# Patient Record
Sex: MALE | Race: WHITE | NOT HISPANIC OR LATINO | URBAN - METROPOLITAN AREA
[De-identification: names, ages, dates, MRNs, and addresses within clinical notes are randomized per-mention and may not be internally consistent; named-entity substitution may affect disease eponyms.]

---

## 2019-02-15 ENCOUNTER — EMERGENCY (EMERGENCY)
Facility: HOSPITAL | Age: 64
LOS: 1 days | Discharge: HOME | End: 2019-02-15
Attending: EMERGENCY MEDICINE
Payer: MEDICARE

## 2019-02-15 VITALS
OXYGEN SATURATION: 97 % | HEIGHT: 72 IN | DIASTOLIC BLOOD PRESSURE: 99 MMHG | RESPIRATION RATE: 18 BRPM | TEMPERATURE: 98 F | SYSTOLIC BLOOD PRESSURE: 156 MMHG | HEART RATE: 69 BPM | WEIGHT: 225.09 LBS

## 2019-02-15 PROCEDURE — 73030 X-RAY EXAM OF SHOULDER: CPT | Mod: 26,RT

## 2019-02-15 PROCEDURE — 99283 EMERGENCY DEPT VISIT LOW MDM: CPT

## 2019-02-15 PROCEDURE — 73030 X-RAY EXAM OF SHOULDER: CPT

## 2019-02-15 PROCEDURE — 99284 EMERGENCY DEPT VISIT MOD MDM: CPT

## 2019-02-15 RX ORDER — IBUPROFEN 200 MG
600 TABLET ORAL ONCE
Qty: 0 | Refills: 0 | Status: COMPLETED | OUTPATIENT
Start: 2019-02-15 | End: 2019-02-15

## 2019-02-15 RX ADMIN — Medication 600 MILLIGRAM(S): at 17:51

## 2019-02-15 NOTE — ED PROVIDER NOTE - PHYSICAL EXAMINATION
NAd, Hypertensive, Afebrile, No facial or scalp tender. No spinal tender. Lungs clear, ABD soft, non tender. + Right shoulder laterally tender with diminished ROMs. No obvious swelling. No spinal tenderness. Neuro- intact.

## 2019-02-15 NOTE — ED PROVIDER NOTE - CLINICAL SUMMARY MEDICAL DECISION MAKING FREE TEXT BOX
Pt with mechanical fall yesterday fell on right shoulder fell from 1 step no LOC no head injury no external bleeding Pain in right shoulder worse with active and passive movements No tingling or numbness over right deltoid area or distally No tenderness over right clavicale Normal exam of LUE and right elbow and wrist ambulatory xrays of right shoulder wnl likely contusion ice NSAIDs PMD follow up --Cosme

## 2019-02-15 NOTE — ED ADULT NURSE NOTE - NSIMPLEMENTINTERV_GEN_ALL_ED
Implemented All Fall Risk Interventions:  [place] to call system. Call bell, personal items and telephone within reach. Instruct patient to call for assistance. Room bathroom lighting operational. Non-slip footwear when patient is off stretcher. Physically safe environment: no spills, clutter or unnecessary equipment. Stretcher in lowest position, wheels locked, appropriate side rails in place. Provide visual cue, wrist band, yellow gown, etc. Monitor gait and stability. Monitor for mental status changes and reorient to person, place, and time. Review medications for side effects contributing to fall risk. Reinforce activity limits and safety measures with patient and family.

## 2019-02-15 NOTE — ED PROVIDER NOTE - OBJECTIVE STATEMENT
64yo male pt with PMHx of HTN, BPH, Pacemaker, ambulatory c/o right dominant shoulder pain s/p mechanical fall last night. Pt stated he tripped on a stair and fell on right shoulder. Pt also reported the pain's worsen with abduction movement. Denies head injury or other injuries. Denies LOC, headache or dizziness. Denies visual changes or N/V. Denies neck or back pain. Denies radiating pain, sensory changes or weakness to extremities. Denies CP/SOB/ABD pain or pelvic/ hip pain. Denies fever, chills or recent sickness.

## 2019-02-15 NOTE — ED ADULT NURSE NOTE - OBJECTIVE STATEMENT
63 y.o M A&Ox3 with PMH of HTN, BPH, valve repair (2005), thoracic aortic aneurysm surgery, presents to the ED c.o R shoulder pain s/p mechanical fall. Pt reports he missed one step at work and fell on R shoulder. Denies hitting head, and denies LOC. Pt. takes daily baby aspirin. Reports 9/10 pain during movement and 0 at rest. Pt. reports he was able to ambulate after fall without difficulty. Had pain when driving home and using RUE. Took Tylenol and applied ice last night. Partial relief last night. Had numbness/tingling last night which has since resolved. Pt. reports pain returned today and is unable to lift arm without difficulty due to pain. Strong peripheral pulses noted. Able to wiggle fingers without difficulty. Neuro intact. Denies fevers, chills, dizziness, CP, SOB, HA, and vision changes. Safety and comfort provided.

## 2019-02-15 NOTE — ED PROVIDER NOTE - ATTENDING CONTRIBUTION TO CARE
I have seen and evaluated this patient with the advance practice clinician.   I agree with the findings  unless other wise stated. I have amended notes where needed.  After my face to face bedside evaluation, I am noting: Pt with mechanical fall yesterday fell on right shoulder fell from 1 step no LOC no head injury no external bleeding Pain in right shoulder worse with active and passive movements No tingling or numbness over right deltoid area or distally No tenderness over right clavicale Normal exam of LUE and right elbow and wrist ambulatory xrays of right shoulder wnl likely contusion ice NSAIDs PMD follow up --Cosme

## 2019-10-15 ENCOUNTER — APPOINTMENT (OUTPATIENT)
Dept: SURGICAL ONCOLOGY | Facility: CLINIC | Age: 64
End: 2019-10-15
Payer: MEDICARE

## 2019-10-15 VITALS
HEART RATE: 66 BPM | BODY MASS INDEX: 30.48 KG/M2 | SYSTOLIC BLOOD PRESSURE: 118 MMHG | HEIGHT: 72 IN | WEIGHT: 225 LBS | DIASTOLIC BLOOD PRESSURE: 74 MMHG | OXYGEN SATURATION: 93 %

## 2019-10-15 DIAGNOSIS — Z86.79 PERSONAL HISTORY OF OTHER DISEASES OF THE CIRCULATORY SYSTEM: ICD-10-CM

## 2019-10-15 PROCEDURE — 99204 OFFICE O/P NEW MOD 45 MIN: CPT

## 2019-10-17 ENCOUNTER — OUTPATIENT (OUTPATIENT)
Dept: OUTPATIENT SERVICES | Facility: HOSPITAL | Age: 64
LOS: 1 days | End: 2019-10-17
Payer: MEDICARE

## 2019-10-17 ENCOUNTER — RESULT REVIEW (OUTPATIENT)
Age: 64
End: 2019-10-17

## 2019-10-17 DIAGNOSIS — C43.9 MALIGNANT MELANOMA OF SKIN, UNSPECIFIED: ICD-10-CM

## 2019-10-17 PROCEDURE — 88321 CONSLTJ&REPRT SLD PREP ELSWR: CPT

## 2019-10-21 PROBLEM — Z86.79 HISTORY OF CARDIAC MURMUR: Status: RESOLVED | Noted: 2019-10-15 | Resolved: 2019-10-21

## 2019-10-21 RX ORDER — CHROMIUM 200 MCG
TABLET ORAL
Refills: 0 | Status: ACTIVE | COMMUNITY

## 2019-10-21 RX ORDER — ASPIRIN 81 MG
81 TABLET, DELAYED RELEASE (ENTERIC COATED) ORAL
Refills: 0 | Status: ACTIVE | COMMUNITY

## 2019-10-21 RX ORDER — ASCORBIC ACID 500 MG
TABLET ORAL
Refills: 0 | Status: ACTIVE | COMMUNITY

## 2019-10-21 RX ORDER — TAMSULOSIN HYDROCHLORIDE 0.4 MG/1
0.4 CAPSULE ORAL
Refills: 0 | Status: ACTIVE | COMMUNITY

## 2019-10-21 RX ORDER — METOPROLOL SUCCINATE 50 MG/1
50 TABLET, EXTENDED RELEASE ORAL
Refills: 0 | Status: ACTIVE | COMMUNITY

## 2019-10-21 NOTE — ASSESSMENT
[FreeTextEntry1] : 65 y/o male with newly diagnosed left mid-back melanoma.\par Extent of disease is unclear based on supplied pathology report in Polish.\par \par Plan: Will submit slides for review.  Pending review, will determine whether additional excision and sentinel lymph node biopsy are indicated.  Patient to return in two week to discuss pathology.  All questions answered.

## 2019-10-21 NOTE — HISTORY OF PRESENT ILLNESS
[de-identified] : Mr. Martin is a 65 y/o male who was noted to have a suspicious appearing cutaneous lesion in the left mid-back while travelling in [place].  He underwent surgical excision in [place] on 09/26/2019 for which pathology demonstrated a melanoma.\par He is referred for possible additional treatment.\par This is his first cutaneous malignancy.\par He denies associated pain, bleeding or nodularity.\par He offers no other complaints.\par Per pathology report, melanoma reported to be Too's level 2, Breslow depth of 2 mm, pT1a which is inconsistent.

## 2019-10-21 NOTE — PHYSICAL EXAM
[FreeTextEntry1] : Back: Well healed left mid-back incision without residual pigmentation or surrounding nodularity.  No palpable regional adenopathy in axilla or cervical/supraclavicular neck. [Normal] : supple, no neck mass and thyroid not enlarged [Normal Supraclavicular Lymph Nodes] : normal supraclavicular lymph nodes [Normal Neck Lymph Nodes] : normal neck lymph nodes  [Normal Groin Lymph Nodes] : normal groin lymph nodes [Normal Axillary Lymph Nodes] : normal axillary lymph nodes [Normal] : grossly intact

## 2019-10-24 ENCOUNTER — OUTPATIENT (OUTPATIENT)
Dept: OUTPATIENT SERVICES | Facility: HOSPITAL | Age: 64
LOS: 1 days | End: 2019-10-24
Payer: MEDICARE

## 2019-10-24 ENCOUNTER — APPOINTMENT (OUTPATIENT)
Dept: SURGICAL ONCOLOGY | Facility: CLINIC | Age: 64
End: 2019-10-24
Payer: MEDICARE

## 2019-10-24 VITALS
TEMPERATURE: 97 F | OXYGEN SATURATION: 97 % | DIASTOLIC BLOOD PRESSURE: 78 MMHG | WEIGHT: 225.97 LBS | HEART RATE: 68 BPM | SYSTOLIC BLOOD PRESSURE: 108 MMHG | HEIGHT: 70.25 IN | RESPIRATION RATE: 14 BRPM

## 2019-10-24 VITALS
BODY MASS INDEX: 30.48 KG/M2 | HEIGHT: 72 IN | HEART RATE: 63 BPM | WEIGHT: 225 LBS | SYSTOLIC BLOOD PRESSURE: 143 MMHG | DIASTOLIC BLOOD PRESSURE: 78 MMHG | RESPIRATION RATE: 15 BRPM

## 2019-10-24 DIAGNOSIS — Z95.2 PRESENCE OF PROSTHETIC HEART VALVE: Chronic | ICD-10-CM

## 2019-10-24 DIAGNOSIS — C43.59 MALIGNANT MELANOMA OF OTHER PART OF TRUNK: ICD-10-CM

## 2019-10-24 DIAGNOSIS — Z95.0 PRESENCE OF CARDIAC PACEMAKER: Chronic | ICD-10-CM

## 2019-10-24 DIAGNOSIS — Z86.79 PERSONAL HISTORY OF OTHER DISEASES OF THE CIRCULATORY SYSTEM: Chronic | ICD-10-CM

## 2019-10-24 DIAGNOSIS — Z98.890 OTHER SPECIFIED POSTPROCEDURAL STATES: Chronic | ICD-10-CM

## 2019-10-24 LAB
ANION GAP SERPL CALC-SCNC: 13 MMO/L — SIGNIFICANT CHANGE UP (ref 7–14)
BUN SERPL-MCNC: 15 MG/DL — SIGNIFICANT CHANGE UP (ref 7–23)
CALCIUM SERPL-MCNC: 9.6 MG/DL — SIGNIFICANT CHANGE UP (ref 8.4–10.5)
CHLORIDE SERPL-SCNC: 103 MMOL/L — SIGNIFICANT CHANGE UP (ref 98–107)
CO2 SERPL-SCNC: 27 MMOL/L — SIGNIFICANT CHANGE UP (ref 22–31)
CREAT SERPL-MCNC: 1.01 MG/DL — SIGNIFICANT CHANGE UP (ref 0.5–1.3)
GLUCOSE SERPL-MCNC: 87 MG/DL — SIGNIFICANT CHANGE UP (ref 70–99)
HCT VFR BLD CALC: 43.9 % — SIGNIFICANT CHANGE UP (ref 39–50)
HGB BLD-MCNC: 14.8 G/DL — SIGNIFICANT CHANGE UP (ref 13–17)
MCHC RBC-ENTMCNC: 30.7 PG — SIGNIFICANT CHANGE UP (ref 27–34)
MCHC RBC-ENTMCNC: 33.7 % — SIGNIFICANT CHANGE UP (ref 32–36)
MCV RBC AUTO: 91.1 FL — SIGNIFICANT CHANGE UP (ref 80–100)
NRBC # FLD: 0 K/UL — SIGNIFICANT CHANGE UP (ref 0–0)
PLATELET # BLD AUTO: 197 K/UL — SIGNIFICANT CHANGE UP (ref 150–400)
PMV BLD: 9.4 FL — SIGNIFICANT CHANGE UP (ref 7–13)
POTASSIUM SERPL-MCNC: 4 MMOL/L — SIGNIFICANT CHANGE UP (ref 3.5–5.3)
POTASSIUM SERPL-SCNC: 4 MMOL/L — SIGNIFICANT CHANGE UP (ref 3.5–5.3)
RBC # BLD: 4.82 M/UL — SIGNIFICANT CHANGE UP (ref 4.2–5.8)
RBC # FLD: 13.3 % — SIGNIFICANT CHANGE UP (ref 10.3–14.5)
SODIUM SERPL-SCNC: 143 MMOL/L — SIGNIFICANT CHANGE UP (ref 135–145)
WBC # BLD: 6.63 K/UL — SIGNIFICANT CHANGE UP (ref 3.8–10.5)
WBC # FLD AUTO: 6.63 K/UL — SIGNIFICANT CHANGE UP (ref 3.8–10.5)

## 2019-10-24 PROCEDURE — 99214 OFFICE O/P EST MOD 30 MIN: CPT

## 2019-10-24 PROCEDURE — 93010 ELECTROCARDIOGRAM REPORT: CPT

## 2019-10-24 RX ORDER — SODIUM CHLORIDE 9 MG/ML
3 INJECTION INTRAMUSCULAR; INTRAVENOUS; SUBCUTANEOUS EVERY 8 HOURS
Refills: 0 | Status: DISCONTINUED | OUTPATIENT
Start: 2019-11-01 | End: 2019-11-16

## 2019-10-24 RX ORDER — VITAMIN E 100 UNIT
1 CAPSULE ORAL
Qty: 0 | Refills: 0 | DISCHARGE

## 2019-10-24 RX ORDER — SODIUM CHLORIDE 9 MG/ML
1000 INJECTION, SOLUTION INTRAVENOUS
Refills: 0 | Status: DISCONTINUED | OUTPATIENT
Start: 2019-11-01 | End: 2019-11-16

## 2019-10-24 NOTE — H&P PST ADULT - NEGATIVE GENERAL GENITOURINARY SYMPTOMS
no hematuria/no bladder infections/no renal colic no flank pain L/no flank pain R/no bladder infections/no hematuria/no renal colic

## 2019-10-24 NOTE — H&P PST ADULT - ASSESSMENT
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    Patient provided with famotidine for GI prophylaxis; verbalized understanding.    Patient provided with Chlorhexidine wash, verbal and written instructions reviewed. Patient demonstrated understanding with teach back.     STOP BANG score met, OR booking notified  OR booking notified of pacemaker and valve replacement     Comparison EKG, Echo and Stress test  requested    Cardiac evaluation requested by PST for abnormal EKG and aspirin plan, patient verbalized understanding, will make appointment  Case discussed with Dr Davis    Problem: Hypertension  Assessment and Plan: Patient instructed to take  on day of procedure, verbalized understanding.  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    Patient provided with famotidine for GI prophylaxis; verbalized understanding.    Patient provided with Chlorhexidine wash, verbal and written instructions reviewed. Patient demonstrated understanding with teach back.     STOP BANG score met, OR booking notified  OR booking notified of pacemaker and valve replacement     Comparison EKG, Echo and Stress test  requested    Cardiac evaluation requested by PST for  aspirin plan, patient verbalized understanding, will make appointment  Case discussed with Dr Canseco    Problem: Hypertension  Assessment and Plan: Patient instructed to take  on day of procedure, verbalized understanding.  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    Patient provided with famotidine for GI prophylaxis; verbalized understanding.    Patient provided with Chlorhexidine wash, verbal and written instructions reviewed. Patient demonstrated understanding with teach back.     STOP BANG score met, OR booking notified  OR booking notified of pacemaker and valve replacement     Comparison EKG, Echo and Stress test  requested    Cardiac evaluation requested by PST for aspirin plan, patient verbalized understanding, will make appointment  Case discussed with Dr Canseco    Problem: Hypertension  Assessment and Plan: Patient instructed to take metoprolol on day of procedure, verbalized understanding.  Patient instructed to stop multivitamin today , verbalized understanding

## 2019-10-24 NOTE — H&P PST ADULT - HISTORY OF PRESENT ILLNESS
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 Patient states he had melanoma removed in [place] 3 weeks prior and had reevaluation by surgeon. 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 Patient states he had melanoma removed in [place] 3 weeks prior and was referred to surgeon in [place] for further evaluation. 64 year old male presents with preop diagnosis of malignant melanoma of other part of trunk scheduled for excision left back melanoma axillary sentinel lymph node biopsy on 11/01/19. Patient states he had melanoma removed in [place] 3 weeks prior and was referred to surgeon in [place] for further evaluation.

## 2019-10-24 NOTE — H&P PST ADULT - NSICDXPASTMEDICALHX_GEN_ALL_CORE_FT
PAST MEDICAL HISTORY:  Cardiac murmur     History of BPH     Hypertension PAST MEDICAL HISTORY:  Cardiac murmur     History of BPH     Hypertension     Malignant melanoma left back

## 2019-10-24 NOTE — H&P PST ADULT - NEGATIVE CARDIOVASCULAR SYMPTOMS
no palpitations/no claudication/no dyspnea on exertion/no chest pain/no peripheral edema/no paroxysmal nocturnal dyspnea/no orthopnea

## 2019-10-24 NOTE — H&P PST ADULT - NEGATIVE SKIN SYMPTOMS
no change in size/color of mole/no tumor/no rash/no itching/no dryness no rash/no change in size/color of mole/no tumor/no itching

## 2019-10-24 NOTE — H&P PST ADULT - NSANTHTOTALSCORECAL_ENT_A_CORE
11/3/2017      RE: Brooks Summers  [address]  [facility] [address]       Dear Colleague,    Thank you for the opportunity to participate in the care of your patient, Brooks Summers, at the [facility]. Please see a copy of my visit note below.    HPI Mr. 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 He previously had severe left ventricular systolic dysfunction with previous EF 10-15% s/p ICD placement (1/2012) now with improved EF (50-55%). He subsequently improved his functional status and had been walking 3-4 miles a day, golfing 18 holes of golf without sx and carrying his golf clubs.     Today, the patient denies chest pain, orthopnea, PND, LE edema, early satiety, fatigue, racing heart beat, lightheadedness. No nausea or diaphoresis. No f/c/s.    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 Tx with Dapto/RIFx 8 weeks.     Gout flare      ICD (implantable cardiac defibrillator) in place      Keratoconus 1/29/14    RE>>LE     Paroxysmal a-fib (H)     Post-op 7/14/2011, 1/26/13     Rotator Cuff (Capsule) Sprain and Strain      S/P aortic valve replacement     7/14/2011, re-do 1/25/13 due to endocarditis     Smoking hx      Thrombocytopenia (H)        FAMILY HISTORY:  Family History   Problem Relation Age of Onset     C.A.D. Father 68     bypass, carotid      Prostate Cancer Father 70     CANCER Mother 92     stomach, colon     Hypertension Mother      Retinal detachment Mother       CANCER Brother 64     lung cancer, petroleum     Glaucoma No family hx of      Macular Degeneration No family hx of      Strabismus No family hx of      Glasses (<7 y/o) No family hx of        SOCIAL HISTORY:  History     Social History     Marital Status:      Spouse Name: N/A     Number of Children: N/A     Years of Education: N/A     Social History Main Topics     Smoking status: Former Smoker -- 1.00 packs/day for 20 years     Types: Cigarettes     Quit date: 12/31/1989     Smokeless tobacco: Never Used     Alcohol Use: Yes     Drug Use: No     Sexual Activity:     Partners: Female      Comment:      Other Topics Concern     None     Social History Narrative     since 2/1982 2 children 4 grandchildren.Carpet sales. Athlete in school, Golf, fish, yardwork       CURRENT MEDICATIONS:  Current Outpatient Prescriptions   Medication Sig Dispense Refill     Multiple Vitamins-Minerals (MULTIVITAMIN ADULTS 50+) TABS        VITAMIN D, CHOLECALCIFEROL, PO Take 1,000 Units by mouth daily       warfarin (COUMADIN) 5 MG tablet Take 7.5 mg on Mon, Fri; 10 mg all other days or as directed by the Anticoagulation Clinic 105 tablet 1     allopurinol (ZYLOPRIM) 100 MG tablet Take 1 tablet (100 mg) by mouth every evening 90 tablet 3     losartan (COZAAR) 25 MG tablet Take 0.5 tablets (12.5 mg) by mouth every evening 45 tablet 2     simvastatin (ZOCOR) 40 MG tablet Take 1 tablet (40 mg) by mouth At Bedtime 90 tablet 2     metoprolol (TOPROL XL) 50 MG 24 hr tablet Take 1 tablet (50 mg) by mouth daily 90 tablet 3     PROVIDER DOSED MANAGED WARFARIN, COUMADIN, OUTPATIENT Per coumadin clinic         ROS:   Constitutional: No fever, chills, or sweats. No weight gain/loss.   ENT: No visual disturbance, ear ache, epistaxis, sore throat.   Allergies/Immunologic: Negative.   Respiratory: No cough, hemoptysis.   Cardiovascular: As per HPI.   GI: No nausea, vomiting, hematemesis, melena, or hematochezia.   : No urinary  "frequency, dysuria, or hematuria.   Integument: Negative.   Psychiatric: Negative.   Neuro: Negative.   Endocrinology: Negative.   Musculoskeletal: Negative.    EXAM:  /70 (BP Location: Left arm, Cuff Size: Adult Regular)  Pulse 70  Ht 1.778 m (5' 10\")  Wt 83.9 kg (185 lb)  SpO2 95%  BMI 26.54 kg/m2  General: appears comfortable, alert and articulate  Head: normocephalic, atraumatic  Eyes: anicteric sclera, EOMI  Neck: no adenopathy  Orophyarynx: moist mucosa, no lesions, dentition intact  Heart: regular, S1/S2; 2-3/6 systolic ejection murmur at RUSB; no gallop, rub, estimated JVP 7  Lungs: clear, no rales or wheezing  Abdomen: soft, non-tender, bowel sounds present, no hepatosplenomegaly  Extremities: no clubbing, cyanosis or edema  Neurological: normal speech and affect, no gross motor deficits    Labs:  CBC RESULTS:  Lab Results   Component Value Date    WBC 6.4 11/28/2016    RBC 4.78 11/28/2016    HGB 13.6 04/03/2017    HCT 44.0 11/28/2016    MCV 92 11/28/2016    MCH 31.6 11/28/2016    MCHC 34.3 11/28/2016    RDW 13.2 11/28/2016    PLT 96 (L) 03/17/2017     CMP RESULTS:  Lab Results   Component Value Date     11/28/2016    POTASSIUM 4.0 04/03/2017    CHLORIDE 106 11/28/2016    CO2 27 11/28/2016    ANIONGAP 7 11/28/2016    GLC 92 11/28/2016    BUN 17 11/28/2016    CR 0.88 04/03/2017    GFRESTIMATED 85 04/03/2017    GFRESTBLACK >90   GFR Calc   04/03/2017    MARTIN 9.2 11/28/2016    BILITOTAL 1.0 11/28/2016    ALBUMIN 4.2 11/28/2016    ALKPHOS 68 11/28/2016    ALT 45 11/28/2016    AST 34 11/28/2016      INR RESULTS:  Lab Results   Component Value Date    INR 2.0 (A) 10/11/2017    INR 1.17 (H) 04/03/2017     Lab Results   Component Value Date    MAG 2.1 08/20/2014     No results found for: NTBNPI  Lab Results   Component Value Date    NTBNP 552 (H) 12/17/2012         Echocardiogram 10/12/2015  Interpretation Summary  AVR with 23 mm Perimount Aortic valve 2011. Leaflet mobility is " normal and there is no calcification seen. There is mild central AI and normal transvalvar velocities (peak velocity 2.4 m/s, DVI .4, EOA 2.5 cm2)  LV size and function are normal with an LVEF at lower limits of normal (53%). There is no LVH.  Mean Aortic valve gradient 13 mm Hg.    Device interrogation 10/12/2015  Patient seen in clinic for evaluation and iterative programming of his multi lead ICD and appointment with Dr. Castro. Normal ICD function. No ventricular arrhythmias recorded. 3 AT/AF episodes recorded - total AT/AF time = 15 seconds, no stored EGM's for review. Intrinsic rhythm = NSR with CHB. BVP = 99.5%. OptiVol fluid index is near baseline. Battery voltage = 2.64 V. SIMONE = 2.63 V.   Multi lead ICD    12/03/2015 - CRT-D generator change   FINAL PROGRAMMING  Francis Settings:  -Pacing mode: DDDR.  -Lower Rate Limit: 60 ppm.  -Upper Rate Limit: 140 ppm.  -LV Lead pacing configuration: LV tip-RV coil.  Tachy Settings:  -VT Zone: set at 188 bpm, Therapy: 35 J x 6.  -VF zone: set at 143 bpm, Therapy: monitor only.    Device interrogation 10/07/2016  Normal device parameters. Normal ICD function. No ventricular arrhythmias recorded. OptiVol fluid index is at baseline.    ASSESSMENT   In summary, Mr. Summers is a 69-year-old male, s/p re-do median sternotomy with an aortic valve replacement with a 21-mm Johnson PERIMOUNT Magna tissue valve plus mitral valve repair with pericardial patch repair of a large anterior mitral valve leaflet perforation and debridement of the infected aortic root with subannular infection due to endocarditis.  Also has history of complete heart block, low EF 10-15%, now recovered to 50-55% in the setting of biventricular-ICD.    Problem List  -Bioprosthetic AVR redo secondary to endocarditis (7/14/2011,1/25/13); echo 12/15/2015 notable for Ao mean gradient 13 mm Hg  -Complete heart block s/p CRT-D (s/p generator change 12/3/2015)  -Cardiomyopathy recovered EF 50-55% s/p  CRT-D  -Paroxysmal AF on anticoagulation  -Hyperlipidemia  -Thrombocytopenia (seen by hematology; negative workup)    Plan  -will continue current heart failure regimen (losartan 12.5, and metoprolol 50 xl), along with cardiac resynchronization therapy  -absolutely requires endocarditis prophylaxis for procedures (amoxicillin 2 g PO 1 hour prior to procedures)  -continue warfarin for [id number] of 2, very low burden on device interrogation  -RTC in 1 year with echocardiogram at that time    Teto Merino MD  Cardiovascular Disease Fellow  Pager: [phone]    Attending Attestation:  Patient seen and examined by me with the Fellow. I have performed all pertinent elements of the physical examination and reviewed the note above. I have reviewed pertinent laboratory, echocardiographic, imaging, and cardiac catheterization results. I agree with the plan of care as described in this note.    Yemi Castro MD, PhD       5

## 2019-10-24 NOTE — H&P PST ADULT - CARDIOVASCULAR DETAILS
positive S2/positive S1/murmur irregular rate and rhythm/positive S1/positive S2 positive S1/positive S2

## 2019-10-24 NOTE — H&P PST ADULT - NEGATIVE ENMT SYMPTOMS
no nasal obstruction/no hearing difficulty/no tinnitus/no sinus symptoms/no nasal discharge/no dry mouth/no throat pain/no vertigo/no ear pain/no dysphagia

## 2019-10-24 NOTE — H&P PST ADULT - CARDIOVASCULAR COMMENTS
PMH pacemaker insertion, cardiac murmur s/p valve replacement Abnormal EKG- pt denies CP, SOB, palpitations- scheduled for cardiac evaluation PMH pacemaker, cardiac murmur s/p valve replacement

## 2019-10-24 NOTE — H&P PST ADULT - NSICDXPASTSURGICALHX_GEN_ALL_CORE_FT
PAST SURGICAL HISTORY:  Cardiac pacemaker 05/2013    History of heart valve replacement 2005, 2013, porcine PAST SURGICAL HISTORY:  Cardiac pacemaker 05/2013    History of heart valve replacement 2005, 2013, porcine    S/P abdominal aortic aneurysm repair 2013

## 2019-10-24 NOTE — H&P PST ADULT - RS GEN PE MLT RESP DETAILS PC
clear to auscultation bilaterally/good air movement/breath sounds equal/no rales/respirations non-labored/airway patent/no wheezes/no rhonchi

## 2019-10-25 NOTE — ASSESSMENT
[FreeTextEntry1] : T2a melanoma of left mid/outer back - plan for wide excision and sentinel lymph node biopsy.\par T1a melanoma of right upper back - plan wide excision without indication for sentinel lymph node biopsy.\par Plastic surgery evaluation for closure of defect.\par Risks/benefits explained to patient.  All questions answered.  He expresses understanding and wishes to proceed.

## 2019-10-25 NOTE — PHYSICAL EXAM
[FreeTextEntry1] : Back: Well healed left mid-back incision without residual pigmentation or surrounding nodularity.  No palpable regional adenopathy in axilla or cervical/supraclavicular neck.  Biopsy site of right upper back melanoma. [Normal] : supple, no neck mass and thyroid not enlarged [Normal Supraclavicular Lymph Nodes] : normal supraclavicular lymph nodes [Normal Neck Lymph Nodes] : normal neck lymph nodes  [Normal Groin Lymph Nodes] : normal groin lymph nodes [Normal Axillary Lymph Nodes] : normal axillary lymph nodes [Normal] : oriented to person, place and time, with appropriate affect

## 2019-10-25 NOTE — HISTORY OF PRESENT ILLNESS
[de-identified] : Mr. Martin is a 63 y/o male who was noted to have a suspicious appearing cutaneous lesion in the left mid-back while travelling in [place].  He underwent surgical excision in [place] on 09/26/2019 for which pathology demonstrated a melanoma.\par He is referred for possible additional treatment.\par This is his first cutaneous malignancy.\par He denies associated pain, bleeding or nodularity.\par He offers no other complaints.\par Per pathology report, melanoma reported to be Too's level 2, Breslow depth of 2 mm, pT1a which is inconsistent.\par \par 10/24/2019: Pathologic review of slides from left mid/outer back melanoma excision here at [facility] demonstrates a 1.2 mm thick melanoma with regression.  Since his initial consultation, patient has another biopsy a right upper back lesion which returned as a 0.5 mm thick melanoma.  He feels well and offers no complaints.  Surgery scheduled for 11/01/2019.

## 2019-10-28 ENCOUNTER — APPOINTMENT (OUTPATIENT)
Dept: PLASTIC SURGERY | Facility: CLINIC | Age: 64
End: 2019-10-28
Payer: MEDICARE

## 2019-10-28 VITALS — BODY MASS INDEX: 30.48 KG/M2 | WEIGHT: 225 LBS | HEIGHT: 72 IN

## 2019-10-28 DIAGNOSIS — Z78.9 OTHER SPECIFIED HEALTH STATUS: ICD-10-CM

## 2019-10-28 PROBLEM — C43.9 MALIGNANT MELANOMA OF SKIN, UNSPECIFIED: Chronic | Status: ACTIVE | Noted: 2019-10-24

## 2019-10-28 PROBLEM — Z87.438 PERSONAL HISTORY OF OTHER DISEASES OF MALE GENITAL ORGANS: Chronic | Status: ACTIVE | Noted: 2019-10-24

## 2019-10-28 PROBLEM — R01.1 CARDIAC MURMUR, UNSPECIFIED: Chronic | Status: ACTIVE | Noted: 2019-10-24

## 2019-10-28 PROBLEM — I10 ESSENTIAL (PRIMARY) HYPERTENSION: Chronic | Status: ACTIVE | Noted: 2019-10-24

## 2019-10-28 PROCEDURE — 99204 OFFICE O/P NEW MOD 45 MIN: CPT

## 2019-10-29 PROBLEM — Z78.9 MODERATE EXERCISE: Status: ACTIVE | Noted: 2019-10-28

## 2019-10-29 PROBLEM — Z78.9 DOES NOT USE ILLICIT DRUGS: Status: ACTIVE | Noted: 2019-10-28

## 2019-10-29 PROBLEM — Z78.9 SOCIAL ALCOHOL USE: Status: ACTIVE | Noted: 2019-10-28

## 2019-10-29 NOTE — HISTORY OF PRESENT ILLNESS
[FreeTextEntry1] : Patient with two skin lesions of the back which were biopsied in [place]. Review of the slides here revealed one lesion as 1.2 mm superficial spreading MM and the other and inflamed lentigo. Patient is scheduled for wide and deep excision by Dr. Beckett and a sentinel lymph node biopsy. The patient is here for consultation for reconstruction after excision.

## 2019-10-29 NOTE — REASON FOR VISIT
[Consultation] : a consultation visit [FreeTextEntry1] : Pt presents here for an initial consultation at the request of Dr. Connor Beckett regarding surgery scheduled on 11/01/2019 at [facility]. Pt states he has a lesion present on his left mid/outer back and right upper back. Pt states he had a biopsy performed on 09/26/2019 in [place], results indicate melanoma. Pt states he had two biopsies performed at [facility] on 10/24/2019, left mid/outer back lesion demonstrates a 1.2mm thick melanoma with regression and right upper back lesion demonstrates a 0.5mm thick melanoma. Pt states Dr. Beckett would like assistance of MD Plastic Surgeon.

## 2019-10-31 ENCOUNTER — OUTPATIENT (OUTPATIENT)
Dept: OUTPATIENT SERVICES | Facility: HOSPITAL | Age: 64
LOS: 1 days | End: 2019-10-31
Payer: COMMERCIAL

## 2019-10-31 ENCOUNTER — FORM ENCOUNTER (OUTPATIENT)
Age: 64
End: 2019-10-31

## 2019-10-31 ENCOUNTER — RESULT REVIEW (OUTPATIENT)
Age: 64
End: 2019-10-31

## 2019-10-31 DIAGNOSIS — Z95.2 PRESENCE OF PROSTHETIC HEART VALVE: Chronic | ICD-10-CM

## 2019-10-31 DIAGNOSIS — C43.59 MALIGNANT MELANOMA OF OTHER PART OF TRUNK: ICD-10-CM

## 2019-10-31 DIAGNOSIS — Z95.0 PRESENCE OF CARDIAC PACEMAKER: Chronic | ICD-10-CM

## 2019-10-31 DIAGNOSIS — Z98.890 OTHER SPECIFIED POSTPROCEDURAL STATES: Chronic | ICD-10-CM

## 2019-10-31 PROCEDURE — 88321 CONSLTJ&REPRT SLD PREP ELSWR: CPT

## 2019-10-31 NOTE — ASU PATIENT PROFILE, ADULT - PSH
Cardiac pacemaker  05/2013  History of heart valve replacement  2005, 2013, porcine  S/P abdominal aortic aneurysm repair  2013

## 2019-11-01 ENCOUNTER — APPOINTMENT (OUTPATIENT)
Dept: NUCLEAR MEDICINE | Facility: HOSPITAL | Age: 64
End: 2019-11-01

## 2019-11-01 ENCOUNTER — APPOINTMENT (OUTPATIENT)
Dept: SURGICAL ONCOLOGY | Facility: HOSPITAL | Age: 64
End: 2019-11-01

## 2019-11-01 ENCOUNTER — OUTPATIENT (OUTPATIENT)
Dept: OUTPATIENT SERVICES | Facility: HOSPITAL | Age: 64
LOS: 1 days | Discharge: HOME | End: 2019-11-01
Payer: MEDICARE

## 2019-11-01 ENCOUNTER — RESULT REVIEW (OUTPATIENT)
Age: 64
End: 2019-11-01

## 2019-11-01 VITALS
SYSTOLIC BLOOD PRESSURE: 104 MMHG | RESPIRATION RATE: 16 BRPM | DIASTOLIC BLOOD PRESSURE: 58 MMHG | TEMPERATURE: 97 F | HEIGHT: 72 IN | WEIGHT: 225.09 LBS | OXYGEN SATURATION: 94 % | HEART RATE: 61 BPM

## 2019-11-01 VITALS
RESPIRATION RATE: 18 BRPM | TEMPERATURE: 98 F | SYSTOLIC BLOOD PRESSURE: 137 MMHG | DIASTOLIC BLOOD PRESSURE: 62 MMHG | OXYGEN SATURATION: 95 % | HEART RATE: 63 BPM

## 2019-11-01 DIAGNOSIS — Z95.2 PRESENCE OF PROSTHETIC HEART VALVE: Chronic | ICD-10-CM

## 2019-11-01 DIAGNOSIS — Z95.0 PRESENCE OF CARDIAC PACEMAKER: Chronic | ICD-10-CM

## 2019-11-01 DIAGNOSIS — C43.59 MALIGNANT MELANOMA OF OTHER PART OF TRUNK: ICD-10-CM

## 2019-11-01 DIAGNOSIS — Z98.890 OTHER SPECIFIED POSTPROCEDURAL STATES: Chronic | ICD-10-CM

## 2019-11-01 PROCEDURE — 12032 INTMD RPR S/A/T/EXT 2.6-7.5: CPT | Mod: XS

## 2019-11-01 PROCEDURE — 38525 BIOPSY/REMOVAL LYMPH NODES: CPT

## 2019-11-01 PROCEDURE — 11606 EXC TR-EXT MAL+MARG >4 CM: CPT

## 2019-11-01 PROCEDURE — 88305 TISSUE EXAM BY PATHOLOGIST: CPT | Mod: 26

## 2019-11-01 PROCEDURE — 88307 TISSUE EXAM BY PATHOLOGIST: CPT | Mod: 26

## 2019-11-01 PROCEDURE — 14021 TIS TRNFR S/A/L 10.1-30 SQCM: CPT | Mod: 59

## 2019-11-01 PROCEDURE — 14301 TIS TRNFR ANY 30.1-60 SQ CM: CPT

## 2019-11-01 PROCEDURE — 88342 IMHCHEM/IMCYTCHM 1ST ANTB: CPT | Mod: 26

## 2019-11-01 PROCEDURE — 78195 LYMPH SYSTEM IMAGING: CPT | Mod: 26

## 2019-11-01 PROCEDURE — 88341 IMHCHEM/IMCYTCHM EA ADD ANTB: CPT | Mod: 26

## 2019-11-01 PROCEDURE — 11604 EXC TR-EXT MAL+MARG 3.1-4 CM: CPT

## 2019-11-01 RX ORDER — ACETAMINOPHEN 500 MG
2 TABLET ORAL
Qty: 32 | Refills: 0
Start: 2019-11-01 | End: 2019-11-04

## 2019-11-01 RX ORDER — TAMSULOSIN HYDROCHLORIDE 0.4 MG/1
1 CAPSULE ORAL
Qty: 0 | Refills: 0 | DISCHARGE

## 2019-11-01 RX ORDER — OXYCODONE HYDROCHLORIDE 5 MG/1
1 TABLET ORAL
Qty: 12 | Refills: 0
Start: 2019-11-01 | End: 2019-11-03

## 2019-11-01 RX ORDER — SODIUM CHLORIDE 9 MG/ML
500 INJECTION, SOLUTION INTRAVENOUS
Refills: 0 | Status: DISCONTINUED | OUTPATIENT
Start: 2019-11-01 | End: 2019-11-16

## 2019-11-01 RX ORDER — ASPIRIN/CALCIUM CARB/MAGNESIUM 324 MG
1 TABLET ORAL
Qty: 0 | Refills: 0 | DISCHARGE

## 2019-11-01 RX ORDER — METOPROLOL TARTRATE 50 MG
1 TABLET ORAL
Qty: 0 | Refills: 0 | DISCHARGE

## 2019-11-01 RX ADMIN — SODIUM CHLORIDE 30 MILLILITER(S): 9 INJECTION, SOLUTION INTRAVENOUS at 12:32

## 2019-11-01 NOTE — ASU DISCHARGE PLAN (ADULT/PEDIATRIC) - PROVIDER TOKENS
PROVIDER:[TOKEN:[[id number]],FOLLOWUP:[2 weeks]],PROVIDER:[TOKEN:[[id number]],FOLLOWUP:[1 week]]

## 2019-11-01 NOTE — ASU DISCHARGE PLAN (ADULT/PEDIATRIC) - POST OP PHONE #
[phone] pt. granted permission to leave message /and or speak with whoever answers the phone.

## 2019-11-01 NOTE — ASU PREOP CHECKLIST - STERILIZATION AFFIRMATION
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 without significant side  ffects except some hiccoughs  in first 4 weeks of treatment and mid range dose of about 10 mg/kg/day of ethosuximide  Ready to increase ESX to 200 mg bid for one week then 250 mg bid  Mom will call     Fatigue is noted at school and may be alleviated by decrease in her alpha blockers being used for ADHD  Discussed with mom and suggested she discuss this with psychiatry  Subjective:    HPI     Doing ok on meds with definite decrease in staring spells but still see them every day   Had been several times an hour   School has not noticed them in the first weeks this year    She is crying and sleepy at school though    Her adhd meds are tenex, clonidine and vyvanse      The following portions of the patient's history were reviewed and updated as appropriate: allergies, current medications, past family history, past medical history, past social history, past surgical history and problem list          Objective:  BP (!) 103/56 (BP Location: Right arm, Patient Position: Sitting, Cuff Size: Standard)   Pulse 90   Resp 20   Ht 3' 6 4" (1 077 m)   Wt 18 6 kg (41 lb)   BMI 16 03 kg/m²     There were no vitals taken for this visit  Physical Exam   Skin clear  Face and mucous membranes normal  Neck supple - thyromegaly  Nl heart rate  Normal resp effort  Abd soft - no HSM  Extremities nrmal without petechiae   NEURO:  Normal, flat optic discs  EOM, face, tongue, and palate movement normal  Normal finger to nose, no tremor or dysmetria  Normal unipedal stand, hop, tandem, toe and heel standing  Normal posture sitting, sit to stand and standing  Normal functional strength    DTRs normal   Psychiatric: normal engagement; minor excess movement followed direction      I have reviewed the ROS as written below       ROS:    Review of Systems Constitutional: Negative for appetite change, fatigue and unexpected weight change  HENT: Negative for congestion, tinnitus, trouble swallowing and voice change  Eyes: Negative for visual disturbance  Respiratory: Negative for shortness of breath  Cardiovascular: Negative for palpitations  Gastrointestinal: Negative for abdominal pain, diarrhea, nausea and vomiting  Genitourinary: Negative for frequency and urgency  Musculoskeletal: Negative for arthralgias, back pain, myalgias and neck pain  Skin: Negative for rash  Neurological: Negative for dizziness, tremors, seizures, syncope, facial asymmetry, speech difficulty, weakness, light-headedness, numbness and headaches  Hematological: Bruises/bleeds easily  Psychiatric/Behavioral: Positive for sleep disturbance  n/a

## 2019-11-01 NOTE — ASU DISCHARGE PLAN (ADULT/PEDIATRIC) - CARE PROVIDER_API CALL
Connor Beckett)  Surgery  [address] [place] [address]  Phone: [phone]  Fax: [phone]  Follow Up Time: 2 weeks    Waldo Wilkins)  Plastic Surgery  [address] [place] [address], [address]  [place] [address]  Phone: [phone]  Fax: [phone]  Follow Up Time: 1 week

## 2019-11-01 NOTE — ASU DISCHARGE PLAN (ADULT/PEDIATRIC) - ASU DC SPECIAL INSTRUCTIONSFT
Keep dressings on.  You may shower in 24 hours.  Do not rub or scrub over dressings.  Pat dry gently.

## 2019-11-01 NOTE — BRIEF OPERATIVE NOTE - NSICDXBRIEFPROCEDURE_GEN_ALL_CORE_FT
PROCEDURES:  Adjacent tissue transfer of trunk, 10.1 to 30 sq cm 01-Nov-2019 19:13:49  River Turner
PROCEDURES:  Wide excision, melanoma, torso, with sentinel lymph node biopsy 01-Nov-2019 18:21:03 Left and right back Robbie Bates

## 2019-11-01 NOTE — BRIEF OPERATIVE NOTE - OPERATION/FINDINGS
closure L axilla, adjacent tissue transfer closure of L back and R upper back melanoma defects
Left axillary sentinel lymph node biopsy, Right upper back and Left lower back wide excision of melanoma

## 2019-11-01 NOTE — ASU DISCHARGE PLAN (ADULT/PEDIATRIC) - CALL YOUR DOCTOR IF YOU HAVE ANY OF THE FOLLOWING:
Swelling that gets worse/Wound/Surgical Site with redness, or foul smelling discharge or pus/Bleeding that does not stop/Pain not relieved by Medications Numbness, tingling, color or temperature change to extremity/Nausea and vomiting that does not stop/Unable to urinate/Bleeding that does not stop/Swelling that gets worse/Wound/Surgical Site with redness, or foul smelling discharge or pus/Pain not relieved by Medications

## 2019-11-05 ENCOUNTER — APPOINTMENT (OUTPATIENT)
Dept: PLASTIC SURGERY | Facility: CLINIC | Age: 64
End: 2019-11-05
Payer: MEDICARE

## 2019-11-05 PROCEDURE — 99024 POSTOP FOLLOW-UP VISIT: CPT

## 2019-11-05 NOTE — HISTORY OF PRESENT ILLNESS
[FreeTextEntry1] : Pt s/p excision of melanoma from left mid outer back and right upper back and left axillary sentinel lymph node biopsy.  Pt doing well no complaints

## 2019-11-05 NOTE — REVIEW OF SYSTEMS
[Fever] : no fever [Chills] : no chills [Negative] : Respiratory [de-identified] : Back dressings in place

## 2019-11-05 NOTE — PHYSICAL EXAM
[NI] : Normal [de-identified] : Back incisions healing well no sign of infection no erythema no dehiscence\par Left axillary incision healing well no sign of infection

## 2019-12-02 ENCOUNTER — APPOINTMENT (OUTPATIENT)
Dept: PLASTIC SURGERY | Facility: CLINIC | Age: 64
End: 2019-12-02
Payer: MEDICARE

## 2019-12-02 PROCEDURE — 99024 POSTOP FOLLOW-UP VISIT: CPT

## 2019-12-02 NOTE — REASON FOR VISIT
[Post Op: _________] : a [unfilled] post op visit [FreeTextEntry1] : DOS: 11/01/2019 s/o closure of the back following excision of melanoma from left mid outer back and left axillary sentinel node biopsy. Pt states he is doing well, denies any complaints.

## 2019-12-02 NOTE — PHYSICAL EXAM
[NI] : Normal [de-identified] : Back incisions healed well\par Left axillary incision healed well

## 2019-12-03 ENCOUNTER — APPOINTMENT (OUTPATIENT)
Dept: SURGICAL ONCOLOGY | Facility: CLINIC | Age: 64
End: 2019-12-03
Payer: MEDICARE

## 2019-12-03 VITALS
DIASTOLIC BLOOD PRESSURE: 85 MMHG | OXYGEN SATURATION: 94 % | WEIGHT: 225 LBS | HEART RATE: 60 BPM | SYSTOLIC BLOOD PRESSURE: 137 MMHG | HEIGHT: 72 IN | BODY MASS INDEX: 30.48 KG/M2

## 2019-12-03 PROCEDURE — 99024 POSTOP FOLLOW-UP VISIT: CPT

## 2019-12-05 LAB — SURGICAL PATHOLOGY STUDY: SIGNIFICANT CHANGE UP

## 2019-12-05 NOTE — HISTORY OF PRESENT ILLNESS
[de-identified] : Mr. Martin is a 65 y/o male who was noted to have a suspicious appearing cutaneous lesion in the left mid-back while travelling in [place].  He underwent surgical excision in [place] on 09/26/2019 for which pathology demonstrated a melanoma.\par He is referred for possible additional treatment.\par This is his first cutaneous malignancy.\par He denies associated pain, bleeding or nodularity.\par He offers no other complaints.\par Per pathology report, melanoma reported to be Too's level 2, Breslow depth of 2 mm, pT1a which is inconsistent.\par \par 10/24/2019: Pathologic review of slides from left mid/outer back melanoma excision here at [facility] demonstrates a 1.2 mm thick melanoma with regression.  Since his initial consultation, patient has another biopsy a right upper back lesion which returned as a 0.5 mm thick melanoma.  He feels well and offers no complaints.  Surgery scheduled for 11/01/2019.\par \par 12/03/2019: Patient is s/p wide excision of left mid back melanoma with left axillary sentinel lymph node biopsy and excision of right upper back melanoma 11/01/2019.  Pathology is pending at this time.  He feels well and has no complaints.

## 2019-12-05 NOTE — PHYSICAL EXAM
[FreeTextEntry1] : Well healed left mid back, left axillary and right upper back incisions.  No seromas.

## 2019-12-05 NOTE — ASSESSMENT
[FreeTextEntry1] : Will inform patient of pathology results when available.\par Continue dermatology surveillance.\par Follow up in 4 months.

## 2019-12-16 ENCOUNTER — FORM ENCOUNTER (OUTPATIENT)
Age: 64
End: 2019-12-16

## 2019-12-16 ENCOUNTER — APPOINTMENT (OUTPATIENT)
Dept: SURGERY | Facility: CLINIC | Age: 64
End: 2019-12-16
Payer: MEDICARE

## 2019-12-16 VITALS
BODY MASS INDEX: 32.1 KG/M2 | DIASTOLIC BLOOD PRESSURE: 64 MMHG | HEIGHT: 72 IN | OXYGEN SATURATION: 94 % | HEART RATE: 64 BPM | TEMPERATURE: 98 F | SYSTOLIC BLOOD PRESSURE: 98 MMHG | WEIGHT: 237 LBS

## 2019-12-16 PROCEDURE — 99202 OFFICE O/P NEW SF 15 MIN: CPT

## 2019-12-16 PROCEDURE — 99212 OFFICE O/P EST SF 10 MIN: CPT

## 2019-12-16 NOTE — ASSESSMENT
[FreeTextEntry1] : 65yo M h/o AAA s/p repair, bioprosthetic aortic valve, presenting with midline ventral hernia for elective repair.\par -CT scan of the abdomen to delineate the defect further. The repair options will be discussed once the CT scan is obtained.

## 2019-12-16 NOTE — HISTORY OF PRESENT ILLNESS
[de-identified] : 65yo M with h/o AAA s/p open repair with bioprosthetic aortic valve replacement (2013) presenting for evaluation of ventral hernia. First noticed the hernia 6 years ago, a few months after operation as a bulge in epigastric region - no associated pain, redness, or tenderness. He has not had any abdominal pain or changes in bowel movements. Pt does not take any AC or antiplatelet agents. He would like to discuss options for repair.

## 2019-12-16 NOTE — PHYSICAL EXAM
[Abdominal Masses] : Abdominal mass present [Normal Heart Sounds] : normal heart sounds [Normal Breath Sounds] : Normal breath sounds [Normal Rate and Rhythm] : normal rate and rhythm [No HSM] : no hepatosplenomegaly [Tender] : was nontender [Enlarged] : not enlarged [No Rash or Lesion] : No rash or lesion [Alert] : alert [Oriented to Person] : oriented to person [Oriented to Place] : oriented to place [Oriented to Time] : oriented to time [Calm] : calm [de-identified] : NAD, sitting comfortably in chair [de-identified] : EOMI, moist mucous membranes [de-identified] : No visible masses, no cervical LAD [de-identified] : Bulge noted in epigastrium in midline, no erythema, tenderness, or induration. Bulge increases in size with valsalva. Manually reducible

## 2019-12-17 ENCOUNTER — APPOINTMENT (OUTPATIENT)
Dept: CT IMAGING | Facility: CLINIC | Age: 64
End: 2019-12-17
Payer: MEDICARE

## 2019-12-17 ENCOUNTER — OUTPATIENT (OUTPATIENT)
Dept: OUTPATIENT SERVICES | Facility: HOSPITAL | Age: 64
LOS: 1 days | End: 2019-12-17
Payer: MEDICARE

## 2019-12-17 DIAGNOSIS — Z00.8 ENCOUNTER FOR OTHER GENERAL EXAMINATION: ICD-10-CM

## 2019-12-17 DIAGNOSIS — Z98.890 OTHER SPECIFIED POSTPROCEDURAL STATES: Chronic | ICD-10-CM

## 2019-12-17 DIAGNOSIS — Z95.2 PRESENCE OF PROSTHETIC HEART VALVE: Chronic | ICD-10-CM

## 2019-12-17 DIAGNOSIS — Z95.0 PRESENCE OF CARDIAC PACEMAKER: Chronic | ICD-10-CM

## 2019-12-17 PROCEDURE — 74150 CT ABDOMEN W/O CONTRAST: CPT | Mod: 26

## 2019-12-17 PROCEDURE — 74150 CT ABDOMEN W/O CONTRAST: CPT

## 2020-02-03 ENCOUNTER — APPOINTMENT (OUTPATIENT)
Dept: SURGERY | Facility: CLINIC | Age: 65
End: 2020-02-03
Payer: MEDICARE

## 2020-02-03 VITALS
HEART RATE: 67 BPM | BODY MASS INDEX: 32.1 KG/M2 | WEIGHT: 237 LBS | DIASTOLIC BLOOD PRESSURE: 70 MMHG | TEMPERATURE: 97.6 F | SYSTOLIC BLOOD PRESSURE: 115 MMHG | HEIGHT: 72 IN | OXYGEN SATURATION: 94 %

## 2020-02-03 DIAGNOSIS — K43.9 VENTRAL HERNIA W/OUT OBSTRUCTION OR GANGRENE: ICD-10-CM

## 2020-02-03 PROCEDURE — 99212 OFFICE O/P EST SF 10 MIN: CPT

## 2020-02-03 NOTE — HISTORY OF PRESENT ILLNESS
[de-identified] : 63 yo male with ventral hernia who presents to the office for evaluation of CT scan. He has large epigastric hernia. We will proceed with robotic assisted ventral hernia with mesh. All risk, benefit, alternatives explained and the patient expressed full understanding.

## 2020-04-07 ENCOUNTER — APPOINTMENT (OUTPATIENT)
Dept: SURGICAL ONCOLOGY | Facility: CLINIC | Age: 65
End: 2020-04-07

## 2020-04-24 ENCOUNTER — APPOINTMENT (OUTPATIENT)
Dept: SURGERY | Facility: HOSPITAL | Age: 65
End: 2020-04-24

## 2020-08-11 ENCOUNTER — APPOINTMENT (OUTPATIENT)
Dept: SURGICAL ONCOLOGY | Facility: CLINIC | Age: 65
End: 2020-08-11
Payer: MEDICARE

## 2020-08-11 DIAGNOSIS — C43.59 MALIGNANT MELANOMA OF OTHER PART OF TRUNK: ICD-10-CM

## 2020-08-11 PROCEDURE — 99213 OFFICE O/P EST LOW 20 MIN: CPT

## 2020-08-12 PROBLEM — C43.59 MALIGNANT MELANOMA OF BACK: Status: ACTIVE | Noted: 2019-10-21

## 2020-08-12 NOTE — ASSESSMENT
[FreeTextEntry1] : No clinical evidence of melanoma recurrence.\par \par Plan: Follow up exam in 6 months.  Continue with dermatology follow up.

## 2020-08-12 NOTE — PHYSICAL EXAM
[FreeTextEntry1] : Well healed left mid back, left axillary and right upper back incisions.  No seromas. No evidence of locoregional recurrence or adenopathy. [Normal] : supple, no neck mass and thyroid not enlarged [Normal Supraclavicular Lymph Nodes] : normal supraclavicular lymph nodes [Normal Neck Lymph Nodes] : normal neck lymph nodes  [Normal Groin Lymph Nodes] : normal groin lymph nodes [Normal Axillary Lymph Nodes] : normal axillary lymph nodes [Normal] : grossly intact

## 2020-08-12 NOTE — HISTORY OF PRESENT ILLNESS
[de-identified] : Mr. Martin is a 65 y/o male who was noted to have a suspicious appearing cutaneous lesion in the left mid-back while travelling in [place].  He underwent surgical excision in [place] on 09/26/2019 for which pathology demonstrated a melanoma.\par He is referred for possible additional treatment.\par This is his first cutaneous malignancy.\par He denies associated pain, bleeding or nodularity.\par He offers no other complaints.\par Per pathology report, melanoma reported to be Too's level 2, Breslow depth of 2 mm, pT1a which is inconsistent.\par \par 10/24/2019: Pathologic review of slides from left mid/outer back melanoma excision here at [facility] demonstrates a 1.2 mm thick melanoma with regression.  Since his initial consultation, patient has another biopsy a right upper back lesion which returned as a 0.5 mm thick melanoma.  He feels well and offers no complaints.  Surgery scheduled for 11/01/2019.\par \par 12/03/2019: Patient is s/p wide excision of left mid back melanoma with left axillary sentinel lymph node biopsy and excision of right upper back melanoma 11/01/2019.  Pathology is pending at this time.  He feels well and has no complaints.\par \par 08/11/2020: Final pathology [id number] melanoma of the left lateral back and T1a melanoma of the right upper back.  This was discussed with the patient over the phone.  He presents for follow up exam.  He denies pain or swelling at the operative sites.  He continues with dermatologic exams every 3-4 months.

## 2022-02-22 ENCOUNTER — NON-APPOINTMENT (OUTPATIENT)
Age: 67
End: 2022-02-22

## 2022-02-22 ENCOUNTER — APPOINTMENT (OUTPATIENT)
Dept: OPHTHALMOLOGY | Facility: CLINIC | Age: 67
End: 2022-02-22
Payer: MEDICARE

## 2022-02-22 PROCEDURE — 92004 COMPRE OPH EXAM NEW PT 1/>: CPT

## 2022-11-08 ENCOUNTER — INPATIENT (INPATIENT)
Facility: HOSPITAL | Age: 67
LOS: 8 days | Discharge: HOME | DRG: 286 | End: 2022-11-17
Attending: GENERAL ACUTE CARE HOSPITAL | Admitting: GENERAL ACUTE CARE HOSPITAL
Payer: MEDICARE

## 2022-11-08 VITALS
RESPIRATION RATE: 19 BRPM | TEMPERATURE: 98 F | WEIGHT: 225.09 LBS | DIASTOLIC BLOOD PRESSURE: 77 MMHG | HEIGHT: 72 IN | SYSTOLIC BLOOD PRESSURE: 130 MMHG | OXYGEN SATURATION: 96 % | HEART RATE: 74 BPM

## 2022-11-08 DIAGNOSIS — Z95.2 PRESENCE OF PROSTHETIC HEART VALVE: Chronic | ICD-10-CM

## 2022-11-08 DIAGNOSIS — Z98.890 OTHER SPECIFIED POSTPROCEDURAL STATES: Chronic | ICD-10-CM

## 2022-11-08 DIAGNOSIS — R06.02 SHORTNESS OF BREATH: ICD-10-CM

## 2022-11-08 DIAGNOSIS — I26.99 OTHER PULMONARY EMBOLISM WITHOUT ACUTE COR PULMONALE: ICD-10-CM

## 2022-11-08 DIAGNOSIS — Z95.0 PRESENCE OF CARDIAC PACEMAKER: Chronic | ICD-10-CM

## 2022-11-08 LAB
ALBUMIN SERPL ELPH-MCNC: 4 G/DL — SIGNIFICANT CHANGE UP (ref 3.3–5)
ALBUMIN SERPL ELPH-MCNC: 4.5 G/DL — SIGNIFICANT CHANGE UP (ref 3.3–5)
ALP SERPL-CCNC: 114 U/L — SIGNIFICANT CHANGE UP (ref 40–120)
ALP SERPL-CCNC: 98 U/L — SIGNIFICANT CHANGE UP (ref 40–120)
ALT FLD-CCNC: 163 U/L — HIGH (ref 10–45)
ALT FLD-CCNC: 199 U/L — HIGH (ref 10–45)
ANION GAP SERPL CALC-SCNC: 10 MMOL/L — SIGNIFICANT CHANGE UP (ref 5–17)
ANION GAP SERPL CALC-SCNC: 15 MMOL/L — SIGNIFICANT CHANGE UP (ref 5–17)
APTT BLD: 24.4 SEC — LOW (ref 27.5–35.5)
AST SERPL-CCNC: 118 U/L — HIGH (ref 10–40)
AST SERPL-CCNC: 195 U/L — HIGH (ref 10–40)
BASOPHILS # BLD AUTO: 0.03 K/UL — SIGNIFICANT CHANGE UP (ref 0–0.2)
BASOPHILS NFR BLD AUTO: 0.3 % — SIGNIFICANT CHANGE UP (ref 0–2)
BILIRUB SERPL-MCNC: 0.8 MG/DL — SIGNIFICANT CHANGE UP (ref 0.2–1.2)
BILIRUB SERPL-MCNC: 1.5 MG/DL — HIGH (ref 0.2–1.2)
BUN SERPL-MCNC: 31 MG/DL — HIGH (ref 7–23)
BUN SERPL-MCNC: 33 MG/DL — HIGH (ref 7–23)
CALCIUM SERPL-MCNC: 10 MG/DL — SIGNIFICANT CHANGE UP (ref 8.4–10.5)
CALCIUM SERPL-MCNC: 9.3 MG/DL — SIGNIFICANT CHANGE UP (ref 8.4–10.5)
CHLORIDE SERPL-SCNC: 101 MMOL/L — SIGNIFICANT CHANGE UP (ref 96–108)
CHLORIDE SERPL-SCNC: 105 MMOL/L — SIGNIFICANT CHANGE UP (ref 96–108)
CK MB CFR SERPL CALC: 3 NG/ML — SIGNIFICANT CHANGE UP (ref 0–6.7)
CK SERPL-CCNC: 92 U/L — SIGNIFICANT CHANGE UP (ref 30–200)
CO2 SERPL-SCNC: 22 MMOL/L — SIGNIFICANT CHANGE UP (ref 22–31)
CO2 SERPL-SCNC: 23 MMOL/L — SIGNIFICANT CHANGE UP (ref 22–31)
CREAT SERPL-MCNC: 0.99 MG/DL — SIGNIFICANT CHANGE UP (ref 0.5–1.3)
CREAT SERPL-MCNC: 1.42 MG/DL — HIGH (ref 0.5–1.3)
EGFR: 53 ML/MIN/1.73M2 — LOW
EGFR: 82 ML/MIN/1.73M2 — SIGNIFICANT CHANGE UP
EOSINOPHIL # BLD AUTO: 0.03 K/UL — SIGNIFICANT CHANGE UP (ref 0–0.5)
EOSINOPHIL NFR BLD AUTO: 0.3 % — SIGNIFICANT CHANGE UP (ref 0–6)
FLUAV AG NPH QL: SIGNIFICANT CHANGE UP
FLUBV AG NPH QL: SIGNIFICANT CHANGE UP
GLUCOSE SERPL-MCNC: 122 MG/DL — HIGH (ref 70–99)
GLUCOSE SERPL-MCNC: 169 MG/DL — HIGH (ref 70–99)
HCT VFR BLD CALC: 45.3 % — SIGNIFICANT CHANGE UP (ref 39–50)
HGB BLD-MCNC: 14.9 G/DL — SIGNIFICANT CHANGE UP (ref 13–17)
IMM GRANULOCYTES NFR BLD AUTO: 0.4 % — SIGNIFICANT CHANGE UP (ref 0–0.9)
INR BLD: 1.12 RATIO — SIGNIFICANT CHANGE UP (ref 0.88–1.16)
LYMPHOCYTES # BLD AUTO: 19.5 % — SIGNIFICANT CHANGE UP (ref 13–44)
LYMPHOCYTES # BLD AUTO: 2.26 K/UL — SIGNIFICANT CHANGE UP (ref 1–3.3)
MAGNESIUM SERPL-MCNC: 2.2 MG/DL — SIGNIFICANT CHANGE UP (ref 1.6–2.6)
MCHC RBC-ENTMCNC: 31.5 PG — SIGNIFICANT CHANGE UP (ref 27–34)
MCHC RBC-ENTMCNC: 32.9 GM/DL — SIGNIFICANT CHANGE UP (ref 32–36)
MCV RBC AUTO: 95.8 FL — SIGNIFICANT CHANGE UP (ref 80–100)
MONOCYTES # BLD AUTO: 0.71 K/UL — SIGNIFICANT CHANGE UP (ref 0–0.9)
MONOCYTES NFR BLD AUTO: 6.1 % — SIGNIFICANT CHANGE UP (ref 2–14)
NEUTROPHILS # BLD AUTO: 8.49 K/UL — HIGH (ref 1.8–7.4)
NEUTROPHILS NFR BLD AUTO: 73.4 % — SIGNIFICANT CHANGE UP (ref 43–77)
NRBC # BLD: 0 /100 WBCS — SIGNIFICANT CHANGE UP (ref 0–0)
NT-PROBNP SERPL-SCNC: 9278 PG/ML — HIGH (ref 0–300)
PLATELET # BLD AUTO: 176 K/UL — SIGNIFICANT CHANGE UP (ref 150–400)
POTASSIUM SERPL-MCNC: 4.7 MMOL/L — SIGNIFICANT CHANGE UP (ref 3.5–5.3)
POTASSIUM SERPL-MCNC: 4.9 MMOL/L — SIGNIFICANT CHANGE UP (ref 3.5–5.3)
POTASSIUM SERPL-SCNC: 4.7 MMOL/L — SIGNIFICANT CHANGE UP (ref 3.5–5.3)
POTASSIUM SERPL-SCNC: 4.9 MMOL/L — SIGNIFICANT CHANGE UP (ref 3.5–5.3)
PROT SERPL-MCNC: 7.3 G/DL — SIGNIFICANT CHANGE UP (ref 6–8.3)
PROT SERPL-MCNC: 7.9 G/DL — SIGNIFICANT CHANGE UP (ref 6–8.3)
PROTHROM AB SERPL-ACNC: 13 SEC — SIGNIFICANT CHANGE UP (ref 10.5–13.4)
RBC # BLD: 4.73 M/UL — SIGNIFICANT CHANGE UP (ref 4.2–5.8)
RBC # FLD: 13.9 % — SIGNIFICANT CHANGE UP (ref 10.3–14.5)
RSV RNA NPH QL NAA+NON-PROBE: SIGNIFICANT CHANGE UP
SARS-COV-2 RNA SPEC QL NAA+PROBE: SIGNIFICANT CHANGE UP
SODIUM SERPL-SCNC: 138 MMOL/L — SIGNIFICANT CHANGE UP (ref 135–145)
SODIUM SERPL-SCNC: 138 MMOL/L — SIGNIFICANT CHANGE UP (ref 135–145)
TROPONIN T, HIGH SENSITIVITY RESULT: 23 NG/L — SIGNIFICANT CHANGE UP (ref 0–51)
TROPONIN T, HIGH SENSITIVITY RESULT: 25 NG/L — SIGNIFICANT CHANGE UP (ref 0–51)
WBC # BLD: 11.57 K/UL — HIGH (ref 3.8–10.5)
WBC # FLD AUTO: 11.57 K/UL — HIGH (ref 3.8–10.5)

## 2022-11-08 PROCEDURE — 93308 TTE F-UP OR LMTD: CPT | Mod: 26

## 2022-11-08 PROCEDURE — 71045 X-RAY EXAM CHEST 1 VIEW: CPT | Mod: 26

## 2022-11-08 PROCEDURE — 71275 CT ANGIOGRAPHY CHEST: CPT | Mod: 26,MA

## 2022-11-08 PROCEDURE — 99285 EMERGENCY DEPT VISIT HI MDM: CPT | Mod: CS

## 2022-11-08 RX ORDER — FUROSEMIDE 40 MG
20 TABLET ORAL ONCE
Refills: 0 | Status: COMPLETED | OUTPATIENT
Start: 2022-11-08 | End: 2022-11-08

## 2022-11-08 RX ORDER — ENOXAPARIN SODIUM 100 MG/ML
100 INJECTION SUBCUTANEOUS ONCE
Refills: 0 | Status: COMPLETED | OUTPATIENT
Start: 2022-11-08 | End: 2022-11-08

## 2022-11-08 RX ORDER — FUROSEMIDE 40 MG
40 TABLET ORAL DAILY
Refills: 0 | Status: DISCONTINUED | OUTPATIENT
Start: 2022-11-08 | End: 2022-11-10

## 2022-11-08 RX ADMIN — ENOXAPARIN SODIUM 100 MILLIGRAM(S): 100 INJECTION SUBCUTANEOUS at 18:15

## 2022-11-08 RX ADMIN — Medication 20 MILLIGRAM(S): at 10:35

## 2022-11-08 NOTE — ED ADULT NURSE REASSESSMENT NOTE - NS ED NURSE REASSESS COMMENT FT1
Pt stating he has increased SOB at the moment and difficulty breathing, Placed on 2L NC. Pt stating he has increased SOB at the moment and difficulty breathing, MD Mccartney and MD Dean aware, Repeat EKG done

## 2022-11-08 NOTE — ED PROVIDER NOTE - PROGRESS NOTE DETAILS
Chris Mccartney MD:  Patient becomes hypoxemic to 88 while sleeping, per patient usually sleeps with CPAP. Nagi, PGY3: received patient upon signout;67 Y M H/O HTN, BPH, pacemaker, presenting with new onset dyspnea likely secondary to pulm htn w/ worsening HF. Patient TBA to Collin Nagi, pGY3: discussed case w/ Dr. Polanco. Attending Corry Doan: cta negative for PE, pt requriing supplemental oxygen. mild transaminits suspect likely hepatic congestion. will give diuretic and admi

## 2022-11-08 NOTE — ED ADULT NURSE NOTE - OBJECTIVE STATEMENT
66y/o Male presents to ED from home with c/o SOB. Pt states s/s started 1 day ago and have progressively gotten worse. SOB is exacerbated by movement and relieved while lying down. PMH HTN, aortic aneurysm repair, heart valve replacement, pacemaker. Denies chest pain, N/V/D, numbness, tingling, fever, chills. A&Ox3, airway patent with spontaneous breathing, equal B/L upper and lower extremity strength, no diaphoreses or edema. Pt placed on continuous cardiac monitor NSR, safety maintained bed in lowest position and locked. 66y/o Male presents to ED from home with c/o SOB. Pt states s/s started 1 day ago and have progressively gotten worse. SOB is exacerbated by movement and relieved while lying down. Son at bedside. PMH HTN, aortic aneurysm repair, heart valve replacement, pacemaker. Denies chest pain, N/V/D, numbness, tingling, fever, chills. A&Ox3, airway patent with spontaneous breathing, equal B/L upper and lower extremity strength, no diaphoreses. Pt appears pale. Pt placed on continuous cardiac monitor NSR, safety maintained bed in lowest position and locked.

## 2022-11-08 NOTE — ED PROVIDER NOTE - ATTENDING CONTRIBUTION TO CARE
MD Dean:  patient seen and evaluated personally.   I agree with the History & Physical,  Impression & Plan other than what was detailed in my note.  MD Dean  68 yo m of htn, hld, pacemaker, valve replacement, presenting to ed w cc of sob, came on earlier today, [place] from airport, son states he looked unwell, reported to have labored breathing, nausea and one episode of vomiting, no cough, no sob, no diarrhea, no ha, no bv, no chest pain, no syncope, no bloody stools, pt appears pale, afebrile vitals stable  appears unwell,  NC/AT,  conjunctiva non conjected, sclera anicteric, moist mucous membranes, neck supple, heart sounds, normal, no mrg, lungs cta b/l no wrr, abd soft non distended w/ no tenderness, no visual deformities of extremities, axox3, , normal mood and affect, ekg shows paced rhythm no sig st changes, neg sgcarbossa, concern for anginal equivalent, pt feeling better, possible anemia as pt was pale, cbc, cmp, trop, cxr, pro bnp. MD Dean:  patient seen and evaluated personally.   I agree with the History & Physical,  Impression & Plan other than what was detailed in my note.  MD Dean  mdm

## 2022-11-08 NOTE — H&P ADULT - NSICDXPASTSURGICALHX_GEN_ALL_CORE_FT
PAST SURGICAL HISTORY:  Cardiac pacemaker 05/2013    History of heart valve replacement 2005, 2013, porcine    S/P abdominal aortic aneurysm repair 2013

## 2022-11-08 NOTE — ED ADULT NURSE NOTE - NSIMPLEMENTINTERV_GEN_ALL_ED
Implemented All Fall Risk Interventions:  [place] to call system. Call bell, personal items and telephone within reach. Instruct patient to call for assistance. Room bathroom lighting operational. Non-slip footwear when patient is off stretcher. Physically safe environment: no spills, clutter or unnecessary equipment. Stretcher in lowest position, wheels locked, appropriate side rails in place. Provide visual cue, wrist band, yellow gown, etc. Monitor gait and stability. Monitor for mental status changes and reorient to person, place, and time. Review medications for side effects contributing to fall risk. Reinforce activity limits and safety measures with patient and family.

## 2022-11-08 NOTE — H&P ADULT - NSICDXPASTMEDICALHX_GEN_ALL_CORE_FT
PAST MEDICAL HISTORY:  Cardiac murmur     History of BPH     Hypertension     Malignant melanoma left back

## 2022-11-08 NOTE — ED ADULT NURSE NOTE - HISTORY OF COVID-19 VACCINATION
- mostly affecting bilateral knees and back  - takes acetaminophen 500mg in the am and 1000mg in the pm  - pain currently 0/10  - cont as now    Yes

## 2022-11-08 NOTE — ED PROVIDER NOTE - CLINICAL SUMMARY MEDICAL DECISION MAKING FREE TEXT BOX
Attending Jermaine:  68 yo m of htn, hld, pacemaker, valve replacement, presenting to ed w cc of sob, came on earlier today, [place] son from airport, son states he looked unwell, reported to have labored breathing, nausea and one episode of vomiting, no cough, no sob, no diarrhea, no ha, no bv, no chest pain, no syncope, no bloody stools, pt appears pale, afebrile vitals stable  appears unwell,  NC/AT,  conjunctiva non conjected, sclera anicteric, moist mucous membranes, neck supple, heart sounds, normal, no mrg, lungs cta b/l no wrr, abd soft non distended w/ no tenderness, no visual deformities of extremities, axox3, , normal mood and affect, ekg shows paced rhythm no sig st changes, neg sgcarbossa, concern for anginal equivalent, pt feeling better, possible anemia as pt was pale, cbc, cmp, trop, cxr, pro bnp.

## 2022-11-08 NOTE — H&P ADULT - HISTORY OF PRESENT ILLNESS
66 y/o male with hx of DVT ( two sepearte occasions ) was on eleiquis ( last used 6 months ago ) , AVR ( bovine ) ( repaired twice ?),  s/p PPM HTN, BPH, pacemaker, admitted with 2 day hx of SOB , exertional and worsened last night . NO cp   no fever or chills .  no N/V /cough   mild LE swelling   CT: No pulmonary embolism through the segmental branches. Several   subsegmental branches cannot be accurately evaluated.    Mild interstitial edema.  o acute changes on EKG reportedly   trop neg

## 2022-11-08 NOTE — ED PROVIDER NOTE - OBJECTIVE STATEMENT
67 Y M H/O HTN, BPH, pacemaker, presenting with new onset dyspnea. States tonight at ~2 am experienced new onset difficulty breathing, SOB, mild improvement with rest, asymptomatic at this time. Denies any fever, chills, nausea, vomiting, chest pain, extremity weakness.

## 2022-11-08 NOTE — ED PROVIDER NOTE - IV ALTEPLASE INCLUSION HIDDEN
[facility]    CHIEF COMPLAINT:   Chief Complaint   Patient presents with    Follow-up     left knee procedure 8/13/18     HISTORY OF PRESENT ILLNESS: Franca Coffey is a 68 y.o. female who presents to me in follow-up status post cooled radiofrequency ablation of the genicular branches to the left knee.  We are about 3 weeks status post at this point and she is doing well.  She notes more significant pain relief over the lateral left knee.  There is some persistent pain over the medial knee but she feels this is improving.  She rates her pain currently as a 5 on a scale of 1-10.  She feels she may have of redundant over the weekend playing with her great grandchildren.  She denies any significant swelling.  She denies any pain with walking today.    Review of Systems   Constitutional: Negative for fever.   HENT: Negative for drooling.    Eyes: Negative for discharge.   Respiratory: Negative for choking.    Cardiovascular: Negative for chest pain.   Genitourinary: Negative for flank pain.   Skin: Negative for wound.   Allergic/Immunologic: Negative for immunocompromised state.   Neurological: Negative for tremors and syncope.   Psychiatric/Behavioral: Negative for behavioral problems.     Past Medical History:   Past Medical History:   Diagnosis Date    Anxiety     Cataract     Depression     Diabetes mellitus     managing with diet    Dizzy     Fibromyalgia     GERD (gastroesophageal reflux disease)     History of bladder infections     Hypertension     Hypothyroid     IBS (irritable bowel syndrome)     Kidney stones     Meniere disease     Migraine     Muscle spasms of head and/or neck     and lower ext    Osteoarthritis     PONV (postoperative nausea and vomiting)     severe-cause by morphine per pt    Sleep apnea     CPAP       Past Surgical History:   Past Surgical History:   Procedure Laterality Date    BLADDER SUSPENSION      BREAST BIOPSY      COLONOSCOPY   8/27/14    Dr. Thomas, 5 year recheck    dental implant      upper RT implant    EYE SURGERY      bilateral PHACO with IOL    FOOT SURGERY Bilateral 12/05/2008    bunionectomy    FRACTURE SURGERY      HYSTERECTOMY      JOINT REPLACEMENT Left     Partial knee    JOINT REPLACEMENT Left     Total knee replacement    KNEE ARTHROSCOPY Bilateral     MANDIBLE FRACTURE SURGERY      MULTIPLE TOOTH EXTRACTIONS      revision of mesh      repair to bladder suspension    TONSILLECTOMY, ADENOIDECTOMY      UPPER GASTROINTESTINAL ENDOSCOPY      VAGINAL DELIVERY      times 2       Family History:   Family History   Problem Relation Age of Onset    Diabetes Mother     Heart disease Mother     Kidney disease Mother     Hypertension Mother     Hyperlipidemia Mother     Diabetes Mellitus Mother     Heart disease Father     Diabetes Father     Hypertension Father     Hyperlipidemia Father     Diabetes Mellitus Father     COPD Father     Heart disease Brother     Cancer Brother     Early death Brother     Stomach cancer Brother     Breast cancer Maternal Aunt     Heart disease Brother     Ovarian cancer Neg Hx     Cataracts Neg Hx     Glaucoma Neg Hx     Macular degeneration Neg Hx     Retinal detachment Neg Hx     Thyroid disease Neg Hx     Stroke Neg Hx     Rheum arthritis Neg Hx     Psoriasis Neg Hx     Osteoarthritis Neg Hx     Lupus Neg Hx     Inflammatory bowel disease Neg Hx     Depression Neg Hx     Chronic back pain Neg Hx     Asthma Neg Hx        Medications:   Current Outpatient Medications on File Prior to Visit   Medication Sig Dispense Refill    atorvastatin (LIPITOR) 40 MG tablet TAKE 1 TABLET EVERY DAY 90 tablet 2    b complex vitamins tablet Take 1 tablet by mouth once daily.      BD ALCOHOL SWABS PadM       buPROPion (WELLBUTRIN XL) 300 MG 24 hr tablet Take 1 tablet (300 mg total) by mouth once daily. 90 tablet 1    chlordiazepoxide-clidinium 5-2.5 mg (LIBRAX) 5-2.5 mg  Cap Take by mouth. 2 qam, 1 q noon, 1 qhs  0    DULoxetine (CYMBALTA) 60 MG capsule TAKE 1 CAPSULE TWICE DAILY 180 capsule 0    ergocalciferol (VITAMIN D2) 50,000 unit Cap Take 50,000 Units by mouth every 7 days.      esomeprazole (NEXIUM) 40 MG capsule TAKE 1 CAPSULE EVERY DAY 90 capsule 1    furosemide (LASIX) 20 MG tablet TAKE 1 TABLET EVERY DAY 90 tablet 0    gabapentin (NEURONTIN) 100 MG capsule TAKE 1 CAPSULE BY MOUTH ONCE DAILY 90 capsule 0    gabapentin (NEURONTIN) 400 MG capsule Take 1 capsule (400 mg total) by mouth every evening. 90 capsule 1    levothyroxine (SYNTHROID) 75 MCG tablet TAKE 1 TABLET DAILY BEFORE BREAKFAST 90 tablet 3    lisinopril (PRINIVIL,ZESTRIL) 5 MG tablet Take 1 tablet (5 mg total) by mouth once daily. 90 tablet 2    metoprolol succinate (TOPROL-XL) 25 MG 24 hr tablet TAKE 1 TABLET EVERY DAY 90 tablet 0    topiramate (TOPAMAX) 50 MG tablet TAKE 1 TABLET ONE TIME DAILY 90 tablet 3    TRUE METRIX AIR GLUCOSE METER kit       TRUE METRIX GLUCOSE TEST STRIP Strp       TRUE METRIX LEVEL 1 Soln       TRUEPLUS LANCETS 28 gauge Misc        No current facility-administered medications on file prior to visit.        Allergies:   Review of patient's allergies indicates:   Allergen Reactions    Sulfa (sulfonamide antibiotics) Hives, Itching and Rash    Penicillins      Other reaction(s): Unknown. Childhood reaction. Pt does not remember reaction.    Adhesive Itching and Rash     Paper tape OK    Garlic Diarrhea    Morphine Nausea And Vomiting       Social History:   Social History     Socioeconomic History    Marital status:      Spouse name: None    Number of children: None    Years of education: None    Highest education level: None   Social Needs    Financial resource strain: None    Food insecurity - worry: None    Food insecurity - inability: None    Transportation needs - medical: None    Transportation needs - non-medical: None   Occupational History     "None   Tobacco Use    Smoking status: Former Smoker     Packs/day: 0.25     Years: 1.00     Pack years: 0.25     Last attempt to quit: 10/15/1966     Years since quittin.9    Smokeless tobacco: Never Used   Substance and Sexual Activity    Alcohol use: Yes     Comment: occasionally    Drug use: No    Sexual activity: Not Currently     Partners: Male     Birth control/protection: Post-menopausal, Surgical   Other Topics Concern    None   Social History Narrative    None     PHYSICAL EXAMINATION:   General    Vitals:    18 1025   BP: (!) 142/83   Pulse: 67   Weight: 81.6 kg (180 lb)   Height: 5' 2" (1.575 m)     Constitutional: Oriented to person, place, and time. No apparent distress. Appears well-developed and well-nourished. Pleasant.  HENT:   Head: Normocephalic and atraumatic.   Eyes: Right eye exhibits no discharge. Left eye exhibits no discharge. No scleral icterus.   Pulmonary/Chest: Effort normal. No respiratory distress.   Abdominal: There is no guarding.   Neurological: Alert and oriented to person, place, and time.   Psychiatric: Behavior is normal.   Right Knee Exam     Tenderness   The patient is experiencing tenderness in the medial joint line.    Range of Motion   Extension: 0   Flexion: 130     Other   Erythema: absent  Scars: absent  Sensation: normal  Pulse: present  Swelling: none  Effusion: no effusion present      Left Knee Exam     Tenderness   The patient is experiencing tenderness in the medial joint line.    Range of Motion   Extension: 0   Flexion: 140     Tests   Varus: negative Valgus: negative  Lachman:  Anterior - negative    Posterior - negative  Patellar apprehension: negative    Other   Erythema: absent  Scars: present  Sensation: normal  Pulse: present  Swelling: none  Effusion: no effusion present        INSPECTION: There is no swelling, ecchymoses, erythema or gross deformity.  GAIT[facility]DYNAMIC: [facility]    Imaging  X-ray left knee from 2016: well seated total knee " hardware in place    Data Reviewed: X-ray    Supportive Actions: Independent visualization of images or test specimens    ASSESSMENT:   1. Chronic knee pain, unspecified laterality    2. Chronic pain of left knee      PLAN:     1.  She is noting reduction in her pain s/p radiofrequency procedure to the left knee.  We discussed her opportunity to now be more physically active. We discussed the potential for more pain relief over the next few weeks.    2. In terms of her right knee, she would like to return to see Dr. Cuellar to discuss potential knee arthroscopy.    3. She can follow-up on a when necessary basis.    The above note was completed, in part, with the aid of Dragon dictation software/hardware. Translation errors may be present.     show

## 2022-11-08 NOTE — H&P ADULT - ASSESSMENT
66 y/o male with hx of DVT ( two sepearte occasions ) was on eleiquis ( last used 6 months ago ) , AVR ( bovine ) ( repaired twice ?),  s/p PPM HTN, BPH, pacemaker, admitted with 2 day hx of SOB , exertional and worsened last night . NO cp   no fever or chills .  no N/V /cough   mild LE swelling   CT: No pulmonary embolism through the segmental branches. Several   subsegmental branches cannot be accurately evaluated.    Mild interstitial edema.  no  acute changes on EKG  trop neg     acute onset dyspnea :? sec to acute onset of CHF vs PE    check Echo   tele   cardio input   improved with lasix ... cont same   will need ppm check   check Va duplex r/o dvt     eleated LFT : ? sec to congestion   monitor

## 2022-11-09 LAB
ALBUMIN SERPL ELPH-MCNC: 3.8 G/DL — SIGNIFICANT CHANGE UP (ref 3.3–5)
ALP SERPL-CCNC: 84 U/L — SIGNIFICANT CHANGE UP (ref 40–120)
ALT FLD-CCNC: 126 U/L — HIGH (ref 10–45)
ANION GAP SERPL CALC-SCNC: 11 MMOL/L — SIGNIFICANT CHANGE UP (ref 5–17)
AST SERPL-CCNC: 58 U/L — HIGH (ref 10–40)
BASOPHILS # BLD AUTO: 0.03 K/UL — SIGNIFICANT CHANGE UP (ref 0–0.2)
BASOPHILS NFR BLD AUTO: 0.3 % — SIGNIFICANT CHANGE UP (ref 0–2)
BILIRUB SERPL-MCNC: 1 MG/DL — SIGNIFICANT CHANGE UP (ref 0.2–1.2)
BUN SERPL-MCNC: 26 MG/DL — HIGH (ref 7–23)
CALCIUM SERPL-MCNC: 9.2 MG/DL — SIGNIFICANT CHANGE UP (ref 8.4–10.5)
CHLORIDE SERPL-SCNC: 105 MMOL/L — SIGNIFICANT CHANGE UP (ref 96–108)
CO2 SERPL-SCNC: 25 MMOL/L — SIGNIFICANT CHANGE UP (ref 22–31)
CREAT SERPL-MCNC: 0.92 MG/DL — SIGNIFICANT CHANGE UP (ref 0.5–1.3)
EGFR: 90 ML/MIN/1.73M2 — SIGNIFICANT CHANGE UP
EOSINOPHIL # BLD AUTO: 0.06 K/UL — SIGNIFICANT CHANGE UP (ref 0–0.5)
EOSINOPHIL NFR BLD AUTO: 0.6 % — SIGNIFICANT CHANGE UP (ref 0–6)
GLUCOSE SERPL-MCNC: 93 MG/DL — SIGNIFICANT CHANGE UP (ref 70–99)
HCT VFR BLD CALC: 40.1 % — SIGNIFICANT CHANGE UP (ref 39–50)
HCV AB S/CO SERPL IA: 0.14 S/CO — SIGNIFICANT CHANGE UP (ref 0–0.99)
HCV AB SERPL-IMP: SIGNIFICANT CHANGE UP
HGB BLD-MCNC: 13.4 G/DL — SIGNIFICANT CHANGE UP (ref 13–17)
IMM GRANULOCYTES NFR BLD AUTO: 0.4 % — SIGNIFICANT CHANGE UP (ref 0–0.9)
LYMPHOCYTES # BLD AUTO: 1.83 K/UL — SIGNIFICANT CHANGE UP (ref 1–3.3)
LYMPHOCYTES # BLD AUTO: 16.9 % — SIGNIFICANT CHANGE UP (ref 13–44)
MCHC RBC-ENTMCNC: 31.9 PG — SIGNIFICANT CHANGE UP (ref 27–34)
MCHC RBC-ENTMCNC: 33.4 GM/DL — SIGNIFICANT CHANGE UP (ref 32–36)
MCV RBC AUTO: 95.5 FL — SIGNIFICANT CHANGE UP (ref 80–100)
MONOCYTES # BLD AUTO: 0.95 K/UL — HIGH (ref 0–0.9)
MONOCYTES NFR BLD AUTO: 8.8 % — SIGNIFICANT CHANGE UP (ref 2–14)
NEUTROPHILS # BLD AUTO: 7.93 K/UL — HIGH (ref 1.8–7.4)
NEUTROPHILS NFR BLD AUTO: 73 % — SIGNIFICANT CHANGE UP (ref 43–77)
NRBC # BLD: 0 /100 WBCS — SIGNIFICANT CHANGE UP (ref 0–0)
PLATELET # BLD AUTO: 153 K/UL — SIGNIFICANT CHANGE UP (ref 150–400)
POTASSIUM SERPL-MCNC: 4.3 MMOL/L — SIGNIFICANT CHANGE UP (ref 3.5–5.3)
POTASSIUM SERPL-SCNC: 4.3 MMOL/L — SIGNIFICANT CHANGE UP (ref 3.5–5.3)
PROT SERPL-MCNC: 7 G/DL — SIGNIFICANT CHANGE UP (ref 6–8.3)
RBC # BLD: 4.2 M/UL — SIGNIFICANT CHANGE UP (ref 4.2–5.8)
RBC # FLD: 13.9 % — SIGNIFICANT CHANGE UP (ref 10.3–14.5)
SODIUM SERPL-SCNC: 141 MMOL/L — SIGNIFICANT CHANGE UP (ref 135–145)
WBC # BLD: 10.84 K/UL — HIGH (ref 3.8–10.5)
WBC # FLD AUTO: 10.84 K/UL — HIGH (ref 3.8–10.5)

## 2022-11-09 PROCEDURE — 78452 HT MUSCLE IMAGE SPECT MULT: CPT | Mod: 26

## 2022-11-09 PROCEDURE — 93018 CV STRESS TEST I&R ONLY: CPT

## 2022-11-09 PROCEDURE — 93016 CV STRESS TEST SUPVJ ONLY: CPT

## 2022-11-09 PROCEDURE — 93970 EXTREMITY STUDY: CPT | Mod: 26

## 2022-11-09 PROCEDURE — 93306 TTE W/DOPPLER COMPLETE: CPT | Mod: 26

## 2022-11-09 RX ORDER — ATORVASTATIN CALCIUM 80 MG/1
20 TABLET, FILM COATED ORAL AT BEDTIME
Refills: 0 | Status: DISCONTINUED | OUTPATIENT
Start: 2022-11-09 | End: 2022-11-17

## 2022-11-09 RX ORDER — METOPROLOL TARTRATE 50 MG
100 TABLET ORAL DAILY
Refills: 0 | Status: DISCONTINUED | OUTPATIENT
Start: 2022-11-09 | End: 2022-11-11

## 2022-11-09 RX ORDER — FINASTERIDE 5 MG/1
5 TABLET, FILM COATED ORAL DAILY
Refills: 0 | Status: DISCONTINUED | OUTPATIENT
Start: 2022-11-09 | End: 2022-11-17

## 2022-11-09 RX ORDER — TAMSULOSIN HYDROCHLORIDE 0.4 MG/1
0.4 CAPSULE ORAL AT BEDTIME
Refills: 0 | Status: DISCONTINUED | OUTPATIENT
Start: 2022-11-09 | End: 2022-11-17

## 2022-11-09 RX ORDER — ASPIRIN/CALCIUM CARB/MAGNESIUM 324 MG
81 TABLET ORAL DAILY
Refills: 0 | Status: DISCONTINUED | OUTPATIENT
Start: 2022-11-09 | End: 2022-11-17

## 2022-11-09 RX ADMIN — Medication 100 MILLIGRAM(S): at 17:32

## 2022-11-09 RX ADMIN — FINASTERIDE 5 MILLIGRAM(S): 5 TABLET, FILM COATED ORAL at 17:32

## 2022-11-09 RX ADMIN — Medication 40 MILLIGRAM(S): at 05:58

## 2022-11-09 RX ADMIN — TAMSULOSIN HYDROCHLORIDE 0.4 MILLIGRAM(S): 0.4 CAPSULE ORAL at 22:07

## 2022-11-09 NOTE — PROGRESS NOTE ADULT - ASSESSMENT
68 y/o male with hx of DVT ( two sepearte occasions ) was on eleiquis ( last used 6 months ago ) , AVR ( bovine ) ( repaired twice ?),  s/p PPM HTN, BPH, pacemaker, admitted with 2 day hx of SOB , exertional and worsened last night . NO cp   no fever or chills .  no N/V /cough   mild LE swelling   CT: No pulmonary embolism through the segmental branches. Several   subsegmental branches cannot be accurately evaluated.    Mild interstitial edema.  no  acute changes on EKG  trop neg     acute onset dyspnea :? sec to acute onset of CHF   Echo :NL  tele   cardio input noted ..stress abn : will discuss cath   improved with lasix ... cont same   will need ppm check   add asa, lipitor       eleated LFT : ? sec to congestion   monitor

## 2022-11-09 NOTE — CONSULT NOTE ADULT - SUBJECTIVE AND OBJECTIVE BOX
CARDIOLOGY CONSULT - Dr. Carrera         HPI:  68 y/o male with hx of DVT ( two sepearte occasions ) was on eleiquis ( last used 6 months ago ) , sp  AVR ( bovine ) ( repaired twice ?),  s/p PPM HTN, BPH,, admitted with 2 day hx of SOB , exertional and worsened last night .  pt with + mild LE swelling           PAST MEDICAL & SURGICAL HISTORY:  Hypertension      History of BPH      Cardiac murmur      Malignant melanoma  left back      Cardiac pacemaker  05/2013      History of heart valve replacement  2005, 2013, porcine      S/P abdominal aortic aneurysm repair  2013              PREVIOUS DIAGNOSTIC TESTING:    Nuclear Stress Test-Pharmacologic (06.03.15 @ 12:15) >  ------------------------------------------------------------------------  IMPRESSIONS:Normal Study  * Chest Pain: No chest pain with administration of  Regadenoson.  * Symptom: Shortness of breath.  * HR Response: Appropriate.  * BP Response: Appropriate.  * Heart Rhythm: Sinus Rhythm - 66 BPM.  * Baseline ECG: Nonspecific ST-T wave abnormality.  * ECG Changes: No ischemic ST segment changes.  * Arrhythmia: None.  * Review of raw data shows: Minor motion artifact.  * The left ventricle was enlarged. There is a small, mild  defect in the apex that is mostly fixed and demonstrates  preserved wall motion suggestive of attenuation artifact.  * Post-stress gated wall motion analysis was performed  (LVEF = 62 %;LVEDV = 136 ml.) revealing paradoxical septal  motion consistent with a post-operative state.  ------------------------------------------------------------------------        MEDICATIONS:  Home Medications:  finasteride 5 mg oral tablet: 1 tab(s) orally once a day (08 Nov 2022 11:36)  metoprolol succinate 100 mg oral tablet, extended release: 1 tab(s) orally once a day (08 Nov 2022 11:36)  tamsulosin 0.4 mg oral capsule: 1 cap(s) orally 2 times a day (08 Nov 2022 11:36)  Vitamin C 1000 mg oral tablet: 1 tab(s) orally once a day (08 Nov 2022 11:36)  Vitamin D3 1000 intl units oral tablet: 1 tab(s) orally once a day (08 Nov 2022 11:36)      MEDICATIONS  (STANDING):  furosemide   Injectable 40 milliGRAM(s) IV Push daily      FAMILY HISTORY:  No pertinent family history in first degree relatives        SOCIAL HISTORY:    [ ] Non-smoker  [ ] Smoker  [ ] Alcohol    Allergies    No Known Allergies    Intolerances    	    REVIEW OF SYSTEMS:  CONSTITUTIONAL: No fever, weight loss, or fatigue  EYES: No eye pain, visual disturbances, or discharge  ENMT:  No difficulty hearing, tinnitus, vertigo; No sinus or throat pain  NECK: No pain or stiffness  RESPIRATORY: No cough, wheezing, chills or hemoptysis; No Shortness of Breath  CARDIOVASCULAR: No chest pain, palpitations, passing out, dizziness, or leg swelling  GASTROINTESTINAL: No abdominal or epigastric pain. No nausea, vomiting, or hematemesis; No diarrhea or constipation. No melena or hematochezia.  GENITOURINARY: No dysuria, frequency, hematuria, or incontinence  NEUROLOGICAL: No headaches, memory loss, loss of strength, numbness, or tremors  SKIN: No itching, burning, rashes, or lesions   	    [ ] All others negative	  [ ] Unable to obtain    PHYSICAL EXAM:  T(C): 36.9 (11-09-22 @ 09:47), Max: 36.9 (11-09-22 @ 09:47)  HR: 81 (11-09-22 @ 09:47) (66 - 81)  BP: 94/56 (11-09-22 @ 09:47) (94/56 - 122/78)  RR: 18 (11-09-22 @ 09:48) (16 - 18)  SpO2: 93% (11-09-22 @ 09:48) (88% - 96%)  Wt(kg): --  I&O's Summary      Appearance: Normal	  Psychiatry: A & O x 3, Mood & affect appropriate  HEENT:   Normal oral mucosa, PERRL, EOMI	  Lymphatic: No lymphadenopathy  Cardiovascular: Normal S1 S2,RRR, No JVD, No murmurs  Respiratory: Lungs clear to auscultation	  Gastrointestinal:  Soft, Non-tender, + BS	  Skin: No rashes, No ecchymoses, No cyanosis	  Neurologic: Non-focal  Extremities: Normal range of motion, No clubbing, cyanosis or edema  Vascular: Peripheral pulses palpable 2+ bilaterally    TELEMETRY: 	    ECG:  	  RADIOLOGY:     VA Duplex Lower Ext Vein Scan, Bilat (11.09.22 @ 09:24) >  IMPRESSION:  No evidence of deep venous thrombosis in either lower extremity.        -----------------------------------------------------------------------------------------------------------------------------    TTE with Doppler (w/Cont) (11.09.22 @ 07:25) >  EF (Urban Rule): 73 %Doppler Peak Velocity (m/sec):  MV=1.4 AoV=2.5  ------------------------------------------------------------------------  Observations:  Mitral Valve: Mitral annular calcification and calcified  mitral leaflets. Mild mitral regurgitation.  Peak mitral  valve gradient equals 8 mm Hg, mean transmitral valve  gradient equals 2 mm Hg, consistent with mild mitral  stenosis.  Aortic Valve/Aorta: Bioprosthetic aortic valve. Peak  transaortic valve gradient equals 25 mm Hg, mean  transaortic valve gradient equals 15 mm Hg, which is  probably normal in the presence of a bioprosthetic aortic  valve. Mild aortic regurgitation.  Peak leftventricular  outflow tract gradient equals 7 mm Hg, mean gradient is  equal to 3 mm Hg, LVOT velocity time integral equals 27 cm.  Aortic Root: 3.5 cm.  Left Atrium: Moderately dilated left atrium.  LA volume  index = 46 cc/m2.  Left Ventricle: Normal left ventricular systolic function.  Normal left ventricular internal dimensions and wall  thicknesses. Indeterminate diastolic function.  Right Heart: Right atrial enlargement.   A device wire is  noted in the right heart. Right ventricular enlargement  with normal right ventricular systolic function. Normal  tricuspid valve. Mild tricuspid regurgitation. Normal  pulmonic valve. Minimal pulmonic regurgitation.  Pericardium/Pleura: Normal pericardium with no pericardial  effusion.  Hemodynamic: Estimated right atrial pressure is 8 mm Hg.  Estimated right ventricular systolic pressure equals 57 mm  Hg, assuming right atrial pressure equals 8 mm Hg,  consistent with moderate pulmonary hypertension.  ------------------------------------------------------------------------  Conclusions:  1. Bioprosthetic aortic valve. Peak transaortic valve  gradient equals 25 mm Hg, mean transaortic valve gradient  equals 15 mm Hg, which is probably normal in the presence  of a bioprosthetic aortic valve.  2. Normal left ventricular internal dimensions and wall  thicknesses.  3. Normal left ventricular systolic function.  4. Right ventricular enlargement with normal right  ventricular systolic function.  5. Normal tricuspid valve. Mild tricuspid regurgitation.  6. Estimated pulmonary artery systolic pressure equals 57  mm Hg, assuming right atrial pressure equals 8 mm Hg,  consistent with moderate pulmonary pressures.  *** No previous Echo exam.  ------------------------------------------------------------------------  Confirmed on  11/9/2022 - 09:56:24 by CARLEEN Escamilla  ------------------------------------------------------------------------    -----------------------------------------------------------------------------------------------------------------------------      CT Angio Chest PE Protocol w/ IV Cont (11.08.22 @ 09:01) >      INTERPRETATION:  INDICATION: Shortness of breath for 2 days    TECHNIQUE: Volumetric images of the chest were obtained after the   administration of 90 mL of Omnipaque 350. Maximum intensity projection   images were generated.    COMPARISON: CT chest 9/15/2015.    FINDINGS:    PULMONARY ANGIOGRAM:  Several subsegmental branches are not well assessed   due to respiratory motion. No pulmonary embolism from the main pulmonary   artery up to and including the segmental branches. Dilated pulmonary   artery, larger than the ascending aorta, also on prior.    LUNGS/AIRWAYS/PLEURA: No endobronchial lesion. Mild bilateral   interlobular septal thickening. No pleural effusion.    LYMPH NODES/MEDIASTINUM: No enlarged lymph nodes.    HEART/VASCULATURE: Enlarged heart. No pericardial effusion. Aortic valve   replacement. Appropriate course of dual-chamber pacemaker.    UPPER ABDOMEN: Cholelithiasis.    BONES/SOFT TISSUES: Sternotomy.      IMPRESSION:    No pulmonary embolism through the segmental branches. Several   subsegmental branches cannot be accurately evaluated.    Mild interstitial edema.    -----------------------------------------------------------------------------------------------------------------------------    OTHER: 	  	  LABS:	 	    CARDIAC MARKERS:  Troponin T, High Sensitivity Result: 23 ng/L (11-08 @ 11:02)  Troponin T, High Sensitivity Result: 25 ng/L (11-08 @ 05:51)                                  13.4   10.84 )-----------( 153      ( 09 Nov 2022 06:55 )             40.1     11-09    141  |  105  |  26<H>  ----------------------------<  93  4.3   |  25  |  0.92    Ca    9.2      09 Nov 2022 06:55  Mg     2.2     11-08    TPro  7.0  /  Alb  3.8  /  TBili  1.0  /  DBili  x   /  AST  58<H>  /  ALT  126<H>  /  AlkPhos  84  11-09    PT/INR - ( 08 Nov 2022 05:51 )   PT: 13.0 sec;   INR: 1.12 ratio         PTT - ( 08 Nov 2022 05:51 )  PTT:24.4 sec  proBNP:   Lipid Profile:   HgA1c:   TSH:        CARDIOLOGY CONSULT - Dr. Carrera         HPI:  66 y/o male with hx of DVT ( two sepearte occasions ) was on Eliquis ( last used 6 months ago ) , sp  AVR ( bovine ) ( repaired twice ?),  s/p PPM (medtronic), HTN, BPH,, admitted with 2 day hx of SOB . Pt reports BRAND worsen over the past several days, +orthopnea. Denies Le edema, cp, dizziness or palps. He is not on diuretics at home. Reports he sees his outpt cards Dr barfield - last visit was 3 -4 months ago - reported echo was normal. No recent stress test. no recent sickness .    ROS Otherwise negative    last stress test per chart review was in 2015- normal           PAST MEDICAL & SURGICAL HISTORY:  Hypertension      History of BPH      Cardiac murmur      Malignant melanoma  left back      Cardiac pacemaker  05/2013      History of heart valve replacement  2005, 2013, porcine      S/P abdominal aortic aneurysm repair  2013              PREVIOUS DIAGNOSTIC TESTING:    Nuclear Stress Test-Pharmacologic (06.03.15 @ 12:15) >  ------------------------------------------------------------------------  IMPRESSIONS:Normal Study  * Chest Pain: No chest pain with administration of  Regadenoson.  * Symptom: Shortness of breath.  * HR Response: Appropriate.  * BP Response: Appropriate.  * Heart Rhythm: Sinus Rhythm - 66 BPM.  * Baseline ECG: Nonspecific ST-T wave abnormality.  * ECG Changes: No ischemic ST segment changes.  * Arrhythmia: None.  * Review of raw data shows: Minor motion artifact.  * The left ventricle was enlarged. There is a small, mild  defect in the apex that is mostly fixed and demonstrates  preserved wall motion suggestive of attenuation artifact.  * Post-stress gated wall motion analysis was performed  (LVEF = 62 %;LVEDV = 136 ml.) revealing paradoxical septal  motion consistent with a post-operative state.  ------------------------------------------------------------------------        MEDICATIONS:  Home Medications:  finasteride 5 mg oral tablet: 1 tab(s) orally once a day (08 Nov 2022 11:36)  metoprolol succinate 100 mg oral tablet, extended release: 1 tab(s) orally once a day (08 Nov 2022 11:36)  tamsulosin 0.4 mg oral capsule: 1 cap(s) orally 2 times a day (08 Nov 2022 11:36)  Vitamin C 1000 mg oral tablet: 1 tab(s) orally once a day (08 Nov 2022 11:36)  Vitamin D3 1000 intl units oral tablet: 1 tab(s) orally once a day (08 Nov 2022 11:36)      MEDICATIONS  (STANDING):  furosemide   Injectable 40 milliGRAM(s) IV Push daily      FAMILY HISTORY:  No pertinent family history in first degree relatives        SOCIAL HISTORY:    [ x] Non-smoker  [ ] Smoker  [ ] Alcohol    Allergies    No Known Allergies    Intolerances    	    REVIEW OF SYSTEMS:  CONSTITUTIONAL: No fever, weight loss, or fatigue  EYES: No eye pain, visual disturbances, or discharge  ENMT:  No difficulty hearing, tinnitus, vertigo; No sinus or throat pain  NECK: No pain or stiffness  RESPIRATORY: No cough, wheezing, chills or hemoptysis; ++ Shortness of Breath  CARDIOVASCULAR: No chest pain, palpitations, passing out, dizziness, or leg swelling  GASTROINTESTINAL: No abdominal or epigastric pain. No nausea, vomiting, or hematemesis; No diarrhea or constipation. No melena or hematochezia.  GENITOURINARY: No dysuria, frequency, hematuria, or incontinence  NEUROLOGICAL: No headaches, memory loss, loss of strength, numbness, or tremors  SKIN: No itching, burning, rashes, or lesions   	    [x ] All others negative	  [ ] Unable to obtain    PHYSICAL EXAM:  T(C): 36.9 (11-09-22 @ 09:47), Max: 36.9 (11-09-22 @ 09:47)  HR: 81 (11-09-22 @ 09:47) (66 - 81)  BP: 94/56 (11-09-22 @ 09:47) (94/56 - 122/78)  RR: 18 (11-09-22 @ 09:48) (16 - 18)  SpO2: 93% (11-09-22 @ 09:48) (88% - 96%)  Wt(kg): --  I&O's Summary      Appearance: Normal	  Psychiatry: A & O x 3, Mood & affect appropriate  HEENT:   Normal oral mucosa, PERRL, EOMI	  Lymphatic: No lymphadenopathy  Cardiovascular: Normal S1 S2,RRR, +murmur   Respiratory:  diminished   Gastrointestinal:  Soft, Non-tender, + BS	  Skin: No rashes, No ecchymoses, No cyanosis	  Neurologic: Non-focal  Extremities: Normal range of motion, No clubbing, cyanosis or edema  Vascular: Peripheral pulses palpable 2+ bilaterally    TELEMETRY: 	    ECG:  	PACED hr 75 bpm   RADIOLOGY:     VA Duplex Lower Ext Vein Scan, Bilat (11.09.22 @ 09:24) >  IMPRESSION:  No evidence of deep venous thrombosis in either lower extremity.        -----------------------------------------------------------------------------------------------------------------------------    TTE with Doppler (w/Cont) (11.09.22 @ 07:25) >  EF (Urban Rule): 73 %Doppler Peak Velocity (m/sec):  MV=1.4 AoV=2.5  ------------------------------------------------------------------------  Observations:  Mitral Valve: Mitral annular calcification and calcified  mitral leaflets. Mild mitral regurgitation.  Peak mitral  valve gradient equals 8 mm Hg, mean transmitral valve  gradient equals 2 mm Hg, consistent with mild mitral  stenosis.  Aortic Valve/Aorta: Bioprosthetic aortic valve. Peak  transaortic valve gradient equals 25 mm Hg, mean  transaortic valve gradient equals 15 mm Hg, which is  probably normal in the presence of a bioprosthetic aortic  valve. Mild aortic regurgitation.  Peak leftventricular  outflow tract gradient equals 7 mm Hg, mean gradient is  equal to 3 mm Hg, LVOT velocity time integral equals 27 cm.  Aortic Root: 3.5 cm.  Left Atrium: Moderately dilated left atrium.  LA volume  index = 46 cc/m2.  Left Ventricle: Normal left ventricular systolic function.  Normal left ventricular internal dimensions and wall  thicknesses. Indeterminate diastolic function.  Right Heart: Right atrial enlargement.   A device wire is  noted in the right heart. Right ventricular enlargement  with normal right ventricular systolic function. Normal  tricuspid valve. Mild tricuspid regurgitation. Normal  pulmonic valve. Minimal pulmonic regurgitation.  Pericardium/Pleura: Normal pericardium with no pericardial  effusion.  Hemodynamic: Estimated right atrial pressure is 8 mm Hg.  Estimated right ventricular systolic pressure equals 57 mm  Hg, assuming right atrial pressure equals 8 mm Hg,  consistent with moderate pulmonary hypertension.  ------------------------------------------------------------------------  Conclusions:  1. Bioprosthetic aortic valve. Peak transaortic valve  gradient equals 25 mm Hg, mean transaortic valve gradient  equals 15 mm Hg, which is probably normal in the presence  of a bioprosthetic aortic valve.  2. Normal left ventricular internal dimensions and wall  thicknesses.  3. Normal left ventricular systolic function.  4. Right ventricular enlargement with normal right  ventricular systolic function.  5. Normal tricuspid valve. Mild tricuspid regurgitation.  6. Estimated pulmonary artery systolic pressure equals 57  mm Hg, assuming right atrial pressure equals 8 mm Hg,  consistent with moderate pulmonary pressures.  *** No previous Echo exam.  ------------------------------------------------------------------------  Confirmed on  11/9/2022 - 09:56:24 by CARLEEN Escamilla  ------------------------------------------------------------------------    -----------------------------------------------------------------------------------------------------------------------------      CT Angio Chest PE Protocol w/ IV Cont (11.08.22 @ 09:01) >      INTERPRETATION:  INDICATION: Shortness of breath for 2 days    TECHNIQUE: Volumetric images of the chest were obtained after the   administration of 90 mL of Omnipaque 350. Maximum intensity projection   images were generated.    COMPARISON: CT chest 9/15/2015.    FINDINGS:    PULMONARY ANGIOGRAM:  Several subsegmental branches are not well assessed   due to respiratory motion. No pulmonary embolism from the main pulmonary   artery up to and including the segmental branches. Dilated pulmonary   artery, larger than the ascending aorta, also on prior.    LUNGS/AIRWAYS/PLEURA: No endobronchial lesion. Mild bilateral   interlobular septal thickening. No pleural effusion.    LYMPH NODES/MEDIASTINUM: No enlarged lymph nodes.    HEART/VASCULATURE: Enlarged heart. No pericardial effusion. Aortic valve   replacement. Appropriate course of dual-chamber pacemaker.    UPPER ABDOMEN: Cholelithiasis.    BONES/SOFT TISSUES: Sternotomy.      IMPRESSION:    No pulmonary embolism through the segmental branches. Several   subsegmental branches cannot be accurately evaluated.    Mild interstitial edema.    -----------------------------------------------------------------------------------------------------------------------------    OTHER: 	  	  LABS:	 	    CARDIAC MARKERS:  Troponin T, High Sensitivity Result: 23 ng/L (11-08 @ 11:02)  Troponin T, High Sensitivity Result: 25 ng/L (11-08 @ 05:51)                                  13.4   10.84 )-----------( 153      ( 09 Nov 2022 06:55 )             40.1     11-09    141  |  105  |  26<H>  ----------------------------<  93  4.3   |  25  |  0.92    Ca    9.2      09 Nov 2022 06:55  Mg     2.2     11-08    TPro  7.0  /  Alb  3.8  /  TBili  1.0  /  DBili  x   /  AST  58<H>  /  ALT  126<H>  /  AlkPhos  84  11-09    PT/INR - ( 08 Nov 2022 05:51 )   PT: 13.0 sec;   INR: 1.12 ratio         PTT - ( 08 Nov 2022 05:51 )  PTT:24.4 sec  proBNP:   Lipid Profile:   HgA1c:   TSH:

## 2022-11-09 NOTE — CONSULT NOTE ADULT - TIME BILLING
Patient seen and examined, agree with the above assessment and plan by ROLAND Calle.  66 y/o male with hx of DVT, formerly on Eliquis ( last used 6 months ago ) , sp  AVR ( bovine )   s/p PPM (medtronic), HTN, BPH,, admitted with 2 day hx of SOB   symptoms likely related to CHF  CTA of the chest w pulm edema  echo w normal LVEF and BioAVR function  cont iv lasix   Plan for stress testing   please call ep to interrogate PPM r/o arrhythmias in the setting of new chf    dvt ppx

## 2022-11-10 LAB
A1C WITH ESTIMATED AVERAGE GLUCOSE RESULT: 5.8 % — HIGH (ref 4–5.6)
CHOLEST SERPL-MCNC: 167 MG/DL — SIGNIFICANT CHANGE UP
ESTIMATED AVERAGE GLUCOSE: 120 MG/DL — HIGH (ref 68–114)
HCT VFR BLD CALC: 38.6 % — LOW (ref 39–50)
HDLC SERPL-MCNC: 52 MG/DL — SIGNIFICANT CHANGE UP
HGB BLD-MCNC: 12.9 G/DL — LOW (ref 13–17)
LIPID PNL WITH DIRECT LDL SERPL: 83 MG/DL — SIGNIFICANT CHANGE UP
MCHC RBC-ENTMCNC: 31.9 PG — SIGNIFICANT CHANGE UP (ref 27–34)
MCHC RBC-ENTMCNC: 33.4 GM/DL — SIGNIFICANT CHANGE UP (ref 32–36)
MCV RBC AUTO: 95.5 FL — SIGNIFICANT CHANGE UP (ref 80–100)
NON HDL CHOLESTEROL: 115 MG/DL — SIGNIFICANT CHANGE UP
NRBC # BLD: 0 /100 WBCS — SIGNIFICANT CHANGE UP (ref 0–0)
PLATELET # BLD AUTO: 176 K/UL — SIGNIFICANT CHANGE UP (ref 150–400)
RBC # BLD: 4.04 M/UL — LOW (ref 4.2–5.8)
RBC # FLD: 13.9 % — SIGNIFICANT CHANGE UP (ref 10.3–14.5)
TRIGL SERPL-MCNC: 160 MG/DL — HIGH
TSH SERPL-MCNC: 2.86 UIU/ML — SIGNIFICANT CHANGE UP (ref 0.27–4.2)
WBC # BLD: 9.49 K/UL — SIGNIFICANT CHANGE UP (ref 3.8–10.5)
WBC # FLD AUTO: 9.49 K/UL — SIGNIFICANT CHANGE UP (ref 3.8–10.5)

## 2022-11-10 PROCEDURE — 93280 PM DEVICE PROGR EVAL DUAL: CPT | Mod: 26

## 2022-11-10 PROCEDURE — 93926 LOWER EXTREMITY STUDY: CPT | Mod: 26,RT

## 2022-11-10 RX ORDER — CHLORHEXIDINE GLUCONATE 213 G/1000ML
1 SOLUTION TOPICAL
Refills: 0 | Status: DISCONTINUED | OUTPATIENT
Start: 2022-11-10 | End: 2022-11-17

## 2022-11-10 RX ORDER — FUROSEMIDE 40 MG
40 TABLET ORAL
Refills: 0 | Status: DISCONTINUED | OUTPATIENT
Start: 2022-11-10 | End: 2022-11-11

## 2022-11-10 RX ORDER — ENOXAPARIN SODIUM 100 MG/ML
100 INJECTION SUBCUTANEOUS ONCE
Refills: 0 | Status: DISCONTINUED | OUTPATIENT
Start: 2022-11-10 | End: 2022-11-11

## 2022-11-10 RX ORDER — ACETAMINOPHEN 500 MG
650 TABLET ORAL ONCE
Refills: 0 | Status: COMPLETED | OUTPATIENT
Start: 2022-11-10 | End: 2022-11-10

## 2022-11-10 RX ORDER — APIXABAN 2.5 MG/1
5 TABLET, FILM COATED ORAL
Refills: 0 | Status: DISCONTINUED | OUTPATIENT
Start: 2022-11-10 | End: 2022-11-10

## 2022-11-10 RX ADMIN — FINASTERIDE 5 MILLIGRAM(S): 5 TABLET, FILM COATED ORAL at 11:39

## 2022-11-10 RX ADMIN — Medication 40 MILLIGRAM(S): at 05:36

## 2022-11-10 RX ADMIN — Medication 81 MILLIGRAM(S): at 11:38

## 2022-11-10 RX ADMIN — ATORVASTATIN CALCIUM 20 MILLIGRAM(S): 80 TABLET, FILM COATED ORAL at 22:14

## 2022-11-10 RX ADMIN — Medication 650 MILLIGRAM(S): at 17:44

## 2022-11-10 RX ADMIN — TAMSULOSIN HYDROCHLORIDE 0.4 MILLIGRAM(S): 0.4 CAPSULE ORAL at 22:14

## 2022-11-10 RX ADMIN — Medication 40 MILLIGRAM(S): at 18:05

## 2022-11-10 RX ADMIN — Medication 100 MILLIGRAM(S): at 05:37

## 2022-11-10 NOTE — CONSULT NOTE ADULT - SUBJECTIVE AND OBJECTIVE BOX
HPI:        PAST MEDICAL & SURGICAL HISTORY:  Hypertension      History of BPH      Cardiac murmur      Malignant melanoma  left back      Cardiac pacemaker  05/2013      History of heart valve replacement  2005, 2013, porcine      S/P abdominal aortic aneurysm repair  2013          MEDICATIONS  (STANDING):  aspirin enteric coated 81 milliGRAM(s) Oral daily  atorvastatin 20 milliGRAM(s) Oral at bedtime  chlorhexidine 2% Cloths 1 Application(s) Topical <User Schedule>  enoxaparin Injectable 100 milliGRAM(s) SubCutaneous once  finasteride 5 milliGRAM(s) Oral daily  furosemide   Injectable 40 milliGRAM(s) IV Push two times a day  metoprolol succinate  milliGRAM(s) Oral daily  tamsulosin 0.4 milliGRAM(s) Oral at bedtime    MEDICATIONS  (PRN):      Allergies    No Known Allergies    Intolerances        SOCIAL HISTORY:    FAMILY HISTORY:  No pertinent family history in first degree relatives            Physical Exam:  General: NAD, resting comfortably  HEENT: NC/AT, EOMI, normal hearing, no oral lesions, no LAD, neck supple  Pulmonary: normal resp effort, CTA-B  Cardiovascular: NSR, no murmurs  Abdominal: soft, ND/NT, no organomegaly  Extremities: WWP, normal strength, no clubbing/cyanosis/edema  Neuro: A/O x 3, CNs II-XII grossly intact, normal sensation, no focal deficits  Pulses: palpable distal pulses    Vital Signs Last 24 Hrs  T(C): 36.8 (10 Nov 2022 21:49), Max: 37.2 (10 Nov 2022 18:30)  T(F): 98.3 (10 Nov 2022 21:49), Max: 99 (10 Nov 2022 18:30)  HR: 74 (10 Nov 2022 21:49) (69 - 87)  BP: 105/63 (10 Nov 2022 21:49) (101/55 - 131/71)  BP(mean): --  RR: 20 (10 Nov 2022 21:49) (16 - 20)  SpO2: 94% (10 Nov 2022 21:49) (91% - 98%)    Parameters below as of 10 Nov 2022 21:49  Patient On (Oxygen Delivery Method): nasal cannula  O2 Flow (L/min): 3      I&O's Summary    10 Nov 2022 07:01  -  10 Nov 2022 22:50  --------------------------------------------------------  IN: 0 mL / OUT: 500 mL / NET: -500 mL            LABS:                        13.4   10.84 )-----------( 153      ( 09 Nov 2022 06:55 )             40.1     11-09    141  |  105  |  26<H>  ----------------------------<  93  4.3   |  25  |  0.92    Ca    9.2      09 Nov 2022 06:55    TPro  7.0  /  Alb  3.8  /  TBili  1.0  /  DBili  x   /  AST  58<H>  /  ALT  126<H>  /  AlkPhos  84  11-09        CAPILLARY BLOOD GLUCOSE        LIVER FUNCTIONS - ( 09 Nov 2022 06:55 )  Alb: 3.8 g/dL / Pro: 7.0 g/dL / ALK PHOS: 84 U/L / ALT: 126 U/L / AST: 58 U/L / GGT: x             Cultures:      RADIOLOGY & ADDITIONAL STUDIES:      Plan:           HPI:  67M hx DVT on eliquis, AVR x2, ascending aortic aneurysm repair, s/p PPM, HTN, BPH, admitted for SOB, found to have acute onset new HF with preserved EF, with abnormal stress test with ischemia. Pt. now s/p right and left heart cath today via R radial artery and R femoral vein. Found to have pulsatile R groin on exam, duplex showing concern for PSA with AVF. Vascular surgery consulted for management.       PAST MEDICAL & SURGICAL HISTORY:  Hypertension      History of BPH      Cardiac murmur      Malignant melanoma  left back      Cardiac pacemaker  05/2013      History of heart valve replacement  2005, 2013, porcine      S/P abdominal aortic aneurysm repair  2013          MEDICATIONS  (STANDING):  aspirin enteric coated 81 milliGRAM(s) Oral daily  atorvastatin 20 milliGRAM(s) Oral at bedtime  chlorhexidine 2% Cloths 1 Application(s) Topical <User Schedule>  enoxaparin Injectable 100 milliGRAM(s) SubCutaneous once  finasteride 5 milliGRAM(s) Oral daily  furosemide   Injectable 40 milliGRAM(s) IV Push two times a day  metoprolol succinate  milliGRAM(s) Oral daily  tamsulosin 0.4 milliGRAM(s) Oral at bedtime    MEDICATIONS  (PRN):      Allergies    No Known Allergies    Intolerances        SOCIAL HISTORY:    FAMILY HISTORY:  No pertinent family history in first degree relatives            Physical Exam:  General: NAD, resting comfortably  HEENT: NC/AT, EOMI, normal hearing, no oral lesions, no LAD, neck supple  Pulmonary: normal resp effort, CTA-B  Cardiovascular: NSR, no murmurs  Abdominal: soft, ND/NT, no organomegaly  Extremities: WWP, normal strength, no clubbing/cyanosis/edema. Palpable DP/PT bilaterally. R groin with pulsation.   Neuro: A/O x 3, CNs II-XII grossly intact, normal sensation, no focal deficits      Vital Signs Last 24 Hrs  T(C): 36.8 (10 Nov 2022 21:49), Max: 37.2 (10 Nov 2022 18:30)  T(F): 98.3 (10 Nov 2022 21:49), Max: 99 (10 Nov 2022 18:30)  HR: 74 (10 Nov 2022 21:49) (69 - 87)  BP: 105/63 (10 Nov 2022 21:49) (101/55 - 131/71)  BP(mean): --  RR: 20 (10 Nov 2022 21:49) (16 - 20)  SpO2: 94% (10 Nov 2022 21:49) (91% - 98%)    Parameters below as of 10 Nov 2022 21:49  Patient On (Oxygen Delivery Method): nasal cannula  O2 Flow (L/min): 3      I&O's Summary    10 Nov 2022 07:01  -  10 Nov 2022 22:50  --------------------------------------------------------  IN: 0 mL / OUT: 500 mL / NET: -500 mL            LABS:                        13.4   10.84 )-----------( 153      ( 09 Nov 2022 06:55 )             40.1     11-09    141  |  105  |  26<H>  ----------------------------<  93  4.3   |  25  |  0.92    Ca    9.2      09 Nov 2022 06:55    TPro  7.0  /  Alb  3.8  /  TBili  1.0  /  DBili  x   /  AST  58<H>  /  ALT  126<H>  /  AlkPhos  84  11-09        CAPILLARY BLOOD GLUCOSE        LIVER FUNCTIONS - ( 09 Nov 2022 06:55 )  Alb: 3.8 g/dL / Pro: 7.0 g/dL / ALK PHOS: 84 U/L / ALT: 126 U/L / AST: 58 U/L / GGT: x             Cultures:      RADIOLOGY & ADDITIONAL STUDIES:  < from: US Duplex Arterial Lower Ext Ltd, Right (11.10.22 @ 21:08) >  indings concerning for   pseudoaneurysm. howver, not typical waveform. can consider avf as   well..discussed with provider    < end of copied text >

## 2022-11-10 NOTE — PROGRESS NOTE ADULT - ASSESSMENT
68 y/o male with hx of DVT ( two sepearte occasions ) was on eleiquis ( last used 6 months ago ) , AVR ( bovine ) ( repaired twice ?),  s/p PPM HTN, BPH, pacemaker, admitted with 2 day hx of SOB , exertional and worsened last night . NO cp   no fever or chills .  no N/V /cough   mild LE swelling   CT: No pulmonary embolism through the segmental branches. Several   subsegmental branches cannot be accurately evaluated.    Mild interstitial edema.  no  acute changes on EKG  trop neg     acute onset dyspnea :? sec to acute onset of CHF   Echo :NL  tele   cardio input noted ..stress abn ..cath  :  no significant CAD   elevated wedge   improved with lasix ... cont same   add asa, lipitor     chronic R femoral pulsating thrill: consult vasc and check US       eleated LFT : ? sec to congestion   monitor     afib: on BB   eliquis started

## 2022-11-10 NOTE — CONSULT NOTE ADULT - NSCONSULTADDITIONALINFOA_GEN_ALL_CORE
FELLOW ATTESTATION:    s/p cath with R groin access with R CFA and CFV AVF with associated 2.5cm PSA.    Currently asymptomatic without LE swelling, significant pain, or overlying ecchymosis.  HD stable. R groin with palpable PSA and palpable pedal pulses.  30 min manual compression applied.    Recommend repeat R groin duplex     Discussed with attending, Dr. Larsen

## 2022-11-10 NOTE — PROCEDURE NOTE - ADDITIONAL PROCEDURE DETAILS
Indication for interrogation: Assess for arrhythmias  Presenting rhythm: SR  Measured data WNL, normal pacemaker function, Pt is NOT pacemaker dependent  Stored data revealed 3 episodes of AF lasting up to 13 hours, AF burden 0.4% as well as brief NSVT  Findings discussed with team

## 2022-11-10 NOTE — PROGRESS NOTE ADULT - ASSESSMENT
ECHO 11/9/22:  Ef 73%: mild MS, fx bioprosthe avr, nl LV sys fx   mod pulm pressure   Stress test 11/9/22: abnl with evidence of infarct and shilo-infarct ischemia, LVEF 59%;   revealing hypokinesis of the basal inferior and basal septal walls and reduced systolic thickening of the basal to mid inferolateral, distal anterior, distal anteroseptal, and apical walls.   Nuclear Stress Test-Pharmacologic (06.03.15 @ 12:15) >  ------------------------------------------------------------------------  IMPRESSIONS:Normal Study  * Baseline ECG: Nonspecific ST-T wave abnormality.  * ECG Changes: No ischemic ST segment changes.  * Arrhythmia: None.  * Review of raw data shows: Minor motion artifact.  * The left ventricle was enlarged. There is a small, mild  defect in the apex that is mostly fixed and demonstrates  preserved wall motion suggestive of attenuation artifact.  * Post-stress gated wall motion analysis was performed  (LVEF = 62 %;LVEDV = 136 ml.) revealing paradoxical septal  motion consistent with a post-operative state.  ------------------------------------------------------------------------      a/p     66 y/o male with hx of DVT ( two sepearte occasions ) was on Eliquis ( last used 6 months ago ) , sp  AVR ( bovine ) ( repaired twice ?),  s/p PPM (medtronic), HTN, BPH,, admitted with 2 day hx of SOB     #acute on chronic DCHF   -cta chest No pulmonary embolism through the segmental branches. Several  subsegmental branches cannot be accurately evaluated. Mild interstitial edema.  -HS trop neg. no ischemic changes to ecg   -no recent ischemic eval   -echo with nl LV sys fx, fx bio avr   -stress test abnl with evidence of infarct and shilo-infarct ischemia, LVEF 59%;   revealing hypokinesis of the basal inferior and basal septal walls and reduced systolic thickening of the basal to mid inferolateral, distal anterior, distal anteroseptal, and apical walls  -Plan for cardiac cath today , r/b/a d/w pt- pt can proceed from a cv standpoint   -volume status stable.; hypotensive at times --  can change lasix to 20 mg PO daily    -c/w BB     #Hx dvt (off AC)  -le doppler neg for dvt     #SP AVR   -stable on echo     #Sp PPM - Medtronic   -please call ep to interrogate PPM r/o arrhythmias in the setting of new chf    dvt ppx

## 2022-11-10 NOTE — PHARMACOTHERAPY INTERVENTION NOTE - COMMENTS
Performed medication reconciliation and home medication list updated in outpatient medication review. Medications verified with patient. Please refer to specifics in home medication list (outpatient medication review).    Home Medications:  finasteride 5 mg oral tablet: 1 tab(s) orally once a day   metoprolol succinate 100 mg oral tablet, extended release: 1 tab(s) orally once a day  tamsulosin 0.4 mg oral capsule: 1 cap(s) orally 2 times a day   Vitamin C 1000 mg oral tablet: 1 tab(s) orally once a day   Vitamin D3 1000 intl units oral tablet: 1 tab(s) orally once a day    All medications confirmed with patient at bedside.    Cassius Ceballos  PharmD. Candidate 2023       Performed medication reconciliation and home medication list updated in outpatient medication review. Medications verified with patient. Please refer to specifics in home medication list (outpatient medication review).    Home Medications:  finasteride 5 mg oral tablet: 1 tab(s) orally once a day   metoprolol succinate 100 mg oral tablet, extended release: 1 tab(s) orally once a day  tamsulosin 0.4 mg oral capsule: 1 cap(s) orally 2 times a day   Vitamin C 1000 mg oral tablet: 1 tab(s) orally once a day   Vitamin D3 1000 intl units oral tablet: 1 tab(s) orally once a day    All medications confirmed with patient at bedside. All home meds reconciled.    Cassius Ceballos  PharmD. Candidate 2023    Deysi Beltrán, PharmD, [facility]  Clinical Pharmacy Specialist  [phone] or Teams

## 2022-11-10 NOTE — CHART NOTE - NSCHARTNOTEFT_GEN_A_CORE
Interval Hx: Patient with PPM interrogation today showing stored data that revealed 3 episodes of AF lasting up to 13 hours, AF burden 0.4% as well as brief NSVT.    Patient not on full anticoagulation at this time.  Discussed above w/ derek Mayers.  Will start patient on Eliquis 5mg BID.  Will continue to closely follow and assess.    -Aminata Evans PA-C, [id number], Dept of Medicine

## 2022-11-11 LAB
ANION GAP SERPL CALC-SCNC: 14 MMOL/L — SIGNIFICANT CHANGE UP (ref 5–17)
APTT BLD: 25.3 SEC — LOW (ref 27.5–35.5)
APTT BLD: 43.7 SEC — HIGH (ref 27.5–35.5)
APTT BLD: 95.3 SEC — HIGH (ref 27.5–35.5)
BLD GP AB SCN SERPL QL: NEGATIVE — SIGNIFICANT CHANGE UP
BUN SERPL-MCNC: 38 MG/DL — HIGH (ref 7–23)
CALCIUM SERPL-MCNC: 9.3 MG/DL — SIGNIFICANT CHANGE UP (ref 8.4–10.5)
CHLORIDE SERPL-SCNC: 102 MMOL/L — SIGNIFICANT CHANGE UP (ref 96–108)
CK MB CFR SERPL CALC: 2 NG/ML — SIGNIFICANT CHANGE UP (ref 0–6.7)
CK SERPL-CCNC: 83 U/L — SIGNIFICANT CHANGE UP (ref 30–200)
CO2 SERPL-SCNC: 26 MMOL/L — SIGNIFICANT CHANGE UP (ref 22–31)
CREAT SERPL-MCNC: 1.21 MG/DL — SIGNIFICANT CHANGE UP (ref 0.5–1.3)
EGFR: 65 ML/MIN/1.73M2 — SIGNIFICANT CHANGE UP
GLUCOSE SERPL-MCNC: 165 MG/DL — HIGH (ref 70–99)
HCT VFR BLD CALC: 41.4 % — SIGNIFICANT CHANGE UP (ref 39–50)
HGB BLD-MCNC: 13.8 G/DL — SIGNIFICANT CHANGE UP (ref 13–17)
MAGNESIUM SERPL-MCNC: 2 MG/DL — SIGNIFICANT CHANGE UP (ref 1.6–2.6)
MCHC RBC-ENTMCNC: 31.7 PG — SIGNIFICANT CHANGE UP (ref 27–34)
MCHC RBC-ENTMCNC: 33.3 GM/DL — SIGNIFICANT CHANGE UP (ref 32–36)
MCV RBC AUTO: 95.2 FL — SIGNIFICANT CHANGE UP (ref 80–100)
MRSA PCR RESULT.: SIGNIFICANT CHANGE UP
NRBC # BLD: 0 /100 WBCS — SIGNIFICANT CHANGE UP (ref 0–0)
PHOSPHATE SERPL-MCNC: 4 MG/DL — SIGNIFICANT CHANGE UP (ref 2.5–4.5)
PLATELET # BLD AUTO: 200 K/UL — SIGNIFICANT CHANGE UP (ref 150–400)
POTASSIUM SERPL-MCNC: 3.9 MMOL/L — SIGNIFICANT CHANGE UP (ref 3.5–5.3)
POTASSIUM SERPL-SCNC: 3.9 MMOL/L — SIGNIFICANT CHANGE UP (ref 3.5–5.3)
RBC # BLD: 4.35 M/UL — SIGNIFICANT CHANGE UP (ref 4.2–5.8)
RBC # FLD: 13.8 % — SIGNIFICANT CHANGE UP (ref 10.3–14.5)
RH IG SCN BLD-IMP: NEGATIVE — SIGNIFICANT CHANGE UP
S AUREUS DNA NOSE QL NAA+PROBE: SIGNIFICANT CHANGE UP
SODIUM SERPL-SCNC: 142 MMOL/L — SIGNIFICANT CHANGE UP (ref 135–145)
TROPONIN T, HIGH SENSITIVITY RESULT: 60 NG/L — HIGH (ref 0–51)
WBC # BLD: 9.98 K/UL — SIGNIFICANT CHANGE UP (ref 3.8–10.5)
WBC # FLD AUTO: 9.98 K/UL — SIGNIFICANT CHANGE UP (ref 3.8–10.5)

## 2022-11-11 PROCEDURE — 93010 ELECTROCARDIOGRAM REPORT: CPT

## 2022-11-11 PROCEDURE — 99223 1ST HOSP IP/OBS HIGH 75: CPT

## 2022-11-11 PROCEDURE — 93926 LOWER EXTREMITY STUDY: CPT | Mod: 26,RT

## 2022-11-11 RX ORDER — FUROSEMIDE 40 MG
40 TABLET ORAL
Refills: 0 | Status: DISCONTINUED | OUTPATIENT
Start: 2022-11-11 | End: 2022-11-12

## 2022-11-11 RX ORDER — HEPARIN SODIUM 5000 [USP'U]/ML
4000 INJECTION INTRAVENOUS; SUBCUTANEOUS EVERY 6 HOURS
Refills: 0 | Status: DISCONTINUED | OUTPATIENT
Start: 2022-11-11 | End: 2022-11-12

## 2022-11-11 RX ORDER — METOPROLOL TARTRATE 50 MG
50 TABLET ORAL DAILY
Refills: 0 | Status: DISCONTINUED | OUTPATIENT
Start: 2022-11-12 | End: 2022-11-17

## 2022-11-11 RX ORDER — SODIUM CHLORIDE 9 MG/ML
500 INJECTION INTRAMUSCULAR; INTRAVENOUS; SUBCUTANEOUS ONCE
Refills: 0 | Status: COMPLETED | OUTPATIENT
Start: 2022-11-11 | End: 2022-11-11

## 2022-11-11 RX ORDER — FUROSEMIDE 40 MG
20 TABLET ORAL DAILY
Refills: 0 | Status: DISCONTINUED | OUTPATIENT
Start: 2022-11-11 | End: 2022-11-11

## 2022-11-11 RX ORDER — HEPARIN SODIUM 5000 [USP'U]/ML
8500 INJECTION INTRAVENOUS; SUBCUTANEOUS EVERY 6 HOURS
Refills: 0 | Status: DISCONTINUED | OUTPATIENT
Start: 2022-11-11 | End: 2022-11-12

## 2022-11-11 RX ORDER — HEPARIN SODIUM 5000 [USP'U]/ML
INJECTION INTRAVENOUS; SUBCUTANEOUS
Qty: 25000 | Refills: 0 | Status: DISCONTINUED | OUTPATIENT
Start: 2022-11-11 | End: 2022-11-12

## 2022-11-11 RX ADMIN — TAMSULOSIN HYDROCHLORIDE 0.4 MILLIGRAM(S): 0.4 CAPSULE ORAL at 21:26

## 2022-11-11 RX ADMIN — FINASTERIDE 5 MILLIGRAM(S): 5 TABLET, FILM COATED ORAL at 16:22

## 2022-11-11 RX ADMIN — HEPARIN SODIUM 4000 UNIT(S): 5000 INJECTION INTRAVENOUS; SUBCUTANEOUS at 11:58

## 2022-11-11 RX ADMIN — Medication 40 MILLIGRAM(S): at 05:32

## 2022-11-11 RX ADMIN — Medication 40 MILLIGRAM(S): at 16:00

## 2022-11-11 RX ADMIN — HEPARIN SODIUM 2000 UNIT(S)/HR: 5000 INJECTION INTRAVENOUS; SUBCUTANEOUS at 11:46

## 2022-11-11 RX ADMIN — ATORVASTATIN CALCIUM 20 MILLIGRAM(S): 80 TABLET, FILM COATED ORAL at 21:25

## 2022-11-11 RX ADMIN — Medication 100 MILLIGRAM(S): at 05:32

## 2022-11-11 RX ADMIN — Medication 81 MILLIGRAM(S): at 16:22

## 2022-11-11 RX ADMIN — HEPARIN SODIUM 2000 UNIT(S)/HR: 5000 INJECTION INTRAVENOUS; SUBCUTANEOUS at 20:48

## 2022-11-11 RX ADMIN — HEPARIN SODIUM 1800 UNIT(S)/HR: 5000 INJECTION INTRAVENOUS; SUBCUTANEOUS at 01:45

## 2022-11-11 RX ADMIN — SODIUM CHLORIDE 1000 MILLILITER(S): 9 INJECTION INTRAMUSCULAR; INTRAVENOUS; SUBCUTANEOUS at 07:55

## 2022-11-11 RX ADMIN — CHLORHEXIDINE GLUCONATE 1 APPLICATION(S): 213 SOLUTION TOPICAL at 05:32

## 2022-11-11 NOTE — CHART NOTE - NSCHARTNOTEFT_GEN_A_CORE
Interval Hx: Informed by RN patient feels weak.     Patient seen and evaluated at bedside. Patient states that he woke up not long ago and was having trouble breathing. Of note, pt refused to wear his CPAP overnight. Patient states during interview his feeling of shortness of breath is improving. Saturating well on 3LNC, 95%. Not tachypneic and does not appear to currently be in respiratory distress. Heparin gtt was paused by RN for bleeding at IV site.     Possible transient respiratory distress episode in the setting of patient's ongoing CHF exacerbation. Currently on IV Lasix 40mg BID.  Possible component of overdiuresis causing patient's "weakness" symptom. Told RN he felt dizzy, but denies dizziness during my encounter.   Patient's systolic BP in 90s, slightly lower than pts baseline of systolic 100s-110s.  Will bolus 500cc normal saline and reassess.  EKG ordered, cardiac enzymes added on to AM labs.  Can likely resume heparin gtt, pending morning blood work and ensuring no drop in H/H. Of note patient had an episode of scant blood in sputum earlier tonight.  Day tea and attending to follow.    -Aminata Evans PA-C, [id number], Dept of Medicine

## 2022-11-11 NOTE — PROGRESS NOTE ADULT - ASSESSMENT
ECHO 11/9/22:  Ef 73%: mild MS, fx bioprosthe avr, nl LV sys fx   mod pulm pressure   Stress test 11/9/22: abnl with evidence of infarct and shilo-infarct ischemia, LVEF 59%;   revealing hypokinesis of the basal inferior and basal septal walls and reduced systolic thickening of the basal to mid inferolateral, distal anterior, distal anteroseptal, and apical walls.   Nuclear Stress Test-Pharmacologic (06.03.15 @ 12:15) >  ------------------------------------------------------------------------  IMPRESSIONS:Normal Study  * Baseline ECG: Nonspecific ST-T wave abnormality.  * ECG Changes: No ischemic ST segment changes.  * Arrhythmia: None.  * Review of raw data shows: Minor motion artifact.  * The left ventricle was enlarged. There is a small, mild  defect in the apex that is mostly fixed and demonstrates  preserved wall motion suggestive of attenuation artifact.  * Post-stress gated wall motion analysis was performed  (LVEF = 62 %;LVEDV = 136 ml.) revealing paradoxical septal  motion consistent with a post-operative state.  ------------------------------------------------------------------------      a/p     68 y/o male with hx of ascending aortic aneurysm repair, DVT ( two sepearte occasions ) was on Eliquis ( last used 6 months ago ) , sp  AVR ( bovine ) ( repaired twice ?),  s/p PPM (medtronic), HTN, BPH,, admitted with 2 day hx of SOB     #acute on chronic DCHF   -cta chest No pulmonary embolism through the segmental branches. Several  subsegmental branches cannot be accurately evaluated. Mild interstitial edema.  -HS trop neg. no ischemic changes to ecg   -no recent ischemic eval   -echo with nl LV sys fx, fx bio avr   -stress test abnl with evidence of infarct and shilo-infarct ischemia, LVEF 59%;   revealing hypokinesis of the basal inferior and basal septal walls and reduced systolic thickening of the basal to mid inferolateral, distal anterior, distal anteroseptal, and apical walls  -sp cath   with luminal LCx/Ramus disease.  Mild proximal LAD disease.  No obstructive epicardial disease to account for ischemia on SPECT.  Severely elevated PASP in settingof markedly elevated filling pressures/wedge pressure. Severely elevated PASP likely secondary to increased LA pressure.   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  -right common  femoral artery pseudoaneurysm chronic   -vascular eval noted No acute vascular surgery intervention; REPEAT DUPLEX TODAY    #New onset afib   -sp PPM interrogation noted: Measured data WNL, normal pacemaker function, Pt is NOT pacemaker dependent ; Stored data revealed 3 episodes of AF lasting up to 13 hours, AF burden 0.4% as well as brief NSVT  -will start ORAL AC pending repeat RLE imaging-- currently on hep gtt   -cw BB       #Hx dvt (off AC)  -le doppler neg for dvt     #SP AVR   -stable on echo     #Sp PPM - [employer]   -ppm interrogation as above     dvt ppx  ECHO 11/9/22:  Ef 73%: mild MS, fx bioprosthe avr, nl LV sys fx   mod pulm pressure   Stress test 11/9/22: abnl with evidence of infarct and shilo-infarct ischemia, LVEF 59%;   revealing hypokinesis of the basal inferior and basal septal walls and reduced systolic thickening of the basal to mid inferolateral, distal anterior, distal anteroseptal, and apical walls.   Nuclear Stress Test-Pharmacologic (06.03.15 @ 12:15) >  ------------------------------------------------------------------------  IMPRESSIONS:Normal Study  * Baseline ECG: Nonspecific ST-T wave abnormality.  * ECG Changes: No ischemic ST segment changes.  * Arrhythmia: None.  * Review of raw data shows: Minor motion artifact.  * The left ventricle was enlarged. There is a small, mild  defect in the apex that is mostly fixed and demonstrates  preserved wall motion suggestive of attenuation artifact.  * Post-stress gated wall motion analysis was performed  (LVEF = 62 %;LVEDV = 136 ml.) revealing paradoxical septal  motion consistent with a post-operative state.  ------------------------------------------------------------------------      a/p     66 y/o male with hx of ascending aortic aneurysm repair, DVT ( two sepearte occasions ) was on Eliquis ( last used 6 months ago ) , sp  AVR ( bovine ) ( repaired twice ?),  s/p PPM (medtronic), HTN, BPH,, admitted with 2 day hx of SOB     #acute on chronic DCHF   -cta chest No pulmonary embolism through the segmental branches. Several  subsegmental branches cannot be accurately evaluated. Mild interstitial edema.  -HS trop neg. no ischemic changes to ecg   -no recent ischemic eval   -echo with nl LV sys fx, fx bio avr   -stress test abnl with evidence of infarct and shilo-infarct ischemia, LVEF 59%;   revealing hypokinesis of the basal inferior and basal septal walls and reduced systolic thickening of the basal to mid inferolateral, distal anterior, distal anteroseptal, and apical walls  -sp cath   with luminal LCx/Ramus disease.  Mild proximal LAD disease.  No obstructive epicardial disease to account for ischemia on SPECT.  Severely elevated PASP in settingof markedly elevated filling pressures/wedge pressure. Severely elevated PASP likely secondary to increased LA pressure.   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  -right common  femoral artery pseudoaneurysm chronic   -vascular eval noted No acute vascular surgery intervention; REPEAT DUPLEX TODAY    #New onset afib   -sp PPM interrogation noted: Measured data WNL, normal pacemaker function, Pt is NOT pacemaker dependent ; Stored data revealed 3 episodes of AF lasting up to 13 hours, AF burden 0.4% as well as brief NSVT  -will start ORAL AC pending repeat RLE imaging-- currently on hep gtt   -cw BB       #Hx dvt (off AC)  -le doppler neg for dvt     #SP AVR   -stable on echo     #Sp PPM - Medtronic   -ppm interrogation as above     dvt ppx  ECHO 11/9/22:  Ef 73%: mild MS, fx bioprosthe avr, nl LV sys fx   mod pulm pressure   Stress test 11/9/22: abnl with evidence of infarct and shilo-infarct ischemia, LVEF 59%;   revealing hypokinesis of the basal inferior and basal septal walls and reduced systolic thickening of the basal to mid inferolateral, distal anterior, distal anteroseptal, and apical walls.   Nuclear Stress Test-Pharmacologic (06.03.15 @ 12:15) >  ------------------------------------------------------------------------  IMPRESSIONS:Normal Study  * Baseline ECG: Nonspecific ST-T wave abnormality.  * ECG Changes: No ischemic ST segment changes.  * Arrhythmia: None.  * Review of raw data shows: Minor motion artifact.  * The left ventricle was enlarged. There is a small, mild  defect in the apex that is mostly fixed and demonstrates  preserved wall motion suggestive of attenuation artifact.  * Post-stress gated wall motion analysis was performed  (LVEF = 62 %;LVEDV = 136 ml.) revealing paradoxical septal  motion consistent with a post-operative state.  ------------------------------------------------------------------------      a/p     68 y/o male with hx of ascending aortic aneurysm repair, DVT ( two sepearte occasions ) was on Eliquis ( last used 6 months ago ) , sp  AVR ( bovine ) ( repaired twice ?),  s/p PPM (medtronic), HTN, BPH,, admitted with 2 day hx of SOB     #acute on chronic DCHF   -cta chest No pulmonary embolism through the segmental branches. Several  subsegmental branches cannot be accurately evaluated. Mild interstitial edema.  -HS trop neg. no ischemic changes to ecg   -no recent ischemic eval   -echo with nl LV sys fx, fx bio avr   -stress test abnl with evidence of infarct and shilo-infarct ischemia, LVEF 59%;   revealing hypokinesis of the basal inferior and basal septal walls and reduced systolic thickening of the basal to mid inferolateral, distal anterior, distal anteroseptal, and apical walls  -sp cath   with luminal LCx/Ramus disease.  Mild proximal LAD disease.  No obstructive epicardial disease to account for ischemia on SPECT.  Severely elevated PASP in settingof markedly elevated filling pressures/wedge pressure. Severely elevated PASP likely secondary to increased LA pressure.   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  -right common  femoral artery pseudoaneurysm is chronic   -vascular eval noted No acute vascular surgery intervention; REPEAT DUPLEX TODAY    #New onset afib   -sp PPM interrogation noted: Measured data WNL, normal pacemaker function, Pt is NOT pacemaker dependent ; Stored data revealed 3 episodes of AF lasting up to 13 hours, AF burden 0.4% as well as brief NSVT  -will start ORAL AC pending repeat RLE imaging-- currently on hep gtt   -cw BB       #Hx dvt (off AC)  -le doppler neg for dvt     #SP AVR   -stable on echo     #Sp PPM - [employer]tronic   -ppm interrogation as above     dvt ppx

## 2022-11-11 NOTE — PROGRESS NOTE ADULT - ATTENDING COMMENTS
Right femoral access for right heart cath.   Duplex w/ femoral speudo w/ avf.   Not symptomatic at this time.     Will need CTA abdomen/pelvis w/ left leg runoff for evaluation.  Will need open repair early next week.   Please optimize.  Patient should be bedrest with right groin extended.

## 2022-11-11 NOTE — PROGRESS NOTE ADULT - ASSESSMENT
66 y/o male with hx of DVT ( two sepearte occasions ) was on eleiquis ( last used 6 months ago ) , AVR ( bovine ) ( repaired twice ?),  s/p PPM HTN, BPH, pacemaker, admitted with 2 day hx of SOB , exertional and worsened last night . NO cp   no fever or chills .  no N/V /cough   mild LE swelling   CT: No pulmonary embolism through the segmental branches. Several   subsegmental branches cannot be accurately evaluated.    Mild interstitial edema.  no  acute changes on EKG  trop neg     acute onset dyspnea :? sec to acute onset of CHF   Echo :NL  tele   cardio input noted ..stress abn ..cath  :  no significant CAD   elevated wedge   improved with lasix ... cont same   add asa, lipitor     chronic R femoral pulsating thrill: consult vasc and check US       eleated LFT : ? sec to congestion   monitor     afib: on BB   eliquis started              66 y/o male with hx of DVT ( two sepearte occasions ) was on eleiquis ( last used 6 months ago ) , AVR ( bovine ) ( repaired twice ?),  s/p PPM HTN, BPH, pacemaker, admitted with 2 day hx of SOB , exertional and worsened last night . NO cp   no fever or chills .  no N/V /cough   mild LE swelling   CT: No pulmonary embolism through the segmental branches. Several   subsegmental branches cannot be accurately evaluated.    Mild interstitial edema.  no  acute changes on EKG  trop neg     acute onset dyspnea :? sec to acute onset of CHF   Echo :NL  tele   cardio input noted ..stress abn ..cath  :  no significant CAD   elevated wedge   improved with lasix ... cont same   add asa, lipitor     chronic R femoral pulsating thrill:   Will need CTA abdomen/pelvis w/ left leg runoff for evaluation.  Will need open repair early next week.   Please optimize.  Patient should be bedrest with right groin extended.    eleated LFT : ? sec to congestion   monitor     afib: on BB   eliquis started

## 2022-11-11 NOTE — PROGRESS NOTE ADULT - ASSESSMENT
67M hx DVT on eliquis, AVR x2, ascending aortic aneurysm repair, s/p PPM, HTN, BPH, admitted for SOB, found to have acute onset new HF with preserved EF, with abnormal stress test with ischemia. Pt. now s/p right and left heart cath via R radial artery and R femoral vein. Found to have pulsatile R groin on exam, duplex showing concern for PSA with AVF. Vascular surgery consulted for management.     RECOMMENDATIONS:    - No acute vascular surgery intervention  - F/u repeat duplex to assess pseudoaneurysm         Vascular Surgery  [id number] 67M hx DVT on eliquis, AVR x2, ascending aortic aneurysm repair, s/p PPM, HTN, BPH, admitted for SOB, found to have acute onset new HF with preserved EF, with abnormal stress test with ischemia. Pt. now s/p right and left heart cath via R radial artery and R femoral vein. Found to have pulsatile R groin on exam, duplex showing concern for PSA with AVF. Vascular surgery consulted for management.     RECOMMENDATIONS:    - No acute vascular surgery intervention  - Per cardiologist who did the cath, the pseudoaneurysm was present before the procedure. No acute intervention is indicated for this chronic PSA.   - Outpatient follow up with Dr. Pearson in 2 weeks        Vascular Surgery  [id number]

## 2022-11-12 LAB
ANION GAP SERPL CALC-SCNC: 11 MMOL/L — SIGNIFICANT CHANGE UP (ref 5–17)
ANION GAP SERPL CALC-SCNC: 12 MMOL/L — SIGNIFICANT CHANGE UP (ref 5–17)
APTT BLD: 114.7 SEC — HIGH (ref 27.5–35.5)
APTT BLD: 96.2 SEC — HIGH (ref 27.5–35.5)
BUN SERPL-MCNC: 35 MG/DL — HIGH (ref 7–23)
BUN SERPL-MCNC: 38 MG/DL — HIGH (ref 7–23)
CALCIUM SERPL-MCNC: 9.1 MG/DL — SIGNIFICANT CHANGE UP (ref 8.4–10.5)
CALCIUM SERPL-MCNC: 9.2 MG/DL — SIGNIFICANT CHANGE UP (ref 8.4–10.5)
CHLORIDE SERPL-SCNC: 101 MMOL/L — SIGNIFICANT CHANGE UP (ref 96–108)
CHLORIDE SERPL-SCNC: 104 MMOL/L — SIGNIFICANT CHANGE UP (ref 96–108)
CO2 SERPL-SCNC: 28 MMOL/L — SIGNIFICANT CHANGE UP (ref 22–31)
CO2 SERPL-SCNC: 28 MMOL/L — SIGNIFICANT CHANGE UP (ref 22–31)
CREAT SERPL-MCNC: 1.03 MG/DL — SIGNIFICANT CHANGE UP (ref 0.5–1.3)
CREAT SERPL-MCNC: 1.4 MG/DL — HIGH (ref 0.5–1.3)
EGFR: 54 ML/MIN/1.73M2 — LOW
EGFR: 79 ML/MIN/1.73M2 — SIGNIFICANT CHANGE UP
GLUCOSE SERPL-MCNC: 127 MG/DL — HIGH (ref 70–99)
GLUCOSE SERPL-MCNC: 186 MG/DL — HIGH (ref 70–99)
MAGNESIUM SERPL-MCNC: 2 MG/DL — SIGNIFICANT CHANGE UP (ref 1.6–2.6)
POTASSIUM SERPL-MCNC: 3.5 MMOL/L — SIGNIFICANT CHANGE UP (ref 3.5–5.3)
POTASSIUM SERPL-MCNC: 3.9 MMOL/L — SIGNIFICANT CHANGE UP (ref 3.5–5.3)
POTASSIUM SERPL-SCNC: 3.5 MMOL/L — SIGNIFICANT CHANGE UP (ref 3.5–5.3)
POTASSIUM SERPL-SCNC: 3.9 MMOL/L — SIGNIFICANT CHANGE UP (ref 3.5–5.3)
SODIUM SERPL-SCNC: 141 MMOL/L — SIGNIFICANT CHANGE UP (ref 135–145)
SODIUM SERPL-SCNC: 143 MMOL/L — SIGNIFICANT CHANGE UP (ref 135–145)

## 2022-11-12 PROCEDURE — 71045 X-RAY EXAM CHEST 1 VIEW: CPT | Mod: 26

## 2022-11-12 RX ORDER — BUMETANIDE 0.25 MG/ML
0.5 INJECTION INTRAMUSCULAR; INTRAVENOUS
Qty: 20 | Refills: 0 | Status: DISCONTINUED | OUTPATIENT
Start: 2022-11-12 | End: 2022-11-14

## 2022-11-12 RX ORDER — APIXABAN 2.5 MG/1
5 TABLET, FILM COATED ORAL EVERY 12 HOURS
Refills: 0 | Status: DISCONTINUED | OUTPATIENT
Start: 2022-11-12 | End: 2022-11-17

## 2022-11-12 RX ORDER — POTASSIUM CHLORIDE 20 MEQ
40 PACKET (EA) ORAL ONCE
Refills: 0 | Status: COMPLETED | OUTPATIENT
Start: 2022-11-12 | End: 2022-11-12

## 2022-11-12 RX ADMIN — Medication 81 MILLIGRAM(S): at 11:54

## 2022-11-12 RX ADMIN — APIXABAN 5 MILLIGRAM(S): 2.5 TABLET, FILM COATED ORAL at 13:54

## 2022-11-12 RX ADMIN — TAMSULOSIN HYDROCHLORIDE 0.4 MILLIGRAM(S): 0.4 CAPSULE ORAL at 21:12

## 2022-11-12 RX ADMIN — HEPARIN SODIUM 1800 UNIT(S)/HR: 5000 INJECTION INTRAVENOUS; SUBCUTANEOUS at 04:30

## 2022-11-12 RX ADMIN — Medication 50 MILLIGRAM(S): at 06:00

## 2022-11-12 RX ADMIN — BUMETANIDE 2.5 MG/HR: 0.25 INJECTION INTRAMUSCULAR; INTRAVENOUS at 15:37

## 2022-11-12 RX ADMIN — HEPARIN SODIUM 1800 UNIT(S)/HR: 5000 INJECTION INTRAVENOUS; SUBCUTANEOUS at 04:28

## 2022-11-12 RX ADMIN — CHLORHEXIDINE GLUCONATE 1 APPLICATION(S): 213 SOLUTION TOPICAL at 05:54

## 2022-11-12 RX ADMIN — FINASTERIDE 5 MILLIGRAM(S): 5 TABLET, FILM COATED ORAL at 11:55

## 2022-11-12 RX ADMIN — Medication 40 MILLIGRAM(S): at 05:54

## 2022-11-12 RX ADMIN — Medication 40 MILLIEQUIVALENT(S): at 13:54

## 2022-11-12 RX ADMIN — ATORVASTATIN CALCIUM 20 MILLIGRAM(S): 80 TABLET, FILM COATED ORAL at 21:11

## 2022-11-12 NOTE — PROGRESS NOTE ADULT - ASSESSMENT
66 y/o male with hx of DVT ( two sepearte occasions ) was on eleiquis ( last used 6 months ago ) , AVR ( bovine ) ( repaired twice ?),  s/p PPM HTN, BPH, pacemaker, admitted with 2 day hx of SOB , exertional and worsened last night . NO cp   no fever or chills .  no N/V /cough   mild LE swelling   CT: No pulmonary embolism through the segmental branches. Several   subsegmental branches cannot be accurately evaluated.    Mild interstitial edema.  no  acute changes on EKG  trop neg     acute onset dyspnea :? sec to acute onset of CHF   Echo :NL  tele   cardio input noted ..stress abn ..cath  :  no significant CAD   elevated wedge   improved with lasix ... cont same   add asa, lipitor     chronic R femoral pulsating thrill: consult vasc and check US       eleated LFT : ? sec to congestion   monitor     afib: on BB   eliquis started              66 y/o male with hx of DVT ( two sepearte occasions ) was on eleiquis ( last used 6 months ago ) , AVR ( bovine ) ( repaired twice ?),  s/p PPM HTN, BPH, pacemaker, admitted with 2 day hx of SOB , exertional and worsened last night . NO cp   no fever or chills .  no N/V /cough   mild LE swelling   CT: No pulmonary embolism through the segmental branches. Several   subsegmental branches cannot be accurately evaluated.    Mild interstitial edema.  no  acute changes on EKG  trop neg     acute onset dyspnea :? sec to acute onset of CHF   Echo :NL  tele   cardio input noted ..stress abn ..cath  :  no significant CAD   elevated wedge   improved with lasix ... cont same   add asa, lipitor     chronic R femoral pulsating thrill:   Will need CTA abdomen/pelvis w/ left leg runoff for evaluation.  Will need open repair early next week.   Please optimize.    eleated LFT : ? sec to congestion   monitor     afib: on BB   on AC              68 y/o male with hx of DVT ( two sepearte occasions ) was on eleiquis ( last used 6 months ago ) , AVR ( bovine ) ( repaired twice ?),  s/p PPM HTN, BPH, pacemaker, admitted with 2 day hx of SOB , exertional and worsened last night . NO cp   no fever or chills .  no N/V /cough   mild LE swelling   CT: No pulmonary embolism through the segmental branches. Several   subsegmental branches cannot be accurately evaluated.    Mild interstitial edema.  no  acute changes on EKG  trop neg     acute onset dyspnea :? sec to acute onset of CHF   Echo :NL  tele   cardio input noted ..stress abn ..cath  :  no significant CAD   elevated wedge   improved with lasix ... cont same   add asa, lipitor     chronic R femoral pulsating thrill:   Will need CTA abdomen/pelvis w/ left leg runoff for evaluation.  Will need open repair early next week.   Please optimize.    eleated LFT : ? sec to congestion   monitor     afib: on BB   on AC       MARIO : fu with renal   will hold off on CT angio

## 2022-11-12 NOTE — PHYSICAL THERAPY INITIAL EVALUATION ADULT - ADDITIONAL COMMENTS
68 Pt lives alone in a PH +3 step to enter +10 steps to bedroom. Pt was independent with all mobility and ADLs prior to admission. Pt states that his son will be staying with him while he recovers.

## 2022-11-12 NOTE — PROGRESS NOTE ADULT - ASSESSMENT
ECHO 11/9/22:  Ef 73%: mild MS, fx bioprosthe avr, nl LV sys fx   mod pulm pressure   Stress test 11/9/22: abnl with evidence of infarct and shilo-infarct ischemia, LVEF 59%;   revealing hypokinesis of the basal inferior and basal septal walls and reduced systolic thickening of the basal to mid inferolateral, distal anterior, distal anteroseptal, and apical walls.   Nuclear Stress Test-Pharmacologic (06.03.15 @ 12:15) >  ------------------------------------------------------------------------  IMPRESSIONS:Normal Study  * Baseline ECG: Nonspecific ST-T wave abnormality.  * ECG Changes: No ischemic ST segment changes.  * Arrhythmia: None.  * Review of raw data shows: Minor motion artifact.  * The left ventricle was enlarged. There is a small, mild  defect in the apex that is mostly fixed and demonstrates  preserved wall motion suggestive of attenuation artifact.  * Post-stress gated wall motion analysis was performed  (LVEF = 62 %;LVEDV = 136 ml.) revealing paradoxical septal  motion consistent with a post-operative state.  ------------------------------------------------------------------------      a/p     66 y/o male with hx of ascending aortic aneurysm repair, DVT ( two sepearte occasions ) was on Eliquis ( last used 6 months ago ) , sp  AVR ( bovine ) ( repaired twice ?),  s/p PPM (medtronic), HTN, BPH,, admitted with 2 day hx of SOB     #acute on chronic DCHF   -cta chest No pulmonary embolism  -no ACS  -echo with nl LV sys fx, nl fxn bio avr   -stress test abnl with evidence of infarct and shilo-infarct ischemia, LVEF 59%;   revealing hypokinesis of the basal inferior and basal septal walls and reduced systolic thickening of the basal to mid inferolateral, distal anterior, distal anteroseptal, and apical walls  -s/p cath with luminal LCx/Ramus disease.  Mild proximal LAD disease.   -Severely elevated PASP in setting of markedly elevated filling pressures/wedge pressure. Severely elevated PASP likely secondary to increased LA pressure.   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   -patient recalls thrill on self palpation over the past 4-5 years  -PSA/AVF secondary to femoral artery access in 2013 for avr/poss cath   -vasc f/u  -outpt f/u for poss surgical ligation    #New onset afib   -sp PPM interrogation noted: Measured data WNL, normal pacemaker function, Pt is NOT pacemaker dependent ; Stored data revealed 3 episodes of AF lasting up to 13 hours, AF burden 0.4% as well as brief NSVT  -d/c iv hep, start eliquis 5mg bid today 4 hourd post heparin drip d/c  -cont BB     #Hx dvt (off AC)  -le doppler neg for dvt     #SP AVR   -stable on echo     #Sp PPM - [employer]   -ppm interrogation as above       45 minutes spent on total encounter; more than 50% of the visit was spent counseling and/or coordinating care by the attending physician.

## 2022-11-12 NOTE — PHYSICAL THERAPY INITIAL EVALUATION ADULT - PERTINENT HX OF CURRENT PROBLEM, REHAB EVAL
Pt is a 68 y/o male admitted with BRAND. PMH: DVT, AVR, s/p PPM, HTN, BPH, pacemaker. Pt admitted with SOB on exertion x2 days which worsened over night. CT (-) for PE, shows mild interstitial edema. S/p cardiac cath for PPM interrogation (11/10) with findings of 3 episodes of AF lasting up to 13 hours as well as brief NSVT. Vascular consulted for R femoral pulsating thrill. Ultrasound (-) for pseudoanuerysm. Pt is a 68 y/o male admitted with BRAND. PMH: DVT, AVR, s/p PPM, HTN, BPH, pacemaker. Pt admitted with SOB on exertion x2 days which worsened over night. CT (-) for PE, shows mild interstitial edema. S/p cardiac cath for PPM interrogation (11/10) with findings of 3 episodes of AF lasting up to 13 hours as well as brief NSVT. Vascular consulted for R femoral pulsating thrill. Ultrasound (-) for pseudoanuerysm. Dyspnea attributed to CHF.

## 2022-11-13 LAB
ALBUMIN SERPL ELPH-MCNC: 3.8 G/DL — SIGNIFICANT CHANGE UP (ref 3.3–5)
ALP SERPL-CCNC: 71 U/L — SIGNIFICANT CHANGE UP (ref 40–120)
ALT FLD-CCNC: 51 U/L — HIGH (ref 10–45)
ANION GAP SERPL CALC-SCNC: 13 MMOL/L — SIGNIFICANT CHANGE UP (ref 5–17)
ANION GAP SERPL CALC-SCNC: 15 MMOL/L — SIGNIFICANT CHANGE UP (ref 5–17)
AST SERPL-CCNC: 16 U/L — SIGNIFICANT CHANGE UP (ref 10–40)
BILIRUB SERPL-MCNC: 1.1 MG/DL — SIGNIFICANT CHANGE UP (ref 0.2–1.2)
BUN SERPL-MCNC: 42 MG/DL — HIGH (ref 7–23)
BUN SERPL-MCNC: 49 MG/DL — HIGH (ref 7–23)
CALCIUM SERPL-MCNC: 9.4 MG/DL — SIGNIFICANT CHANGE UP (ref 8.4–10.5)
CALCIUM SERPL-MCNC: 9.7 MG/DL — SIGNIFICANT CHANGE UP (ref 8.4–10.5)
CHLORIDE SERPL-SCNC: 94 MMOL/L — LOW (ref 96–108)
CHLORIDE SERPL-SCNC: 97 MMOL/L — SIGNIFICANT CHANGE UP (ref 96–108)
CO2 SERPL-SCNC: 29 MMOL/L — SIGNIFICANT CHANGE UP (ref 22–31)
CO2 SERPL-SCNC: 32 MMOL/L — HIGH (ref 22–31)
CREAT SERPL-MCNC: 1.53 MG/DL — HIGH (ref 0.5–1.3)
CREAT SERPL-MCNC: 1.56 MG/DL — HIGH (ref 0.5–1.3)
EGFR: 48 ML/MIN/1.73M2 — LOW
EGFR: 49 ML/MIN/1.73M2 — LOW
GLUCOSE SERPL-MCNC: 119 MG/DL — HIGH (ref 70–99)
GLUCOSE SERPL-MCNC: 120 MG/DL — HIGH (ref 70–99)
POTASSIUM SERPL-MCNC: 3.4 MMOL/L — LOW (ref 3.5–5.3)
POTASSIUM SERPL-MCNC: 3.6 MMOL/L — SIGNIFICANT CHANGE UP (ref 3.5–5.3)
POTASSIUM SERPL-SCNC: 3.4 MMOL/L — LOW (ref 3.5–5.3)
POTASSIUM SERPL-SCNC: 3.6 MMOL/L — SIGNIFICANT CHANGE UP (ref 3.5–5.3)
PROT SERPL-MCNC: 7.4 G/DL — SIGNIFICANT CHANGE UP (ref 6–8.3)
SARS-COV-2 RNA SPEC QL NAA+PROBE: SIGNIFICANT CHANGE UP
SODIUM SERPL-SCNC: 139 MMOL/L — SIGNIFICANT CHANGE UP (ref 135–145)
SODIUM SERPL-SCNC: 141 MMOL/L — SIGNIFICANT CHANGE UP (ref 135–145)

## 2022-11-13 PROCEDURE — 93010 ELECTROCARDIOGRAM REPORT: CPT

## 2022-11-13 RX ORDER — POTASSIUM CHLORIDE 20 MEQ
20 PACKET (EA) ORAL
Refills: 0 | Status: COMPLETED | OUTPATIENT
Start: 2022-11-13 | End: 2022-11-13

## 2022-11-13 RX ORDER — POTASSIUM CHLORIDE 20 MEQ
40 PACKET (EA) ORAL ONCE
Refills: 0 | Status: COMPLETED | OUTPATIENT
Start: 2022-11-13 | End: 2022-11-13

## 2022-11-13 RX ORDER — POTASSIUM CHLORIDE 20 MEQ
20 PACKET (EA) ORAL ONCE
Refills: 0 | Status: COMPLETED | OUTPATIENT
Start: 2022-11-13 | End: 2022-11-13

## 2022-11-13 RX ADMIN — Medication 20 MILLIEQUIVALENT(S): at 08:30

## 2022-11-13 RX ADMIN — Medication 81 MILLIGRAM(S): at 12:30

## 2022-11-13 RX ADMIN — TAMSULOSIN HYDROCHLORIDE 0.4 MILLIGRAM(S): 0.4 CAPSULE ORAL at 21:28

## 2022-11-13 RX ADMIN — Medication 20 MILLIEQUIVALENT(S): at 13:46

## 2022-11-13 RX ADMIN — Medication 20 MILLIEQUIVALENT(S): at 12:08

## 2022-11-13 RX ADMIN — BUMETANIDE 2.5 MG/HR: 0.25 INJECTION INTRAMUSCULAR; INTRAVENOUS at 08:32

## 2022-11-13 RX ADMIN — APIXABAN 5 MILLIGRAM(S): 2.5 TABLET, FILM COATED ORAL at 13:46

## 2022-11-13 RX ADMIN — ATORVASTATIN CALCIUM 20 MILLIGRAM(S): 80 TABLET, FILM COATED ORAL at 21:28

## 2022-11-13 RX ADMIN — CHLORHEXIDINE GLUCONATE 1 APPLICATION(S): 213 SOLUTION TOPICAL at 06:16

## 2022-11-13 RX ADMIN — FINASTERIDE 5 MILLIGRAM(S): 5 TABLET, FILM COATED ORAL at 12:09

## 2022-11-13 RX ADMIN — APIXABAN 5 MILLIGRAM(S): 2.5 TABLET, FILM COATED ORAL at 03:20

## 2022-11-13 RX ADMIN — Medication 40 MILLIEQUIVALENT(S): at 21:29

## 2022-11-13 RX ADMIN — BUMETANIDE 2.5 MG/HR: 0.25 INJECTION INTRAMUSCULAR; INTRAVENOUS at 17:45

## 2022-11-13 NOTE — CONSULT NOTE ADULT - SUBJECTIVE AND OBJECTIVE BOX
[facility]  [phone]  NEPHROLOGY      INITIAL CONSULT NOTE  --------------------------------------------------------------------------------  HPI:      68 y/o male with hx of DVT ( two sepearte occasions ) was on eleiquis ( last used 6 months ago ) , AVR ( bovine ) ( repaired twice ?),  s/p PPM HTN, BPH, pacemaker, admitted with 2 day hx of SOB , exertional and worsened.  Pt presented to er. noticed with LE swelling as well. had CTA on 11/8  chest revealing no pulmonary embolism through the segmental branches. Several subsegmental branches cannot be accurately evaluated and noticed with CHF. pt under went coronary angiogram on 11/10 and noticed with high EDP. started on bumex drip. cr started to rise renal consult called.       PAST HISTORY  --------------------------------------------------------------------------------  PAST MEDICAL & SURGICAL HISTORY:  Hypertension      History of BPH      Cardiac murmur      Malignant melanoma  left back      Cardiac pacemaker  05/2013      History of heart valve replacement  2005, 2013, porcine      S/P abdominal aortic aneurysm repair  2013        FAMILY HISTORY:  No pertinent family history in first degree relatives      PAST SOCIAL HISTORY:    ALLERGIES & MEDICATIONS  --------------------------------------------------------------------------------  Allergies    No Known Allergies    Intolerances      Standing Inpatient Medications  apixaban 5 milliGRAM(s) Oral every 12 hours  aspirin enteric coated 81 milliGRAM(s) Oral daily  atorvastatin 20 milliGRAM(s) Oral at bedtime  buMETAnide Infusion 0.5 mG/Hr IV Continuous <Continuous>  chlorhexidine 2% Cloths 1 Application(s) Topical <User Schedule>  finasteride 5 milliGRAM(s) Oral daily  metoprolol succinate ER 50 milliGRAM(s) Oral daily  potassium chloride    Tablet ER 40 milliEquivalent(s) Oral once  tamsulosin 0.4 milliGRAM(s) Oral at bedtime    PRN Inpatient Medications      REVIEW OF SYSTEMS  --------------------------------------------------------------------------------  Gen: No  fevers/chills   Skin: No rashes  Head/Eyes/Ears/Mouth: No headache; Normal hearing;  No sinus pain/discomfort, sore throat  Respiratory:  + dyspnea,  + cough,  - wheezing,  CV: No chest pain, orthopnea symptoms +   GI: No abdominal pain, diarrhea, nausea, vomiting, melena, hematochezia  : No dysuria, decrease urination or hesitancy urinating  hematuria, nocturia  MSK: No joint pain/swelling; no back pain  Neuro: No dizziness/lightheadedness    also with  +  edema       VITALS/PHYSICAL EXAM  --------------------------------------------------------------------------------  T(C): 36.6 (11-13-22 @ 11:31), Max: 36.8 (11-13-22 @ 05:01)  HR: 78 (11-13-22 @ 18:19) (61 - 90)  BP: 107/52 (11-13-22 @ 18:19) (94/54 - 131/62)  RR: 18 (11-13-22 @ 18:19) (18 - 18)  SpO2: 93% (11-13-22 @ 18:19) (87% - 97%)  Wt(kg): --        11-12-22 @ 07:01  -  11-13-22 @ 07:00  --------------------------------------------------------  IN: 195 mL / OUT: 3925 mL / NET: -3730 mL    11-13-22 @ 07:01  -  11-13-22 @ 20:23  --------------------------------------------------------  IN: 30 mL / OUT: 2050 mL / NET: -2020 mL      Physical Exam:  	Gen: Non toxic comfortable appearing   	+ JVD   	Pulm: decrease bs  no rales or ronchi or wheezing  	CV: RRR, S1S2; no rub  	Back: No CVA tenderness; no sacral edema  	Abd: +BS, soft, nontender/nondistended  	UE: Warm, no cyanosis  no clubbing,  no edema; no asterixis  	LE: Warm, no cyanosis  no clubbing, 2-  edema  	Neuro: alert and oriented. speech coherent   	Psych: Normal affect and mood  	Skin: Warm, no decrease skin turgor   	      LABS/STUDIES  --------------------------------------------------------------------------------    139  |  94  |  49  ----------------------------<  120      [11-13-22 @ 17:55]  3.6   |  32  |  1.56        Ca     9.7     [11-13-22 @ 17:55]      Mg     2.0     [11-12-22 @ 18:40]    TPro  7.4  /  Alb  3.8  /  TBili  1.1  /  DBili  x   /  AST  16  /  ALT  51  /  AlkPhos  71  [11-13-22 @ 05:58]      PTT: 96.2       [11-12-22 @ 10:45]      Creatinine Trend:  SCr 1.56 [11-13 @ 17:55]  SCr 1.53 [11-13 @ 05:58]  SCr 1.40 [11-12 @ 18:40]  SCr 1.03 [11-12 @ 10:45]  SCr 1.21 [11-11 @ 07:59]        TSH 2.86      [11-10-22 @ 06:58]  Lipid: chol 167, , HDL 52, LDL --      [11-10-22 @ 06:55]    HCV 0.14, Nonreact      [11-09-22 @ 06:55]    < from: Xray Chest 1 View- PORTABLE-Routine (Xray Chest 1 View- PORTABLE-Routine .) (11.12.22 @ 11:19) >    ACC: [id number] EXAM:  XR CHEST PORTABLE ROUTINE 1V                          PROCEDURE DATE:  11/12/2022          INTERPRETATION:  EXAMINATION: XR CHEST    CLINICAL INDICATION: CHF, with shortness of breath.    TECHNIQUE: Single frontal, portable view of the chest was obtained.    COMPARISON: Chest x-ray 11/8/2022.    FINDINGS:  Support devices: Left chest wall pacemaker. Median sternotomy.  The cardiomediastinal silhouette is  not accurately assessed in this AP   projection, however is likely enlarged.  Slightly increased pulmonary vascular congestion. New small right pleural   effusion with associated basilar subsegmental atelectasis.  There is no pneumothorax.  No acute bony abnormality.    IMPRESSION:  Slightly increased pulmonary vascular congestion with new small right   pleural effusion.    --- End of Report ---    < end of copied text >  < from: CT Angio Chest PE Protocol w/ IV Cont (11.08.22 @ 09:01) >    ACC: [id number] EXAM:  CT ANGIO CHEST PULM ART [facility]                          PROCEDURE DATE:  11/08/2022          INTERPRETATION:  INDICATION: Shortness of breath for 2 days    TECHNIQUE: Volumetric images of the chest were obtained after the   administration of 90 mL of Omnipaque 350. Maximum intensity projection   images were generated.    COMPARISON: CT chest 9/15/2015.    FINDINGS:    PULMONARY ANGIOGRAM:  Several subsegmental branches are not well assessed   due to respiratory motion. No pulmonary embolism from the main pulmonary   artery up to and including the segmental branches. Dilated pulmonary   artery, larger than the ascending aorta, also on prior.    LUNGS/AIRWAYS/PLEURA: No endobronchial lesion. Mild bilateral   interlobular septal thickening. No pleural effusion.    LYMPH NODES/MEDIASTINUM: No enlarged lymph nodes.    HEART/VASCULATURE: Enlarged heart. No pericardial effusion. Aortic valve   replacement. Appropriate course of dual-chamber pacemaker.    UPPER ABDOMEN: Cholelithiasis.    BONES/SOFT TISSUES: Sternotomy.      IMPRESSION:    No pulmonary embolism through the segmental branches. Several   subsegmental branches cannot be accurately evaluated.    Mild interstitial edema.    < end of copied text >

## 2022-11-13 NOTE — PROGRESS NOTE ADULT - ASSESSMENT
66 y/o male with hx of DVT ( two sepearte occasions ) was on eleiquis ( last used 6 months ago ) , AVR ( bovine ) ( repaired twice ?),  s/p PPM HTN, BPH, pacemaker, admitted with 2 day hx of SOB , exertional and worsened last night . NO cp   no fever or chills .  no N/V /cough   mild LE swelling   CT: No pulmonary embolism through the segmental branches. Several   subsegmental branches cannot be accurately evaluated.    Mild interstitial edema.  no  acute changes on EKG  trop neg     acute onset dyspnea :? sec to acute onset of CHF   Echo :NL  tele   cardio input noted ..stress abn ..cath  :  no significant CAD   elevated wedge   improved with lasix ... cont same   add asa, lipitor     chronic R femoral pulsating thrill: consult vasc and check US       eleated LFT : ? sec to congestion   monitor     afib: on BB   eliquis started              68 y/o male with hx of DVT ( two sepearte occasions ) was on eleiquis ( last used 6 months ago ) , AVR ( bovine ) ( repaired twice ?),  s/p PPM HTN, BPH, pacemaker, admitted with 2 day hx of SOB , exertional and worsened last night . NO cp   no fever or chills .  no N/V /cough   mild LE swelling   CT: No pulmonary embolism through the segmental branches. Several   subsegmental branches cannot be accurately evaluated.    Mild interstitial edema.  no  acute changes on EKG  trop neg     acute onset dyspnea :? sec to acute onset of CHF   Echo :NL  tele   cardio input noted ..stress abn ..cath  :  no significant CAD   elevated wedge   cont diuresis per Cardio    added asa, lipitor     chronic R femoral pulsating thrill:   Will need CTA abdomen/pelvis w/ left leg runoff for evaluation.  Will need open repair early next week.   Please optimize.  Patient should be bedrest with right groin extended.        eleated LFT : ? sec to congestion   monitor     afib: on BB   AC                68 y/o male with hx of DVT ( two sepearte occasions ) was on eleiquis ( last used 6 months ago ) , AVR ( bovine ) ( repaired twice ?),  s/p PPM HTN, BPH, pacemaker, admitted with 2 day hx of SOB , exertional and worsened last night . NO cp   no fever or chills .  no N/V /cough   mild LE swelling   CT: No pulmonary embolism through the segmental branches. Several   subsegmental branches cannot be accurately evaluated.    Mild interstitial edema.  no  acute changes on EKG  trop neg     acute onset dyspnea :? sec to acute onset of CHF   Echo :NL  tele   cardio input noted ..stress abn ..cath  :  no significant CAD   elevated wedge   cont diuresis per Cardio    added asa, lipitor     chronic R femoral pulsating thrill:   Will need CTA abdomen/pelvis w/ left leg runoff for evaluation.  Will need open repair early next week.   Please optimize.  Patient should be bedrest with right groin extended.        eleated LFT : ? sec to congestion   monitor     afib: on BB   AC       MARIO : fu with renal   will hold off on CT angio

## 2022-11-13 NOTE — PROGRESS NOTE ADULT - ASSESSMENT
ECHO 11/9/22:  Ef 73%: mild MS, fx bioprosthe avr, nl LV sys fx   mod pulm pressure   Stress test 11/9/22: abnl with evidence of infarct and shilo-infarct ischemia, LVEF 59%;   revealing hypokinesis of the basal inferior and basal septal walls and reduced systolic thickening of the basal to mid inferolateral, distal anterior, distal anteroseptal, and apical walls.   Nuclear Stress Test-Pharmacologic (06.03.15 @ 12:15) >  ------------------------------------------------------------------------  IMPRESSIONS:Normal Study  * Baseline ECG: Nonspecific ST-T wave abnormality.  * ECG Changes: No ischemic ST segment changes.  * Arrhythmia: None.  * Review of raw data shows: Minor motion artifact.  * The left ventricle was enlarged. There is a small, mild  defect in the apex that is mostly fixed and demonstrates  preserved wall motion suggestive of attenuation artifact.  * Post-stress gated wall motion analysis was performed  (LVEF = 62 %;LVEDV = 136 ml.) revealing paradoxical septal  motion consistent with a post-operative state.  ------------------------------------------------------------------------      a/p     68 y/o male with hx of ascending aortic aneurysm repair, DVT ( two sepearte occasions ) was on Eliquis ( last used 6 months ago ) , sp  AVR ( bovine ) ( repaired twice ?),  s/p PPM (medtronic), HTN, BPH,, admitted with 2 day hx of SOB     #acute on chronic DCHF, pulmonary edema  -cta chest No pulmonary embolism  -no ACS  -echo with nl LV sys fx, nl fxn bio avr   -stress test abnl with evidence of infarct and shilo-infarct ischemia, LVEF 59%;   revealing hypokinesis of the basal inferior and basal septal walls and reduced systolic thickening of the basal to mid inferolateral, distal anterior, distal anteroseptal, and apical walls  -s/p cath with luminal LCx/Ramus disease.  Mild proximal LAD disease.   -Severely elevated PASP in setting of markedly elevated filling pressures/wedge pressure. Severely elevated PASP likely secondary to increased LA pressure.   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   -patient recalls thrill on self palpation over the past 4-5 years  -PSA/AVF secondary to femoral artery access in 2013 for avr/poss cath   -vasc f/u  -outpt f/u for poss surgical ligation    #New onset afib   -sp PPM interrogation noted: Measured data WNL, normal pacemaker function, Pt is NOT pacemaker dependent ; Stored data revealed 3 episodes of AF lasting up to 13 hours, AF burden 0.4% as well as brief NSVT  -cont eliquis  -cont BB     #Hx dvt (off AC)  -le doppler neg for dvt     #SP AVR   -stable on echo     #Sp PPM - [employer]   -ppm interrogation as above       45 minutes spent on total encounter; more than 50% of the visit was spent counseling and/or coordinating care by the attending physician.

## 2022-11-14 LAB
ALBUMIN SERPL ELPH-MCNC: 4.3 G/DL — SIGNIFICANT CHANGE UP (ref 3.3–5)
ALP SERPL-CCNC: 79 U/L — SIGNIFICANT CHANGE UP (ref 40–120)
ALT FLD-CCNC: 54 U/L — HIGH (ref 10–45)
ANION GAP SERPL CALC-SCNC: 15 MMOL/L — SIGNIFICANT CHANGE UP (ref 5–17)
ANION GAP SERPL CALC-SCNC: 16 MMOL/L — SIGNIFICANT CHANGE UP (ref 5–17)
AST SERPL-CCNC: 26 U/L — SIGNIFICANT CHANGE UP (ref 10–40)
BILIRUB SERPL-MCNC: 1 MG/DL — SIGNIFICANT CHANGE UP (ref 0.2–1.2)
BUN SERPL-MCNC: 57 MG/DL — HIGH (ref 7–23)
BUN SERPL-MCNC: 64 MG/DL — HIGH (ref 7–23)
CALCIUM SERPL-MCNC: 10.2 MG/DL — SIGNIFICANT CHANGE UP (ref 8.4–10.5)
CALCIUM SERPL-MCNC: 10.2 MG/DL — SIGNIFICANT CHANGE UP (ref 8.4–10.5)
CHLORIDE SERPL-SCNC: 91 MMOL/L — LOW (ref 96–108)
CHLORIDE SERPL-SCNC: 94 MMOL/L — LOW (ref 96–108)
CO2 SERPL-SCNC: 32 MMOL/L — HIGH (ref 22–31)
CO2 SERPL-SCNC: 33 MMOL/L — HIGH (ref 22–31)
CREAT SERPL-MCNC: 1.72 MG/DL — HIGH (ref 0.5–1.3)
CREAT SERPL-MCNC: 1.75 MG/DL — HIGH (ref 0.5–1.3)
EGFR: 42 ML/MIN/1.73M2 — LOW
EGFR: 42 ML/MIN/1.73M2 — LOW
GLUCOSE SERPL-MCNC: 136 MG/DL — HIGH (ref 70–99)
GLUCOSE SERPL-MCNC: 179 MG/DL — HIGH (ref 70–99)
MAGNESIUM SERPL-MCNC: 2 MG/DL — SIGNIFICANT CHANGE UP (ref 1.6–2.6)
POTASSIUM SERPL-MCNC: 3.4 MMOL/L — LOW (ref 3.5–5.3)
POTASSIUM SERPL-MCNC: 4.1 MMOL/L — SIGNIFICANT CHANGE UP (ref 3.5–5.3)
POTASSIUM SERPL-SCNC: 3.4 MMOL/L — LOW (ref 3.5–5.3)
POTASSIUM SERPL-SCNC: 4.1 MMOL/L — SIGNIFICANT CHANGE UP (ref 3.5–5.3)
PROT SERPL-MCNC: 8.4 G/DL — HIGH (ref 6–8.3)
SODIUM SERPL-SCNC: 139 MMOL/L — SIGNIFICANT CHANGE UP (ref 135–145)
SODIUM SERPL-SCNC: 142 MMOL/L — SIGNIFICANT CHANGE UP (ref 135–145)
URATE SERPL-MCNC: 14.3 MG/DL — HIGH (ref 3.4–8.8)

## 2022-11-14 RX ORDER — POTASSIUM CHLORIDE 20 MEQ
20 PACKET (EA) ORAL
Refills: 0 | Status: COMPLETED | OUTPATIENT
Start: 2022-11-14 | End: 2022-11-14

## 2022-11-14 RX ORDER — ALLOPURINOL 300 MG
200 TABLET ORAL DAILY
Refills: 0 | Status: DISCONTINUED | OUTPATIENT
Start: 2022-11-14 | End: 2022-11-17

## 2022-11-14 RX ADMIN — APIXABAN 5 MILLIGRAM(S): 2.5 TABLET, FILM COATED ORAL at 13:48

## 2022-11-14 RX ADMIN — CHLORHEXIDINE GLUCONATE 1 APPLICATION(S): 213 SOLUTION TOPICAL at 05:25

## 2022-11-14 RX ADMIN — Medication 20 MILLIEQUIVALENT(S): at 17:33

## 2022-11-14 RX ADMIN — TAMSULOSIN HYDROCHLORIDE 0.4 MILLIGRAM(S): 0.4 CAPSULE ORAL at 21:39

## 2022-11-14 RX ADMIN — Medication 50 MILLIGRAM(S): at 05:24

## 2022-11-14 RX ADMIN — BUMETANIDE 2.5 MG/HR: 0.25 INJECTION INTRAMUSCULAR; INTRAVENOUS at 17:30

## 2022-11-14 RX ADMIN — ATORVASTATIN CALCIUM 20 MILLIGRAM(S): 80 TABLET, FILM COATED ORAL at 21:39

## 2022-11-14 RX ADMIN — Medication 20 MILLIEQUIVALENT(S): at 13:48

## 2022-11-14 RX ADMIN — FINASTERIDE 5 MILLIGRAM(S): 5 TABLET, FILM COATED ORAL at 12:04

## 2022-11-14 RX ADMIN — BUMETANIDE 2.5 MG/HR: 0.25 INJECTION INTRAMUSCULAR; INTRAVENOUS at 12:05

## 2022-11-14 RX ADMIN — APIXABAN 5 MILLIGRAM(S): 2.5 TABLET, FILM COATED ORAL at 05:23

## 2022-11-14 RX ADMIN — Medication 20 MILLIEQUIVALENT(S): at 16:29

## 2022-11-14 RX ADMIN — Medication 81 MILLIGRAM(S): at 12:04

## 2022-11-14 NOTE — DIETITIAN INITIAL EVALUATION ADULT - OTHER CALCULATIONS
Dosing wt 225 pounds used for calorie needs calculation; IBW+10% (BMI 30.5) 195.8 pounds for protein needs calculations. defer fluid needs to team per CHF

## 2022-11-14 NOTE — DIETITIAN INITIAL EVALUATION ADULT - NSFNSGIIOFT_GEN_A_CORE
- Pt denies nausea, vomiting, diarrhea, or constipation at this time   - Last BM:2-3 days  ago per pt's report; not currently ordered for bowel regimen

## 2022-11-14 NOTE — DIETITIAN INITIAL EVALUATION ADULT - PERTINENT MEDS FT
MEDICATIONS  (STANDING):  apixaban 5 milliGRAM(s) Oral every 12 hours  aspirin enteric coated 81 milliGRAM(s) Oral daily  atorvastatin 20 milliGRAM(s) Oral at bedtime  buMETAnide Infusion 0.5 mG/Hr (2.5 mL/Hr) IV Continuous <Continuous>  chlorhexidine 2% Cloths 1 Application(s) Topical <User Schedule>  finasteride 5 milliGRAM(s) Oral daily  metoprolol succinate ER 50 milliGRAM(s) Oral daily  tamsulosin 0.4 milliGRAM(s) Oral at bedtime    MEDICATIONS  (PRN):

## 2022-11-14 NOTE — CHART NOTE - NSCHARTNOTEFT_GEN_A_CORE
RD CHF education chart note    Patient was visited by RD for CHF education. Heart failure education provided to the patient in detail. Discussed heart failure nutrition therapy, sodium and fluid intake, importance of diet adherence, daily weights monitoring with the patient. Reinforced importance of weight gain parameters and importance of contacting MD’s about weight changes. Provided handouts on heart failure nutrition therapy, reading heart healthy nutrition labels, heart healthy shopping tips and low sodium food list. Patient verbalized understanding demonstrated by teach back method. RD contact information left with patient for any future questioning.    RD remains available to review diet education and adjust diet recommendations as needed.   Sharla Ga RD CDN #[phone]

## 2022-11-14 NOTE — DIETITIAN INITIAL EVALUATION ADULT - NSFNSPHYEXAMSKINFT_GEN_A_CORE
Pt states UBW ~225 pounds (102.1kg), reports he thinks he lost 10 pounds in house;   Weight in k.2 ( @ 08:40)  Weight in k.3 ( @ 08:34)  Weight in k.6 ( @ 08:12)  Weight in k.8 ( @ 07:36)  Weight in k.8 (11-10 @ 08:05)  - possible 6% wt loss in ~5 days ; CHF, wt fluctuations / fluid shifts ; continue to monitor wt trends   % IBW ( pounds)  Skin: no noted pressure injuries as per flowsheets

## 2022-11-14 NOTE — PROGRESS NOTE ADULT - ASSESSMENT
ECHO 11/9/22:  Ef 73%: mild MS, fx bioprosthe avr, nl LV sys fx   mod pulm pressure   Stress test 11/9/22: abnl with evidence of infarct and shilo-infarct ischemia, LVEF 59%;   revealing hypokinesis of the basal inferior and basal septal walls and reduced systolic thickening of the basal to mid inferolateral, distal anterior, distal anteroseptal, and apical walls.   Nuclear Stress Test-Pharmacologic (06.03.15 @ 12:15) >  ------------------------------------------------------------------------  IMPRESSIONS:Normal Study  * Baseline ECG: Nonspecific ST-T wave abnormality.  * ECG Changes: No ischemic ST segment changes.  * Arrhythmia: None.  * Review of raw data shows: Minor motion artifact.  * The left ventricle was enlarged. There is a small, mild  defect in the apex that is mostly fixed and demonstrates  preserved wall motion suggestive of attenuation artifact.  * Post-stress gated wall motion analysis was performed  (LVEF = 62 %;LVEDV = 136 ml.) revealing paradoxical septal  motion consistent with a post-operative state.  ------------------------------------------------------------------------      a/p     68 y/o male with hx of ascending aortic aneurysm repair, DVT ( two sepearte occasions ) was on Eliquis ( last used 6 months ago ) , sp  AVR ( bovine ) ( repaired twice ?),  s/p PPM (medtronic), HTN, BPH,, admitted with 2 day hx of SOB     #acute on chronic DCHF, pulmonary edema  -cta chest No pulmonary embolism  -no ACS  -echo with nl LV sys fx, nl fxn bio avr   -stress test abnl with evidence of infarct and shilo-infarct ischemia, LVEF 59%;   revealing hypokinesis of the basal inferior and basal septal walls and reduced systolic thickening of the basal to mid inferolateral, distal anterior, distal anteroseptal, and apical walls  -s/p cath with luminal LCx/Ramus disease.  Mild proximal LAD disease.   -Severely elevated PASP in setting of markedly elevated filling pressures/wedge pressure. Severely elevated PASP likely secondary to increased LA pressure.   -better output with bumex drip, metol  -vol status improving   -cxr 11/12 Slightly increased pulmonary vascular congestion with new small right  pleural effusion.  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

## 2022-11-14 NOTE — PROGRESS NOTE ADULT - ASSESSMENT
68 y/o male with hx of DVT ( two sepearte occasions ) was on eleiquis ( last used 6 months ago ) , AVR ( bovine ) ( repaired twice ?),  s/p PPM HTN, BPH, pacemaker, admitted with 2 day hx of SOB , exertional and worsened.  Pt presented to er. noticed with LE swelling as well. had CTA on 11/8  chest revealing no pulmonary embolism through the segmental branches. Several subsegmental branches cannot be accurately evaluated and noticed with CHF. pt under went coronary angiogram on 11/10 and noticed with high EDP. cr started to rise      1- MARIO   2- CHF   3- HTN  4- alkalosis   5- hyperuricemia     mario possibly in setting of contrast nephropathy   but more likely due to diuretics in decompensated chf pt.   cont with bumex drip @ 0.5 mg/hr   contaction alkalosis suspected. keep K > 4  cont toprol xl 50 mg daily   strict I/O  keep O> I   add allopurinol 200 mg daily

## 2022-11-14 NOTE — DIETITIAN INITIAL EVALUATION ADULT - NS FNS DIET ORDER
Diet, Regular (11-08-22 @ 15:43) [Active]       Consent 3/Introductory Paragraph: I gave the patient a chance to ask questions they had about the procedure.  Following this I explained the Mohs procedure and consent was obtained. The risks, benefits and alternatives to therapy were discussed in detail. Specifically, the risks of infection, scarring, bleeding, prolonged wound healing, incomplete removal, allergy to anesthesia, nerve injury and recurrence were addressed. Prior to the procedure, the treatment site was clearly identified and confirmed by the patient. All components of Universal Protocol/PAUSE Rule completed.

## 2022-11-14 NOTE — DIETITIAN INITIAL EVALUATION ADULT - NSFNSGIASSESSMENTFT_GEN_A_CORE
- Pt denies nausea, vomiting, diarrhea, or constipation at this time   - Last BM:2-3 days  ago per pt's report; not currently ordered for bowel regimen at this time

## 2022-11-14 NOTE — DIETITIAN INITIAL EVALUATION ADULT - PERTINENT LABORATORY DATA
11-14    142  |  94<L>  |  57<H>  ----------------------------<  136<H>  3.4<L>   |  33<H>  |  1.72<H>    Ca    10.2      14 Nov 2022 06:13  Mg     2.0     11-14    TPro  8.4<H>  /  Alb  4.3  /  TBili  1.0  /  DBili  x   /  AST  26  /  ALT  54<H>  /  AlkPhos  79  11-14 11-14 @ 06:13: Na 142, BUN 57<H>, Cr 1.72<H>, <H>, K+ 3.4<L>, Phos --, Mg 2.0, Alk Phos 79, ALT/SGPT 54<H>, AST/SGOT 26, HbA1c --  11-13 @ 17:55: Na 139, BUN 49<H>, Cr 1.56<H>, <H>, K+ 3.6, Phos --, Mg --, Alk Phos --, ALT/SGPT --, AST/SGOT --, HbA1c --    A1C with Estimated Average Glucose Result: 5.8 % (11-10-22 @ 06:56)

## 2022-11-14 NOTE — DIETITIAN INITIAL EVALUATION ADULT - ADD RECOMMEND
- Will continue to monitor PO intake, weight, labs, skin, GI status, diet.   - RD to add prune juice and extra veggies with meals to promote bowel movement per pt complains of no BM in 2-3 days; monitor   - Monitor intake, wt trends; monitor for malnutrition risk per possible wt loss in house  - Nutrition care plan to remain consistent with pt goals of care  - Nutrition education provided to pt with written materials; pt aware RD remains available to review education/ diet as needed.   - RD remains available to review diet education and adjust diet recommendations as needed.

## 2022-11-14 NOTE — DIETITIAN INITIAL EVALUATION ADULT - PERSON TAUGHT/METHOD
- Reviewed CHF diet education. Discussed Na restriction, foods high in Na to avoid, reading food labels, tips for limiting Na in your diet. Reviewed daily weights, Wt gain parameters to contact MD. Discussed Na intake in relation to fluid retention, edema and Wt gain. Pt verbalized understanding and accepted Heart Failure Nutrition Therapy Handout.  RD remains available for diet education review./verbal instruction/written material/teach back - (Patient repeats in own words)/patient instructed

## 2022-11-14 NOTE — DIETITIAN INITIAL EVALUATION ADULT - LAB (SPECIFY)
- noted  K+ 3.4<L>- defer replenish to team  - A1C with Estimated Average Glucose Result: 5.8 % (11-10-22 @ 06:56); monitor   - Trend BG levels, renal indices, LFT's, electrolytes and triglycerides

## 2022-11-14 NOTE — DIETITIAN INITIAL EVALUATION ADULT - ENERGY INTAKE
- pt reports good appetite and PO intake, says he is consuming most of his meals, but sometimes fluctuates related to liking/disliking institutional food/Adequate (%)

## 2022-11-14 NOTE — DIETITIAN INITIAL EVALUATION ADULT - REASON FOR ADMISSION
Shortness of breath  "68 y/o male with hx of DVT ( two sepearte occasions ) was on eleiquis ( last used 6 months ago ) , AVR ( bovine ) ( repaired twice ?),  s/p PPM HTN, BPH, pacemaker, admitted with 2 day hx of SOB , exertional and worsened.  Pt presented to er. noticed with LE swelling as well. had CTA on 11/8  chest revealing no pulmonary embolism through the segmental branches. Several subsegmental branches cannot be accurately evaluated and noticed with CHF. pt under went coronary angiogram on 11/10 and noticed with high EDP. cr started to rise"

## 2022-11-14 NOTE — DIETITIAN INITIAL EVALUATION ADULT - SIGNS/SYMPTOMS
pt denies previously provided with nutrition education / adhering to nutritional therapy  possible 6% wt loss x~5 days

## 2022-11-14 NOTE — DIETITIAN INITIAL EVALUATION ADULT - ORAL INTAKE PTA/DIET HISTORY
Pt endorses good appetite and PO intake PTA. Reports not following specific diet/ diet restrictions PTA and confirmed no known food allergies/ food intolerances

## 2022-11-14 NOTE — DIETITIAN INITIAL EVALUATION ADULT - OTHER INFO
Home Medications:  finasteride 5 mg oral tablet: 1 tab(s) orally once a day (10 Nov 2022 10:16)  metoprolol succinate 100 mg oral tablet, extended release: 1 tab(s) orally once a day (10 Nov 2022 10:16)  tamsulosin 0.4 mg oral capsule: 1 cap(s) orally 2 times a day (10 Nov 2022 10:16)  Vitamin C 1000 mg oral tablet: 1 tab(s) orally once a day (10 Nov 2022 10:16)  Vitamin D3 1000 intl units oral tablet: 1 tab(s) orally once a day (10 Nov 2022 10:16)

## 2022-11-15 ENCOUNTER — TRANSCRIPTION ENCOUNTER (OUTPATIENT)
Age: 67
End: 2022-11-15

## 2022-11-15 LAB
ANION GAP SERPL CALC-SCNC: 17 MMOL/L — SIGNIFICANT CHANGE UP (ref 5–17)
BUN SERPL-MCNC: 66 MG/DL — HIGH (ref 7–23)
CALCIUM SERPL-MCNC: 10.1 MG/DL — SIGNIFICANT CHANGE UP (ref 8.4–10.5)
CHLORIDE SERPL-SCNC: 93 MMOL/L — LOW (ref 96–108)
CO2 SERPL-SCNC: 31 MMOL/L — SIGNIFICANT CHANGE UP (ref 22–31)
CREAT SERPL-MCNC: 1.84 MG/DL — HIGH (ref 0.5–1.3)
EGFR: 39 ML/MIN/1.73M2 — LOW
GLUCOSE SERPL-MCNC: 131 MG/DL — HIGH (ref 70–99)
HCT VFR BLD CALC: 45.9 % — SIGNIFICANT CHANGE UP (ref 39–50)
HGB BLD-MCNC: 15.4 G/DL — SIGNIFICANT CHANGE UP (ref 13–17)
MAGNESIUM SERPL-MCNC: 2.2 MG/DL — SIGNIFICANT CHANGE UP (ref 1.6–2.6)
MCHC RBC-ENTMCNC: 31.4 PG — SIGNIFICANT CHANGE UP (ref 27–34)
MCHC RBC-ENTMCNC: 33.6 GM/DL — SIGNIFICANT CHANGE UP (ref 32–36)
MCV RBC AUTO: 93.7 FL — SIGNIFICANT CHANGE UP (ref 80–100)
NRBC # BLD: 0 /100 WBCS — SIGNIFICANT CHANGE UP (ref 0–0)
PLATELET # BLD AUTO: 226 K/UL — SIGNIFICANT CHANGE UP (ref 150–400)
POTASSIUM SERPL-MCNC: 3.4 MMOL/L — LOW (ref 3.5–5.3)
POTASSIUM SERPL-SCNC: 3.4 MMOL/L — LOW (ref 3.5–5.3)
RBC # BLD: 4.9 M/UL — SIGNIFICANT CHANGE UP (ref 4.2–5.8)
RBC # FLD: 13.4 % — SIGNIFICANT CHANGE UP (ref 10.3–14.5)
SODIUM SERPL-SCNC: 141 MMOL/L — SIGNIFICANT CHANGE UP (ref 135–145)
WBC # BLD: 9.76 K/UL — SIGNIFICANT CHANGE UP (ref 3.8–10.5)
WBC # FLD AUTO: 9.76 K/UL — SIGNIFICANT CHANGE UP (ref 3.8–10.5)

## 2022-11-15 RX ORDER — POTASSIUM CHLORIDE 20 MEQ
40 PACKET (EA) ORAL ONCE
Refills: 0 | Status: COMPLETED | OUTPATIENT
Start: 2022-11-15 | End: 2022-11-15

## 2022-11-15 RX ORDER — BUMETANIDE 0.25 MG/ML
1 INJECTION INTRAMUSCULAR; INTRAVENOUS DAILY
Refills: 0 | Status: DISCONTINUED | OUTPATIENT
Start: 2022-11-15 | End: 2022-11-17

## 2022-11-15 RX ADMIN — Medication 40 MILLIEQUIVALENT(S): at 11:30

## 2022-11-15 RX ADMIN — APIXABAN 5 MILLIGRAM(S): 2.5 TABLET, FILM COATED ORAL at 02:17

## 2022-11-15 RX ADMIN — ATORVASTATIN CALCIUM 20 MILLIGRAM(S): 80 TABLET, FILM COATED ORAL at 21:40

## 2022-11-15 RX ADMIN — Medication 81 MILLIGRAM(S): at 11:31

## 2022-11-15 RX ADMIN — FINASTERIDE 5 MILLIGRAM(S): 5 TABLET, FILM COATED ORAL at 11:31

## 2022-11-15 RX ADMIN — TAMSULOSIN HYDROCHLORIDE 0.4 MILLIGRAM(S): 0.4 CAPSULE ORAL at 21:40

## 2022-11-15 RX ADMIN — Medication 50 MILLIGRAM(S): at 05:13

## 2022-11-15 RX ADMIN — Medication 200 MILLIGRAM(S): at 11:31

## 2022-11-15 RX ADMIN — BUMETANIDE 1 MILLIGRAM(S): 0.25 INJECTION INTRAMUSCULAR; INTRAVENOUS at 14:10

## 2022-11-15 RX ADMIN — APIXABAN 5 MILLIGRAM(S): 2.5 TABLET, FILM COATED ORAL at 17:38

## 2022-11-15 NOTE — DISCHARGE NOTE PROVIDER - NSDCCPCAREPLAN_GEN_ALL_CORE_FT
PRINCIPAL DISCHARGE DIAGNOSIS  Diagnosis: SOB (shortness of breath)  Assessment and Plan of Treatment: You have a history of weakened heart muscle called congestive heart failure. This means that your heart does not pump blood to the rest of the body as it normally should. You should take Bumex 1mg daily to help reduce the amount of excess fluid that can back up to the legs, kidneys, and lungs. Please weigh yourself daily: if you have gained more than 2-3 lbs in one day or 5 lbs in one week contact your doctor immediately as you may be retaining water weight. In addition, restrict your salt intake to less than 2 grams a day. Please make sure you follow up with your cardiologist within one week of discharge. If you develop worsening shortening of breath, leg swelling, fatigue, chest pain, difficulty sleeping at night due to shortness of breath, contact your cardiologist immediately.      SECONDARY DISCHARGE DIAGNOSES  Diagnosis: Other persistent atrial fibrillation  Assessment and Plan of Treatment: You were found to be in atrial fibrillation (a type of irregular heartbeat) here in the hospital. People with atrial fibrillation are at an increased risk of forming a blood clot in the heart, which can travel to the brain, causing a stroke, or to other parts of the body. You have been placed on a Eliquis , it is important you take this medication as directed. ELIQUIS lowers your chance of having a stroke by helping to prevent clots from forming. If you stop taking ELIQUIS, you may have increased risk of forming a blood clot in your heart which can cause a stroke. Do not stop taking ELIQUIS without talking to your cardiologist. Additionally it is important to make sure your heart rate stays controlled, you have been started on Metoprolol 50mg daily to help control your heart rate. Please follow-up with your cardiologist for further managment.    Diagnosis: Pseudoaneurysm of vascular access site  Assessment and Plan of Treatment: You have a known pseudoaneurysm of the right femoral artery. You were seen by Vascular surgery, no plan for surgical intervention. Please follow up with your primary care provider within 1-2 weeks of discharge.     PRINCIPAL DISCHARGE DIAGNOSIS  Diagnosis: SOB (shortness of breath)  Assessment and Plan of Treatment: You have a history of weakened heart muscle called congestive heart failure. This means that your heart does not pump blood to the rest of the body as it normally should. You should take Bumex 1mg daily to help reduce the amount of excess fluid that can back up to the legs, kidneys, and lungs. Please weigh yourself daily: if you have gained more than 2-3 lbs in one day or 5 lbs in one week contact your doctor immediately as you may be retaining water weight. In addition, restrict your salt intake to less than 2 grams a day. Please make sure you follow up with your cardiologist within one week of discharge. If you develop worsening shortening of breath, leg swelling, fatigue, chest pain, difficulty sleeping at night due to shortness of breath, contact your cardiologist immediately.      SECONDARY DISCHARGE DIAGNOSES  Diagnosis: Atrial fibrillation  Assessment and Plan of Treatment: You were found to be in atrial fibrillation (a type of irregular heartbeat) here in the hospital when the PPM was interrogated -  Stored data revealed 3 episodes of AF lasting up to 13 hours, AF burden 0.4% as well as brief NSVT  Continue Xarelto and Metoprolol   People with atrial fibrillation are at an increased risk of forming a blood clot in the heart, which can travel to the brain, causing a stroke, or to other parts of the body. You have been placed on a Xarelto (blood thinner) it is important you take this medication as directed. ELIQUIS lowers your chance of having a stroke by helping to prevent clots from forming. If you stop taking Xarelto, you may have increased risk of forming a blood clot in your heart which can cause a stroke. Do not stop taking Xarelto without talking to your cardiologist. Additionally it is important to make sure your heart rate stays controlled, you have been started on Metoprolol 50mg daily to help control your heart rate.   Monitor your kidney function - Xarelto dose needs to be adjusted if kidney function worsens. Please follow-up with your cardiologist for further managment.    Diagnosis: Pseudoaneurysm of vascular access site  Assessment and Plan of Treatment: You have a known pseudoaneurysm of the right femoral artery.   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 Please follow up with your primary care provider  within 1-2 weeks of discharge.  -outpt f/u for poss surgical ligationYou    Diagnosis: History of DVT of lower extremity  Assessment and Plan of Treatment: Hx dvt (off blood thinner)  Lower leg doppler negative for dvt       Diagnosis: S/P AVR (aortic valve replacement)  Assessment and Plan of Treatment: Stable on echo   Follow up with Cardiologist    Diagnosis: MARIO (acute kidney injury)  Assessment and Plan of Treatment: Creatinine peaked to 1.86 likely d/t to aggressive diuresis - improved to 1.28.  Follow up with cardiologist - Dr. Segura on 11/21/22 fro follow up blood work  Avoid taking (NSAIDs) - (ex: Ibuprofen, Advil, Celebrex, Naprosyn)  Avoid taking any nephrotoxic agents (can harm kidneys) - Intravenous contrast for diagnostic testing, combination cold medications.  Have all medications adjusted for your renal function by your Health Care Provider.  Blood pressure control is important.  Take all medication as prescribed.      Diagnosis: Transaminitis  Assessment and Plan of Treatment: Liver enzymes  elevated due to Congestion in the setting of fluid overload - Now trended down.  Follow up with cardiologist - Dr. Segura on 11/21/22 fro follow up blood work     PRINCIPAL DISCHARGE DIAGNOSIS  Diagnosis: SOB (shortness of breath)  Assessment and Plan of Treatment: You have a history of weakened heart muscle called congestive heart failure. This means that your heart does not pump blood to the rest of the body as it normally should. You should take Bumex 1mg daily to help reduce the amount of excess fluid that can back up to the legs, kidneys, and lungs. Please weigh yourself daily: if you have gained more than 2-3 lbs in one day or 5 lbs in one week contact your doctor immediately as you may be retaining water weight. In addition, restrict your salt intake to less than 2 grams a day. Please make sure you follow up with your cardiologist within one week of discharge. If you develop worsening shortening of breath, leg swelling, fatigue, chest pain, difficulty sleeping at night due to shortness of breath, contact your cardiologist immediately.      SECONDARY DISCHARGE DIAGNOSES  Diagnosis: Atrial fibrillation  Assessment and Plan of Treatment: You were found to be in atrial fibrillation (a type of irregular heartbeat) here in the hospital when the PPM was interrogated -  Stored data revealed 3 episodes of AF lasting up to 13 hours, AF burden 0.4% as well as brief NSVT  Continue Xarelto and Metoprolol   People with atrial fibrillation are at an increased risk of forming a blood clot in the heart, which can travel to the brain, causing a stroke, or to other parts of the body. You have been placed on a Xarelto (blood thinner) it is important you take this medication as directed. ELIQUIS lowers your chance of having a stroke by helping to prevent clots from forming. If you stop taking Xarelto, you may have increased risk of forming a blood clot in your heart which can cause a stroke. Do not stop taking Xarelto without talking to your cardiologist. Additionally it is important to make sure your heart rate stays controlled, you have been started on Metoprolol 50mg daily to help control your heart rate.   Monitor your kidney function - Xarelto dose needs to be adjusted if kidney function worsens. Please follow-up with your cardiologist for further managment.    Diagnosis: Pseudoaneurysm of vascular access site  Assessment and Plan of Treatment: You have a known pseudoaneurysm of the right femoral artery.   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 Please follow up with your primary care provider  within 1-2 weeks of discharge.  -outpt f/u for poss surgical ligationYou    Diagnosis: History of DVT of lower extremity  Assessment and Plan of Treatment: Hx dvt (off blood thinner)  Lower leg doppler negative for dvt       Diagnosis: S/P AVR (aortic valve replacement)  Assessment and Plan of Treatment: Stable on echo   Follow up with Cardiologist    Diagnosis: MARIO (acute kidney injury)  Assessment and Plan of Treatment: Creatinine peaked to 1.86 likely d/t to aggressive diuresis - improved to 1.28.  Follow up with cardiologist - Dr. Segura on 11/21/22 fro follow up blood work  Avoid taking (NSAIDs) - (ex: Ibuprofen, Advil, Celebrex, Naprosyn)  Avoid taking any nephrotoxic agents (can harm kidneys) - Intravenous contrast for diagnostic testing, combination cold medications.  Have all medications adjusted for your renal function by your Health Care Provider.  Blood pressure control is important.  Take all medication as prescribed.      Diagnosis: Transaminitis  Assessment and Plan of Treatment: Liver enzymes  elevated due to Congestion in the setting of fluid overload - Now trended down.  Follow up with cardiologist - Dr. Segura on 11/21/22 at 8:45AM    Diagnosis: Hypoxia  Assessment and Plan of Treatment: Echo with bubble study negative for PFO  You are refusing any further work up in hospital  Follow up with cardiologist - Dr. Segura on 11/21/22 at 8:45AM    Diagnosis: CHARLI on CPAP  Assessment and Plan of Treatment: Continue CPAP and pt advised to change mask (pt reports he has new masks at home)

## 2022-11-15 NOTE — DISCHARGE NOTE PROVIDER - CARE PROVIDERS DIRECT ADDRESSES
,DirectAddress_Unknown ,DirectAddress_Unknown,[email or web address]

## 2022-11-15 NOTE — DISCHARGE NOTE PROVIDER - PROVIDER TOKENS
PROVIDER:[TOKEN:[[id number]],FOLLOWUP:[2 weeks],ESTABLISHEDPATIENT:[T]] PROVIDER:[TOKEN:[[id number]],FOLLOWUP:[1 week],ESTABLISHEDPATIENT:[T]],PROVIDER:[TOKEN:[[id number]],FOLLOWUP:[2 weeks]] PROVIDER:[TOKEN:[[id number]],SCHEDULEDAPPT:[11/21/2022],SCHEDULEDAPPTTIME:[08:45 AM],ESTABLISHEDPATIENT:[T]],PROVIDER:[TOKEN:[[id number]],FOLLOWUP:[2 weeks]]

## 2022-11-15 NOTE — PROGRESS NOTE ADULT - ASSESSMENT
68 y/o male with hx of DVT ( two sepearte occasions ) was on eleiquis ( last used 6 months ago ) , AVR ( bovine ) ( repaired twice ?),  s/p PPM HTN, BPH, pacemaker, admitted with 2 day hx of SOB , exertional and worsened last night . NO cp   no fever or chills .  no N/V /cough   mild LE swelling   CT: No pulmonary embolism through the segmental branches. Several   subsegmental branches cannot be accurately evaluated.    Mild interstitial edema.  no  acute changes on EKG  trop neg     acute onset dyspnea :? 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 High   volume flow is seen from the right great saphenous vein into the common   femoral vein.      hyperurecemia : cont allopurinol        eleated LFT : ? sec to congestion   monitor     afib: on BB   eliquis started              68 y/o male with hx of DVT ( two sepearte occasions ) was on eleiquis ( last used 6 months ago ) , AVR ( bovine ) ( repaired twice ?),  s/p PPM HTN, BPH, pacemaker, admitted with 2 day hx of SOB , exertional and worsened last night . NO cp   no fever or chills .  no N/V /cough   mild LE swelling   CT: No pulmonary embolism through the segmental branches. Several   subsegmental branches cannot be accurately evaluated.    Mild interstitial edema.  no  acute changes on EKG  trop neg     acute onset dyspnea :? 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 High   volume flow is seen from the right great saphenous vein into the common   femoral vein.  Will need CTA abdomen/pelvis w/ left leg runoff for evaluation.  Will need open repair early next week.   Please optimize.  Patient should be bedrest with right groin extended.    hyperurecemia : cont allopurinol        eleated LFT : ? sec to congestion   monitor     afib: on BB   eliquis started              68 y/o male with hx of DVT ( two sepearte occasions ) was on eleiquis ( last used 6 months ago ) , AVR ( bovine ) ( repaired twice ?),  s/p PPM HTN, BPH, pacemaker, admitted with 2 day hx of SOB , exertional and worsened last night . NO cp   no fever or chills .  no N/V /cough   mild LE swelling   CT: No pulmonary embolism through the segmental branches. Several   subsegmental branches cannot be accurately evaluated.    Mild interstitial edema.  no  acute changes on EKG  trop neg     acute onset dyspnea :? 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 High   volume flow is seen from the right great saphenous vein into the common   femoral vein.  Will need CTA abdomen/pelvis w/ left leg runoff for evaluation.  Will need open repair early next week.   Please optimize.  Patient should be bedrest with right groin extended.    hyperurecemia : cont allopurinol        eleated LFT : ? sec to congestion   monitor     afib: on BB   AC       MARIO : fu with renal   will hold off on CT angio

## 2022-11-15 NOTE — DISCHARGE NOTE PROVIDER - NSDCMRMEDTOKEN_GEN_ALL_CORE_FT
finasteride 5 mg oral tablet: 1 tab(s) orally once a day  metoprolol succinate 100 mg oral tablet, extended release: 1 tab(s) orally once a day  tamsulosin 0.4 mg oral capsule: 1 cap(s) orally 2 times a day  Vitamin C 1000 mg oral tablet: 1 tab(s) orally once a day  Vitamin D3 1000 intl units oral tablet: 1 tab(s) orally once a day   allopurinol 100 mg oral tablet: 2 tab(s) orally once a day  apixaban 5 mg oral tablet: 1 tab(s) orally every 12 hours  aspirin 81 mg oral delayed release tablet: 1 tab(s) orally once a day  atorvastatin 20 mg oral tablet: 1 tab(s) orally once a day (at bedtime)  bumetanide 1 mg oral tablet: 1 tab(s) orally once a day  finasteride 5 mg oral tablet: 1 tab(s) orally once a day  metoprolol succinate 50 mg oral tablet, extended release: 1 tab(s) orally once a day  tamsulosin 0.4 mg oral capsule: 1 cap(s) orally 2 times a day  Vitamin C 1000 mg oral tablet: 1 tab(s) orally once a day  Vitamin D3 1000 intl units oral tablet: 1 tab(s) orally once a day   allopurinol 100 mg oral tablet: 2 tab(s) orally once a day  aspirin 81 mg oral delayed release tablet: 1 tab(s) orally once a day  atorvastatin 20 mg oral tablet: 1 tab(s) orally once a day (at bedtime)  bumetanide 1 mg oral tablet: 1 tab(s) orally once a day  finasteride 5 mg oral tablet: 1 tab(s) orally once a day  metoprolol succinate 50 mg oral tablet, extended release: 1 tab(s) orally once a day  rivaroxaban 20 mg oral tablet: 1 tab(s) orally once a day (before a meal)  tamsulosin 0.4 mg oral capsule: 1 cap(s) orally 2 times a day  Vitamin C 1000 mg oral tablet: 1 tab(s) orally once a day  Vitamin D3 1000 intl units oral tablet: 1 tab(s) orally once a day

## 2022-11-15 NOTE — PROGRESS NOTE ADULT - ASSESSMENT
66 y/o male with hx of DVT ( two sepearte occasions ) was on eleiquis ( last used 6 months ago ) , AVR ( bovine ) ( repaired twice ?),  s/p PPM HTN, BPH, pacemaker, admitted with 2 day hx of SOB , exertional and worsened.  Pt presented to er. noticed with LE swelling as well. had CTA on 11/8  chest revealing no pulmonary embolism through the segmental branches. Several subsegmental branches cannot be accurately evaluated and noticed with CHF. pt under went coronary angiogram on 11/10 and noticed with high EDP. cr started to rise      1- MARIO   2- CHF   3- HTN  4- alkalosis   5- hyperuricemia       mario possibly in setting of contrast nephropathy, but more likely due to diuretics in decompensated chf pt.   creatinine steady, higher than baseline, this is expected  off bumex drip  to start bumex 1 mg po daily  contraction alkalosis suspected. keep K > 4  kcl supplementation today  cont toprol xl 50 mg daily   strict I/O  keep O> I   hyperuricemia, allopurinol 200 mg daily   d/w cards  discharge planning per primary team

## 2022-11-15 NOTE — DISCHARGE NOTE PROVIDER - NSDCFUADDAPPT_GEN_ALL_CORE_FT
Please follow up with your primary care provider within 1-2 weeks of discharge  Please follow up with your cardiologist within 1-2 weeks of discharge 1. Please follow up with your primary care provider within 1-2 weeks of discharge  2. Please follow up with your cardiologist within 1-2 weeks of discharge  3. Monitor your kidney function - Xarelto dose needs to be adjusted if kidney function worsens.    APPTS ARE READY TO BE MADE: [x] YES    Best Family or Patient Contact (if needed):    Additional Information about above appointments (if needed):    1: Follow up with cardiologist - Dr. Segura on 11/21/22 Monday  2:   3:     Other comments or requests:    1. Please follow up with your primary care provider within 1-2 weeks of discharge  2. Please follow up with your cardiologist within 1-2 weeks of discharge  3. Monitor your kidney function - Xarelto dose needs to be adjusted if kidney function worsens.    APPTS ARE READY TO BE MADE: [x] YES    Best Family or Patient Contact (if needed):    Additional Information about above appointments (if needed):    1: Follow up with cardiologist - Dr. Segura on 11/21/22 Monday at 8:45AM  2:   3:     Other comments or requests:    1. Please follow up with your primary care provider within 1-2 weeks of discharge  2. Please follow up with your cardiologist within 1-2 weeks of discharge  3. Monitor your kidney function - Xarelto dose needs to be adjusted if kidney function worsens.    APPTS ARE READY TO BE MADE: [x] YES    Best Family or Patient Contact (if needed):    Additional Information about above appointments (if needed):    1: Follow up with cardiologist - Dr. Segura on 11/21/22 Monday at 8:45AM  2: Patient was provided with follow up request details and was advised to call to schedule follow up within specified time frame.   3:     Other comments or requests:

## 2022-11-15 NOTE — DISCHARGE NOTE PROVIDER - HOSPITAL COURSE
68 y/o Male w/ PMHx of HTN, diastolic CHF, AAA s/p repair, DVT (s/p Eliquis), s/p AVR (Bovine), s/p PPM, BPH presented to the hospital c/p SOB/BRAND x2 days. Admitted for CHF exacerbation. Cardiology folowing. CTA chest negative for pulmonary embolism, ACS ruled out. Echo revaled nl LVSF and fxn bio AVR. Stress test was abnormal w/ evidence of infarct and shilo-infarct ischemia, LVEF 59%, revealing hypokinesis of basal inferior septal walls and reduced systolic thickening of the basal to mid inferolateral, distal anterior, distal anteroseptal, and apical walls. s/p cardiac cath reveal luminal LCx/Ramus disease and mild pLAD disease. PASP severely elevated i/s/o markedly elevated filling pressures/wedge pressure. s/p Bumex gtt and now transitioned to Bumex 1mg PO QD. MARIO noted, likely d/t to aggressive diuresis. RLE pseudoaneurysm noted prior to cath (thus site not used for access). 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 Vascular consulted, no plan for surgical intervention. Hospital course c/b new-onset Afib, s/p PPM interrogation. Eliquis and BB continued.     Patient seen and examined at the bedside on ______. No significant events on telemetry overnight. AM labs wnl, VSS/HD stable. Denies any complaints at this time. Patient has been medically cleared for discharge as per  ______. Patient has been given appropriate discharge instructions including medication regimen, and follow up. Medications that patient needs refills on (+/- new medications) have been e-prescribed to preferred pharmacy. Patient will f/u with  ______ in 1-2 weeks for further management.     Temp HR BP SpO2 RR; VSS  Gen: NAD, A&O x3  Cards: RRR, clear S1 and S2 without murmur  Pulm: CTA B/L without w/r/r  Vascular: Peripheral pulses 2+ B/L  Abd: soft, NT  Ext: no LE edema or ulcerations B/L 68 y/o Male w/ PMHx of HTN, diastolic CHF, AAA s/p repair, DVT x 2 separate episodes (s/p Eliquis, last used 6 months ago), s/p AVR (Bovine), s/p PPM, BPH presented to the hospital c/p SOB/BRAND x2 days.   Admitted for Acute on Chronic Diastolic HF Cardiology consulted. CTA chest negative for pulmonary embolism, ACS ruled out. Echo revealed normal LVSF and fxn bio AVR. Stress test was abnormal w/ evidence of infarct and shilo-infarct ischemia, LVEF 59%, revealing hypokinesis of basal inferior septal walls and reduced systolic thickening of the basal to mid inferolateral, distal anterior, distal anteroseptal, and apical walls. s/p cardiac cath reveal luminal LCx/Ramus disease and mild pLAD disease. PASP severely elevated i/s/o markedly elevated filling pressures/wedge pressure. s/p Bumex gtt and now transitioned to Bumex 1mg PO QD.     # RLE pseudoaneurysm noted prior to cath (thus site not used for access). 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 Vascular consulted, no plan for surgical intervention.   patient with episodes of Hypoxia - R/o shunting/PFO - JULIANO cancelled due to Enterorhinovirus infection. Echo with Bubble study ___________    # New-onset Afib, s/p PPM interrogation -  Stored data revealed 3 episodes of AF lasting up to 13 hours, AF burden 0.4% as well as brief NSVT   Placed on Eliquis and BB. Eliquis  not covered by insurance - switched to Xarelto 20mg once daily     #MARIO - Creatinine peaked to 1.86 likely d/t to aggressive diuresis - improved to 1.28    # Transaminitis - LFTs elevated sec to Congestion in the setting of fluid overload - Now trended down                 66 y/o Male w/ PMHx of HTN, diastolic CHF, AAA s/p repair, DVT x 2 separate episodes (s/p Eliquis, last used 6 months ago), s/p AVR (Bovine), s/p PPM, BPH presented to the hospital c/p SOB/BRAND x2 days.   Admitted for Acute on Chronic Diastolic HF Cardiology consulted. CTA chest negative for pulmonary embolism, ACS ruled out. Echo revealed normal LVSF and fxn bio AVR. Stress test was abnormal w/ evidence of infarct and shilo-infarct ischemia, LVEF 59%, revealing hypokinesis of basal inferior septal walls and reduced systolic thickening of the basal to mid inferolateral, distal anterior, distal anteroseptal, and apical walls. s/p cardiac cath reveal luminal LCx/Ramus disease and mild pLAD disease. PASP severely elevated i/s/o markedly elevated filling pressures/wedge pressure. s/p Bumex gtt and now transitioned to Bumex 1mg PO QD.     # RLE pseudoaneurysm noted prior to cath (thus site not used for access). 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 Vascular consulted, no plan for surgical intervention.   patient with episodes of Hypoxia - R/o shunting/PFO - JULIANO cancelled due to Enterorhinovirus infection. Echo with Bubble study  no evidence of a patent foramen ovale.    # New-onset Afib, s/p PPM interrogation -  Stored data revealed 3 episodes of AF lasting up to 13 hours, AF burden 0.4% as well as brief NSVT   Placed on Eliquis and BB. Eliquis  not covered by insurance - switched to Xarelto 20mg once daily     #MARIO - Creatinine peaked to 1.86 likely d/t to aggressive diuresis - improved to 1.28    # Transaminitis - LFTs elevated sec to Congestion in the setting of fluid overload - Now trended down.    # Hypoxia  - Echo with bubble study negative for PFO  - Pt refusing any further work up in hospital  - Follow up with cardiologist - Dr. Segura on 11/21/22 at 8:45AM     #CHARLI - continue CPAP and pt advised to change mask (pt has new masks at home)    Patient adamant on getting discharged, pt's own cardiologist Dr. Segura talked to pt - pt to follow up with Dr. Segura on Monday 11/21 at 8:45AM.  Cleared by Dr. Polanco to discharge pt

## 2022-11-15 NOTE — DISCHARGE NOTE PROVIDER - CARE PROVIDER_API CALL
Jed Segura (MD)  Cardiovascular Disease  [address]  [place] [address]  Phone: [phone]  Fax: [phone]  Established Patient  Follow Up Time: 2 weeks   Jed Segura (MD)  Cardiovascular Disease  [address] [facility] [address], [address]  [place] [address]  Phone: [phone]  Fax: [phone]  Established Patient  Follow Up Time: 1 week    Ning Larsen)  Surgery  [address] [facility], [address] [place] [address]  Phone: [phone]  Fax: [phone]  Follow Up Time: 2 weeks   Jed Segura (MD)  Cardiovascular Disease  [address] [facility], [address]  [place] [address]  Phone: [phone]  Fax: [phone]  Established Patient  Scheduled Appointment: 11/21/2022 08:45 AM    Ning Larsen)  Surgery  [address] [facility] [address] [place] [address]  Phone: [phone]  Fax: [phone]  Follow Up Time: 2 weeks

## 2022-11-15 NOTE — PROGRESS NOTE ADULT - ASSESSMENT
ECHO 11/9/22:  Ef 73%: mild MS, fx bioprosthe avr, nl LV sys fx   mod pulm pressure   Stress test 11/9/22: abnl with evidence of infarct and shilo-infarct ischemia, LVEF 59%;   revealing hypokinesis of the basal inferior and basal septal walls and reduced systolic thickening of the basal to mid inferolateral, distal anterior, distal anteroseptal, and apical walls.   Nuclear Stress Test-Pharmacologic (06.03.15 @ 12:15) >  ------------------------------------------------------------------------  IMPRESSIONS:Normal Study  * Baseline ECG: Nonspecific ST-T wave abnormality.  * ECG Changes: No ischemic ST segment changes.  * Arrhythmia: None.  * Review of raw data shows: Minor motion artifact.  * The left ventricle was enlarged. There is a small, mild  defect in the apex that is mostly fixed and demonstrates  preserved wall motion suggestive of attenuation artifact.  * Post-stress gated wall motion analysis was performed  (LVEF = 62 %;LVEDV = 136 ml.) revealing paradoxical septal  motion consistent with a post-operative state.  ------------------------------------------------------------------------      a/p     66 y/o male with hx of ascending aortic aneurysm repair, DVT ( two sepearte occasions ) was on Eliquis ( last used 6 months ago ) , sp  AVR ( bovine ) ( repaired twice ?),  s/p PPM (medtronic), HTN, BPH,, admitted with 2 day hx of SOB     #acute on chronic DCHF, pulmonary edema  -cta chest No pulmonary embolism  -no ACS  -echo with nl LV sys fx, nl fxn bio avr   -stress test abnl with evidence of infarct and shilo-infarct ischemia, LVEF 59%;   revealing hypokinesis of the basal inferior and basal septal walls and reduced systolic thickening of the basal to mid inferolateral, distal anterior, distal anteroseptal, and apical walls  -s/p cath with luminal LCx/Ramus disease.  Mild proximal LAD disease.   -Severely elevated PASP in setting of markedly elevated filling pressures/wedge pressure. Severely elevated PASP likely secondary to increased LA pressure.   -vol status improved   -cxr 11/12 Slightly increased pulmonary vascular congestion with new small right  pleural effusion.  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   -patient recalls thrill on self palpation over the past 4-5 years  -PSA/AVF secondary to femoral artery access in 2013 for avr/poss cath   -vasc f/u  -outpt f/u for poss surgical ligation    #New onset afib   -sp PPM interrogation noted: Measured data WNL, normal pacemaker function, Pt is NOT pacemaker dependent ; Stored data revealed 3 episodes of AF lasting up to 13 hours, AF burden 0.4% as well as brief NSVT  -cont eliquis  -cont BB     #Hx dvt (off AC)  -le doppler neg for dvt     #SP AVR   -stable on echo     #Sp PPM - Medtronic   -ppm interrogation as above       DCP from a cv standpoint- pt to see DR barfield as outpt cards

## 2022-11-15 NOTE — DISCHARGE NOTE PROVIDER - NSDCCPTREATMENT_GEN_ALL_CORE_FT
PRINCIPAL PROCEDURE  Procedure: Left heart catheterization  Findings and Treatment: s/p cardiac catheterization 11/10/22: Right dominant coronary circulation. LM no disease. pLAD (30%). D1/Cx/OM1/Ramus intermedius MI. RCA/RPDA no disease

## 2022-11-16 LAB
ANION GAP SERPL CALC-SCNC: 11 MMOL/L — SIGNIFICANT CHANGE UP (ref 5–17)
BASE EXCESS BLDA CALC-SCNC: 10.9 MMOL/L — HIGH (ref -2–3)
BUN SERPL-MCNC: 62 MG/DL — HIGH (ref 7–23)
CALCIUM SERPL-MCNC: 9.3 MG/DL — SIGNIFICANT CHANGE UP (ref 8.4–10.5)
CHLORIDE SERPL-SCNC: 100 MMOL/L — SIGNIFICANT CHANGE UP (ref 96–108)
CO2 BLDA-SCNC: 37 MMOL/L — HIGH (ref 19–24)
CO2 SERPL-SCNC: 31 MMOL/L — SIGNIFICANT CHANGE UP (ref 22–31)
CREAT SERPL-MCNC: 1.44 MG/DL — HIGH (ref 0.5–1.3)
EGFR: 53 ML/MIN/1.73M2 — LOW
GAS PNL BLDA: SIGNIFICANT CHANGE UP
GLUCOSE SERPL-MCNC: 117 MG/DL — HIGH (ref 70–99)
HCO3 BLDA-SCNC: 36 MMOL/L — HIGH (ref 21–28)
HCT VFR BLD CALC: 40.9 % — SIGNIFICANT CHANGE UP (ref 39–50)
HGB BLD-MCNC: 13.6 G/DL — SIGNIFICANT CHANGE UP (ref 13–17)
HOROWITZ INDEX BLDA+IHG-RTO: 21 — SIGNIFICANT CHANGE UP
MAGNESIUM SERPL-MCNC: 2.4 MG/DL — SIGNIFICANT CHANGE UP (ref 1.6–2.6)
MCHC RBC-ENTMCNC: 31.3 PG — SIGNIFICANT CHANGE UP (ref 27–34)
MCHC RBC-ENTMCNC: 33.3 GM/DL — SIGNIFICANT CHANGE UP (ref 32–36)
MCV RBC AUTO: 94.2 FL — SIGNIFICANT CHANGE UP (ref 80–100)
NRBC # BLD: 0 /100 WBCS — SIGNIFICANT CHANGE UP (ref 0–0)
PCO2 BLDA: 47 MMHG — SIGNIFICANT CHANGE UP (ref 35–48)
PH BLDA: 7.49 — HIGH (ref 7.35–7.45)
PHOSPHATE SERPL-MCNC: 3.6 MG/DL — SIGNIFICANT CHANGE UP (ref 2.5–4.5)
PLATELET # BLD AUTO: 199 K/UL — SIGNIFICANT CHANGE UP (ref 150–400)
PO2 BLDA: 65 MMHG — LOW (ref 83–108)
POTASSIUM SERPL-MCNC: 3.6 MMOL/L — SIGNIFICANT CHANGE UP (ref 3.5–5.3)
POTASSIUM SERPL-SCNC: 3.6 MMOL/L — SIGNIFICANT CHANGE UP (ref 3.5–5.3)
RAPID RVP RESULT: DETECTED
RBC # BLD: 4.34 M/UL — SIGNIFICANT CHANGE UP (ref 4.2–5.8)
RBC # FLD: 13.5 % — SIGNIFICANT CHANGE UP (ref 10.3–14.5)
RV+EV RNA SPEC QL NAA+PROBE: DETECTED
SAO2 % BLDA: 92.8 % — LOW (ref 94–98)
SARS-COV-2 RNA SPEC QL NAA+PROBE: SIGNIFICANT CHANGE UP
SODIUM SERPL-SCNC: 142 MMOL/L — SIGNIFICANT CHANGE UP (ref 135–145)
WBC # BLD: 8.62 K/UL — SIGNIFICANT CHANGE UP (ref 3.8–10.5)
WBC # FLD AUTO: 8.62 K/UL — SIGNIFICANT CHANGE UP (ref 3.8–10.5)

## 2022-11-16 PROCEDURE — 71045 X-RAY EXAM CHEST 1 VIEW: CPT | Mod: 26

## 2022-11-16 PROCEDURE — 93010 ELECTROCARDIOGRAM REPORT: CPT

## 2022-11-16 PROCEDURE — 93010 ELECTROCARDIOGRAM REPORT: CPT | Mod: 77

## 2022-11-16 RX ORDER — POTASSIUM CHLORIDE 20 MEQ
40 PACKET (EA) ORAL ONCE
Refills: 0 | Status: COMPLETED | OUTPATIENT
Start: 2022-11-16 | End: 2022-11-16

## 2022-11-16 RX ADMIN — APIXABAN 5 MILLIGRAM(S): 2.5 TABLET, FILM COATED ORAL at 18:21

## 2022-11-16 RX ADMIN — Medication 200 MILLIGRAM(S): at 11:37

## 2022-11-16 RX ADMIN — Medication 40 MILLIEQUIVALENT(S): at 12:38

## 2022-11-16 RX ADMIN — APIXABAN 5 MILLIGRAM(S): 2.5 TABLET, FILM COATED ORAL at 05:08

## 2022-11-16 RX ADMIN — FINASTERIDE 5 MILLIGRAM(S): 5 TABLET, FILM COATED ORAL at 11:36

## 2022-11-16 RX ADMIN — BUMETANIDE 1 MILLIGRAM(S): 0.25 INJECTION INTRAMUSCULAR; INTRAVENOUS at 05:08

## 2022-11-16 RX ADMIN — ATORVASTATIN CALCIUM 20 MILLIGRAM(S): 80 TABLET, FILM COATED ORAL at 21:15

## 2022-11-16 RX ADMIN — Medication 81 MILLIGRAM(S): at 11:37

## 2022-11-16 RX ADMIN — TAMSULOSIN HYDROCHLORIDE 0.4 MILLIGRAM(S): 0.4 CAPSULE ORAL at 21:15

## 2022-11-16 RX ADMIN — Medication 50 MILLIGRAM(S): at 05:08

## 2022-11-16 NOTE — PROVIDER CONTACT NOTE (OTHER) - SITUATION
Patient c/o weakness, dizziness, lightheadedness. Pt's IV infusing heparin infusion found to be leaking.
Patient expecotorated small amount of bright red blood mixed with sputum X 1 episode. No clots present.
Telemetry tech reported conversion from NSR to A Flutter 59.

## 2022-11-16 NOTE — PROVIDER CONTACT NOTE (OTHER) - BACKGROUND
Patient presented with new onset dyspnea diagnosed with heart failure. History of malignant melanoma, cardiac murmur, BPH, HTN.
Admitted for sob, CHF, +stress, new A Fib
Patient presented with new onset dyspnea diagnosed with heart failure. History of malignant melanoma, cardiac murmur, BPH, HTN.

## 2022-11-16 NOTE — PROVIDER CONTACT NOTE (OTHER) - REASON
Patient with small amount of blood with sputum
Patient c/o weakness, dizziness
Converted from NSR to AFlutter.

## 2022-11-16 NOTE — PROVIDER CONTACT NOTE (OTHER) - ACTION/TREATMENT ORDERED:
Telemetry monitoring continued.
CBC, metabolic panel, and APTT collected and sent to lab.
Stat CBC and T&S ordered. Lovenox d/c'd. Heparin nomogram infusion ordered.

## 2022-11-16 NOTE — PROGRESS NOTE ADULT - ASSESSMENT
68 y/o male with hx of DVT ( two sepearte occasions ) was on eleiquis ( last used 6 months ago ) , AVR ( bovine ) ( repaired twice ?),  s/p PPM HTN, BPH,  admitted with 2 day hx of SOB , exertional and worsened last night . NO cp   no fever or chills .  no N/V /cough   mild LE swelling   CT: No pulmonary embolism through the segmental branches. Several   subsegmental branches cannot be accurately evaluated.    Mild interstitial edema.  no  acute changes on EKG  trop neg     acute onset dyspnea :? sec to acute onset of CHF   Echo :NL   cardio input noted ..stress abn ..cath  :  no significant CAD   elevated wedge   cont diuresis : change to po   added asa, lipitor   pt with intermittent SOB and hypoxia : unclear etiology ???  shunting ???   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 High   volume flow is seen from the right great saphenous vein into the common   femoral vein.      hyperurecemia : cont allopurinol        eleated LFT : ? sec to congestion   monitor     afib: on BB   eliquis started              68 y/o male with hx of DVT ( two sepearte occasions ) was on eleiquis ( last used 6 months ago ) , AVR ( bovine ) ( repaired twice ?),  s/p PPM HTN, BPH, pacemaker, admitted with 2 day hx of SOB , exertional and worsened last night . NO cp   no fever or chills .  no N/V /cough   mild LE swelling   CT: No pulmonary embolism through the segmental branches. Several   subsegmental branches cannot be accurately evaluated.    Mild interstitial edema.  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 High   volume flow is seen from the right great saphenous vein into the common   femoral vein.  Will need CTA abdomen/pelvis w/ left leg runoff for evaluation.  Will need open repair early next week.     hyperurecemia : cont allopurinol        eleated LFT : ? sec to congestion   monitor     afib: on BB   eliquis started       WcT: cont BB   fu w cardio            68 y/o male with hx of DVT ( two sepearte occasions ) was on eleiquis ( last used 6 months ago ) , AVR ( bovine ) ( repaired twice ?),  s/p PPM HTN, BPH, pacemaker, admitted with 2 day hx of SOB , exertional and worsened last night . NO cp   no fever or chills .  no N/V /cough   mild LE swelling   CT: No pulmonary embolism through the segmental branches. Several   subsegmental branches cannot be accurately evaluated.    Mild interstitial edema.  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 High   volume flow is seen from the right great saphenous vein into the common   femoral vein.  Will need CTA abdomen/pelvis w/ left leg runoff for evaluation.  Will need open repair early next week.     hyperurecemia : cont allopurinol        eleated LFT : ? sec to congestion   monitor     afib: on BB   AC       MARIO : fu with renal   will hold off on CT angio         WcT: cont BB   fu w cardio

## 2022-11-16 NOTE — PROGRESS NOTE ADULT - ASSESSMENT
66 y/o male with hx of DVT ( two sepearte occasions ) was on eleiquis ( last used 6 months ago ) , AVR ( bovine ) ( repaired twice ?),  s/p PPM HTN, BPH, pacemaker, admitted with 2 day hx of SOB , exertional and worsened.  Pt presented to er. noticed with LE swelling as well. had CTA on 11/8  chest revealing no pulmonary embolism through the segmental branches. Several subsegmental branches cannot be accurately evaluated and noticed with CHF. pt under went coronary angiogram on 11/10 and noticed with high EDP. cr started to rise      1- MARIO   2- CHF   3- HTN  4- alkalosis   5- hyperuricemia       mario possibly in setting of contrast nephropathy, but more likely due to diuretics in decompensated chf pt.   cr is improving   cont bumex 1 mg po daily  contraction alkalosis suspected. keep K > 4  cont toprol xl 50 mg daily   strict I/O  keep O> I   hyperuricemia, allopurinol 200 mg daily   d/w cards team when seen earlier .

## 2022-11-16 NOTE — PROVIDER CONTACT NOTE (CHANGE IN STATUS NOTIFICATION) - ACTION/TREATMENT ORDERED:
Problem: Potential for Hypothermia Related to Thermoregulation  Goal: [place] will maintain body temperature between 97.6 degrees axillary F and 99.6 degrees axillary F in an open crib  Outcome: Progressing     Problem: Potential for Alteration Related to Poor Oral Intake or  Complications  Goal:  will maintain 90% of birthweight and optimal level of hydration  Outcome: Progressing     The patient is Stable - Low risk of patient condition declining or worsening    Shift Goals  Clinical Goals: maintain thermoregulation within normal limits/ work on feedings     Progress made toward(s) clinical / shift goals:  Vital signs stable. Parents educated on bundling infant with hat while in open crib. Parents educated on proper dress for infant.  I&Os charted every shift.  Mother of infant asked to call for help with breast feeding. LATCH score assessed.     Patient is not progressing towards the following goals:      
The patient is Stable - Low risk of patient condition declining or worsening    Shift Goals  Clinical Goals: Weight loss <10%    Progress made toward(s) clinical / shift goals:  Infant weight loss is 5.4%. MOB is breast feeding independently    Patient is not progressing towards the following goals:      
The patient is Stable - Low risk of patient condition declining or worsening    Shift Goals  Clinical Goals: maintain stable VS    Progress made toward(s) clinical / shift goals:  infant has maintained stable VS    Patient is not progressing towards the following goals: N/A      
EKG ordered, fit of home CPAP adjusted

## 2022-11-16 NOTE — CONSULT NOTE ADULT - SUBJECTIVE AND OBJECTIVE BOX
11-16-22 @ 14:30    Patient is a 67y old  Male who presents with a chief complaint of Shortness of breath  "66 y/o male with hx of DVT ( two sepearte occasions ) was on eleiquis ( last used 6 months ago ) , AVR ( bovine ) ( repaired twice ?),  s/p PPM HTN, BPH, pacemaker, admitted with 2 day hx of SOB , exertional and worsened.  Pt presented to er. noticed with LE swelling as well. had CTA on 11/8  chest revealing no pulmonary embolism through the segmental branches. Several subsegmental branches cannot be accurately evaluated and noticed with CHF. pt under went coronary angiogram on 11/10 and noticed with high EDP. cr started to rise"  no fever or chills .  no N/V /cough   mild LE swelling   CT: No pulmonary embolism through the segmental branches. Several   subsegmental branches cannot be accurately evaluated.  Mild interstitial edema.  o acute changes on EKG reportedly   trop neg  (08 Nov 2022 15:06):    he says he has no underlying lung disease  he is from [place] and says he never had tuberculosis:  he has no CHARLI: and do not use pxygen at home:    he dd ahve dvt twice before:         ?FOLLOWING PRESENT  [x ] Hx of PE/DVT, [x ] Hx COPD, [x] Hx of Asthma, y] Hx of Hospitalization, [y ]x  Hx of BiPAP/CPAP use, [xyHx of CHARLI    Allergies    No Known Allergies    Intolerances        PAST MEDICAL & SURGICAL HISTORY:  Hypertension      History of BPH      Cardiac murmur      Malignant melanoma  left back      Cardiac pacemaker  05/2013      History of heart valve replacement  2005, 2013, porcine      S/P abdominal aortic aneurysm repair  2013          FAMILY HISTORY:  No pertinent family history in first degree relatives        Social History: [ x ] TOBACCO                  [ x ] ETOH                                 [ x ] IVDA/DRUGS    REVIEW OF SYSTEMS      General:	x    Skin/Breast:x  	x  Ophthalmologic:  	  ENMT:	x    Respiratory and Thorax: sob, hypoxia  	  Cardiovascular:	z    Gastrointestinal:	x    Genitourinary:	x    Musculoskeletal:	x    Neurological:	x  x  Psychiatric:	    Hematology/Lymphatics:	x    Endocrine:	x    Allergic/Immunologic:	x    MEDICATIONS  (STANDING):  allopurinol 200 milliGRAM(s) Oral daily  apixaban 5 milliGRAM(s) Oral every 12 hours  aspirin enteric coated 81 milliGRAM(s) Oral daily  atorvastatin 20 milliGRAM(s) Oral at bedtime  buMETAnide 1 milliGRAM(s) Oral daily  chlorhexidine 2% Cloths 1 Application(s) Topical <User Schedule>  finasteride 5 milliGRAM(s) Oral daily  metoprolol succinate ER 50 milliGRAM(s) Oral daily  tamsulosin 0.4 milliGRAM(s) Oral at bedtime    MEDICATIONS  (PRN):       Vital Signs Last 24 Hrs  T(C): 36.7 (16 Nov 2022 10:53), Max: 36.9 (16 Nov 2022 00:19)  T(F): 98.1 (16 Nov 2022 10:53), Max: 98.4 (16 Nov 2022 00:19)  HR: 60 (16 Nov 2022 10:53) (60 - 74)  BP: 96/56 (16 Nov 2022 10:53) (96/56 - 118/68)  BP(mean): --  RR: 18 (16 Nov 2022 10:53) (16 - 20)  SpO2: 93% (16 Nov 2022 10:53) (91% - 95%)    Parameters below as of 16 Nov 2022 10:53  Patient On (Oxygen Delivery Method): room air    Orthostatic VS          I&O's Summary    15 Nov 2022 07:01  -  16 Nov 2022 07:00  --------------------------------------------------------  IN: 720 mL / OUT: 1750 mL / NET: -1030 mL    16 Nov 2022 07:01  -  16 Nov 2022 14:30  --------------------------------------------------------  IN: 480 mL / OUT: 600 mL / NET: -120 mL        Physical Exam:   GENERAL: NAD, well-groomed, well-developed  HEENT: BRIA/   Atraumatic, Normocephalic  ENMT: No tonsillar erythema, exudates, or enlargement; Moist mucous membranes, Good dentition, No lesions  NECK: Supple, No JVD, Normal thyroid  CHEST/LUNG: Clear to auscultation bilaterally  CVS: Regular rate and rhythm; No murmurs, rubs, or gallops  GI: : Soft, Nontender, Nondistended; Bowel sounds present  NERVOUS SYSTEM:  Alert & Oriented X3  EXTREMITIES: - edema  LYMPH: No lymphadenopathy noted  SKIN: No rashes or lesions  ENDOCRINOLOGY: No Thyromegaly  PSYCH: Appropriate    Labs:  ABG - ( 16 Nov 2022 06:56 )  pH, Arterial: 7.49  pH, Blood: x     /  pCO2: 47    /  pO2: 65    / HCO3: 36    / Base Excess: 10.9  /  SaO2: 92.8            SARS-CoV-2: NotDetec (16 Nov 2022 07:21)  COVID-19 PCR: NotDetec (13 Nov 2022 16:34)                              13.6   8.62  )-----------( 199      ( 16 Nov 2022 06:35 )             40.9                         15.4   9.76  )-----------( 226      ( 15 Nov 2022 07:31 )             45.9     11-16    142  |  100  |  62<H>  ----------------------------<  117<H>  3.6   |  31  |  1.44<H>  11-15    141  |  93<L>  |  66<H>  ----------------------------<  131<H>  3.4<L>   |  31  |  1.84<H>  11-14    139  |  91<L>  |  64<H>  ----------------------------<  179<H>  4.1   |  32<H>  |  1.75<H>  11-14    142  |  94<L>  |  57<H>  ----------------------------<  136<H>  3.4<L>   |  33<H>  |  1.72<H>  11-13    139  |  94<L>  |  49<H>  ----------------------------<  120<H>  3.6   |  32<H>  |  1.56<H>  11-13    141  |  97  |  42<H>  ----------------------------<  119<H>  3.4<L>   |  29  |  1.53<H>  11-12    141  |  101  |  38<H>  ----------------------------<  127<H>  3.9   |  28  |  1.40<H>    Ca    9.3      16 Nov 2022 06:35  Ca    10.1      15 Nov 2022 07:29  Ca    10.2      14 Nov 2022 17:45  Phos  3.6     11-16  Mg     2.4     11-16  Mg     2.2     11-15    TPro  8.4<H>  /  Alb  4.3  /  TBili  1.0  /  DBili  x   /  AST  26  /  ALT  54<H>  /  AlkPhos  79  11-14  TPro  7.4  /  Alb  3.8  /  TBili  1.1  /  DBili  x   /  AST  16  /  ALT  51<H>  /  AlkPhos  71  11-13    CAPILLARY BLOOD GLUCOSE        rad< from: Xray Chest 1 View- PORTABLE-Urgent (Xray Chest 1 View- PORTABLE-Urgent .) (11.16.22 @ 07:38) >  INTERPRETATION:  EXAMINATION: XR CHEST URGENT    CLINICAL INDICATION: hypoxia    TECHNIQUE: Single frontal, portable view of the chest was obtained.    COMPARISON: Chest x-ray 11/12/2022.    FINDINGS:  LINES/TUBES/SUPPORT DEVICES: Left chest wall pacemaker.    LUNGS: Mild bibasilar atelectasis.    PLEURA: No pleural effusion or pneumothorax.    HEART AND MEDIASTINUM: Heart size cannot be accurately assessed.   Sternotomy wires.    BONES: No acute bony abnormality.    IMPRESSION:  Mild bibasilar atelectasis.    --- End of Report ---          RYDER MUÑOZ MD; Resident Radiologist  This document has been electronically signed.  GENARO HALL MD; Attending Radiologist  This document has been electronically signed. Nov 16 2022  9:48AM    < end of copied text >  < from: US Duplex Arterial Lower Ext Ltd, Right (11.11.22 @ 15:00) >    ACC: [id number] EXAM:  US DPLX [employer] EXT ARTS LTD RT                          PROCEDURE DATE:  11/11/2022          INTERPRETATION:  Clinical information: 67-year-old with known right groin   pseudoaneurysm/AV fistula seen on prior study.    Duplex and color flow Doppler evaluation of the right groin was performed.    As noted on prior examination, a pseudoaneurysm is identified arising   from the right common femoral artery, measuring 2.5 x 2.3 cm. The   pseudoaneurysm has calcified borders consistent with chronic injury.    A fistulous communication is identified between the pseudoaneurysm cavity   and the right great saphenous vein. The right great saphenous vein is   dilated with high volume flow into the right common femoral vein.    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 High   volume flow is seen from the right great saphenous vein into the common   femoral vein.    --- End of Report---            KARIN GRAHAM MD; Attending Radiologist  This document has been electronically signed. Nov 11 2022  4:02PM    < end of copied text >  < from: US Duplex Arterial Lower Ext Ltd, Right (11.11.22 @ 15:00) >    ACC: [id number] EXAM:  US DPLX [employer] EXT ARTS LTD RT                          PROCEDURE DATE:  11/11/2022          INTERPRETATION:  Clinical information: 67-year-old with known right groin   pseudoaneurysm/AV fistula seen on prior study.    Duplex and color flow Doppler evaluation of the right groin was performed.    As noted on prior examination, a pseudoaneurysm is identified arising   from the right common femoral artery, measuring 2.5 x 2.3 cm. The   pseudoaneurysm has calcified borders consistent with chronic injury.    A fistulous communication is identified between the pseudoaneurysm cavity   and the right great saphenous vein. The right great saphenous vein is   dilated with high volume flow into the right common femoral vein.    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 High   volume flow is seen from the right great saphenous vein into the common   femoral vein.    --- End of Report---            KARIN GRAHAM MD; Attending Radiologist  This document has been electronically signed. Nov 11 2022  4:02PM    < end of copied text >  < from: VA Duplex Lower Ext Vein Scan, Bilat (11.09.22 @ 09:24) >  INTERPRETATION:  CLINICAL INFORMATION: Short of breath, lower extremity   swelling    COMPARISON: None available.    TECHNIQUE: Duplexsonography of the BILATERAL LOWER extremity veins with   color and spectral Doppler, with and without compression.    FINDINGS:    RIGHT: There is a varicose right common femoral vein.    Normal compressibility of the RIGHT common femoral, femoral and popliteal   veins.  Doppler examination shows normal spontaneous and phasic flow.  No RIGHT calf vein thrombosis is detected.    LEFT:  Normal compressibility of the LEFT common femoral, femoral and popliteal   veins.  Doppler examination shows normal spontaneous and phasic flow.  No LEFT calf vein thrombosis is detected.    IMPRESSION:  No evidence of deep venous thrombosis in either lower extremity.          --- End of Report ---            HARLEY PITTMAN MD; Attending Radiologist  This document has been electronically signed. Nov 9 2022 10:44AM    < end of copied text >        D DImer      Studies  Chest X-RAY  CT SCAN Chest   CT Abdomen  Venous Dopplers: LE:   Others  < from: CT Angio Chest PE Protocol w/ IV Cont (11.08.22 @ 09:01) >    COMPARISON: CT chest 9/15/2015.    FINDINGS:    PULMONARY ANGIOGRAM:  Several subsegmental branches are not well assessed   due to respiratory motion. No pulmonary embolism from the main pulmonary   artery up to and including the segmental branches. Dilated pulmonary   artery, larger than the ascending aorta, also on prior.    LUNGS/AIRWAYS/PLEURA: No endobronchial lesion. Mild bilateral   interlobular septal thickening. No pleural effusion.    LYMPH NODES/MEDIASTINUM: No enlarged lymph nodes.    HEART/VASCULATURE: Enlarged heart. No pericardial effusion. Aortic valve   replacement. Appropriate course of dual-chamber pacemaker.    UPPER ABDOMEN: Cholelithiasis.    BONES/SOFT TISSUES: Sternotomy.      IMPRESSION:    No pulmonary embolism through the segmental branches. Several   subsegmental branches cannot be accurately evaluated.    Mild interstitial edema.    --- End of Report ---            SHERWIN NIEVES M.D., ATTENDING RADIOGIST  This document has been electronically signed. Nov 8 2022  9:22AM    < end of copied text >

## 2022-11-16 NOTE — CONSULT NOTE ADULT - ASSESSMENT
66 y/o male with hx of DVT ( two sepearte occasions ) was on eleiquis ( last used 6 months ago ) , AVR ( bovine ) ( repaired twice ?),  s/p PPM HTN, BPH, pacemaker, admitted with 2 day hx of SOB , exertional and worsened.  Pt presented to er. noticed with LE swelling as well. had CTA on 11/8  chest revealing no pulmonary embolism through the segmental branches. Several subsegmental branches cannot be accurately evaluated and noticed with CHF. pt under went coronary angiogram on 11/10 and noticed with high EDP. cr started to rise      1- MARIO   2- CHF   3- HTN  4- alkalosis     mario possibly in setting of contrast nephropathy   but more likely due to diuretics in decompensated chf pt.   cont with bumex drip @ 0.5 mg/hr   contaction alkalosis suspected. keep K > 4  cont toprol xl 50 mg daily   strict I/O  keep O> I   check uric acid 
Nuclear Stress Test-Pharmacologic (06.03.15 @ 12:15) >  ------------------------------------------------------------------------  IMPRESSIONS:Normal Study  * Baseline ECG: Nonspecific ST-T wave abnormality.  * ECG Changes: No ischemic ST segment changes.  * Arrhythmia: None.  * Review of raw data shows: Minor motion artifact.  * The left ventricle was enlarged. There is a small, mild  defect in the apex that is mostly fixed and demonstrates  preserved wall motion suggestive of attenuation artifact.  * Post-stress gated wall motion analysis was performed  (LVEF = 62 %;LVEDV = 136 ml.) revealing paradoxical septal  motion consistent with a post-operative state.  ------------------------------------------------------------------------      a/p     68 y/o male with hx of DVT ( two sepearte occasions ) was on Eliquis ( last used 6 months ago ) , sp  AVR ( bovine ) ( repaired twice ?),  s/p PPM (medtronic), HTN, BPH,, admitted with 2 day hx of SOB     #acute on chronic DCHF   -cta chest No pulmonary embolism through the segmental branches. Several  subsegmental branches cannot be accurately evaluated. Mild interstitial edema.  -HS trop neg. no ischemic changes to ecg   -no recent ischemic eval   -Plan for stress testing   -echo with nl LV sys fx, fx bio avr   -c/w lasix 40 mg IVP daily   -resume BB     #Hx dvt (off AC)  -le doppler neg for dvt     #SP AVR   -stable on echo     #Sp PPM - [employer]   -please call ep to interrogate PPM r/o arrhythmias in the setting of new chf    dvt ppx 
67M hx DVT on eliquis, AVR x2, ascending aortic aneurysm repair, s/p PPM, HTN, BPH, admitted for SOB, found to have acute onset new HF with preserved EF, with abnormal stress test with ischemia. Pt. now s/p right and left heart cath via R radial artery and R femoral vein. Found to have pulsatile R groin on exam, duplex showing concern for PSA with AVF. Vascular surgery consulted for management.     RECOMMENDATIONS:  - No acute vascular surgery intervention  - Will hold ultrasound guided pressure   - Please repeat duplex in AM to assess pseudoaneurysm     Seen and examined with vascular fellow    ABDELRAHMAN Sanderson, PGY-2  Vascular Surgery  [id number]
Patient is a 67y old  Male who presents with a chief complaint of Shortness of breath  "66 y/o male with hx of DVT ( two sepearte occasions ) was on eleiquis ( last used 6 months ago ) , AVR ( bovine ) ( repaired twice ?),  s/p PPM HTN, BPH, pacemaker, admitted with 2 day hx of SOB , exertional and worsened.  Pt presented to er. noticed with LE swelling as well. had CTA on 11/8  chest revealing no pulmonary embolism through the segmental branches. Several subsegmental branches cannot be accurately evaluated and noticed with CHF. pt under went coronary angiogram on 11/10 and noticed with high EDP. cr started to rise"  no fever or chills .  no N/V /cough   mild LE swelling   CT: No pulmonary embolism through the segmental branches. Several   subsegmental branches cannot be accurately evaluated.  Mild interstitial edema.  o acute changes on EKG reportedly   trop neg  (08 Nov 2022 15:06):    he says he has no underlying lung disease  he is from [place] and says he never had tuberculosis:  he has no CHARLI: and do not use pxygen at home:    he dd ahve dvt twice before:     11/16: The reason for hypoxia is not clear:  he has charli and is  using cpap but he desaturated while awake too : he has no pe and he is alerday on full AC: he is not a drinker also and has no cirrhosis  he does have a fistula in groin :? need to rule out shunting in the heart or lungs:  though seems doubtful ? Is fistula giving him hypoxia : can get vascular to see the flow though fistula to see if there is significant intermixing of blood:   or givng him high output heart fialure : he has no underlying lung parenchyma disease:   dw team

## 2022-11-16 NOTE — CHART NOTE - NSCHARTNOTEFT_GEN_A_CORE
Vital Signs Last 24 Hrs  T(C): 36.9 (16 Nov 2022 00:19), Max: 36.9 (15 Nov 2022 10:46)  T(F): 98.4 (16 Nov 2022 00:19), Max: 98.5 (15 Nov 2022 10:46)  HR: 73 (16 Nov 2022 00:19) (61 - 82)  BP: 105/53 (16 Nov 2022 00:19) (97/57 - 105/53)  BP(mean): --  RR: 16 (16 Nov 2022 00:19) (16 - 20)  SpO2: 92% (16 Nov 2022 00:19) (90% - 93%)    Parameters below as of 16 Nov 2022 00:19  Patient On (Oxygen Delivery Method): Home CPAP RN to PA- Patient had first time event, 14 beats WCT for 6.64 seconds with heart rate up to 150BPM. Patient's oxygen was also desaturating down to low 80s while on CPAP. Patient was seen at bed in no acute distress, A&Ox4. Patient denies any chest pain, shortness of breath, dyspnea, headaches, weakness, dizziness, or abdominal pain. Patient's CPAP was not sealed at the time, which is why patient was desatting. However, patient was desatting to low 80s while awake and off CPAP. O2 improved to mid 90s while on 2L nasal cannula. Will possibly require O2 in morning when off CPAP. Patient is being diuresed at this time on PO bumex 1mg QD with decent urine output. Patient does not use oxygen at home.      Vital Signs Last 24 Hrs  T(C): 36.9 (16 Nov 2022 00:19), Max: 36.9 (15 Nov 2022 10:46)  T(F): 98.4 (16 Nov 2022 00:19), Max: 98.5 (15 Nov 2022 10:46)  HR: 73 (16 Nov 2022 00:19) (61 - 82)  BP: 105/53 (16 Nov 2022 00:19) (97/57 - 105/53)  BP(mean): --  RR: 16 (16 Nov 2022 00:19) (16 - 20)  SpO2: 92% (16 Nov 2022 00:19) (90% - 93%)    Parameters below as of 16 Nov 2022 00:19  Patient On (Oxygen Delivery Method): Home CPAP    Plan:   1) Arrythmia  - EKG stat- no major changes  - electrolytes stable at this time and being supplemented. Will f/u lytes in morning labs    2) Hypoxia  - when using CPAP to make sure the mask is sealed  - may need to trial nasal cannula in AM  - consider increasing bumex if patient continues to be hypoxic in AM while off CPAP  - will relay overnight events to attending in AM for recommendations  - will c/w monitoring on tele overnight    SUNITHA Dill-C  [id number] RN to PA- Patient had first time event, 14 beats WCT for 6.64 seconds with heart rate up to 150BPM. Patient's oxygen was also desaturating down to low 80s while on CPAP. Patient was seen at bed in no acute distress, A&Ox4. Patient denies any chest pain, shortness of breath, dyspnea, headaches, weakness, dizziness, or abdominal pain. Patient's CPAP was not sealed at the time, which is why patient was desatting. However, patient was desatting to low 80s while awake and off CPAP. O2 improved to mid 90s while on 2L nasal cannula. Will possibly require O2 in morning when off CPAP. Patient is being diuresed at this time on PO bumex 1mg QD with decent urine output. Patient does not use oxygen at home.      Vital Signs Last 24 Hrs  T(C): 36.9 (16 Nov 2022 00:19), Max: 36.9 (15 Nov 2022 10:46)  T(F): 98.4 (16 Nov 2022 00:19), Max: 98.5 (15 Nov 2022 10:46)  HR: 73 (16 Nov 2022 00:19) (61 - 82)  BP: 105/53 (16 Nov 2022 00:19) (97/57 - 105/53)  BP(mean): --  RR: 16 (16 Nov 2022 00:19) (16 - 20)  SpO2: 92% (16 Nov 2022 00:19) (90% - 93%)    Parameters below as of 16 Nov 2022 00:19  Patient On (Oxygen Delivery Method): Home CPAP    Plan:   1) Arrythmia  - EKG stat- no major changes  - electrolytes stable at this time and being supplemented. Will f/u lytes in morning labs    2) Hypoxia  - when using CPAP to make sure the mask is sealed  - may need to trial nasal cannula in AM  - consider increasing bumex if patient continues to be hypoxic in AM while off CPAP  - will relay overnight events to attending in AM for recommendations  - will c/w monitoring on tele overnight       ADDENDUM- 11/16/22- 6:31 AM  Patient has been off CPAP for half hour this morning with no events of hypoxia. Trending between 90-95% and asymptomatic.     - RVP/covid swab stat  - Discussed with attending, Dr. Polanco- CXR and ABG stat. call for pulmonary consult in AM (  )   - Follow up morning electrolytes  - Will relay to day team in AM    Adry Low PA-C  [id number]

## 2022-11-16 NOTE — PROGRESS NOTE ADULT - ASSESSMENT
ECHO 11/9/22:  Ef 73%: mild MS, fx bioprosthe avr, nl LV sys fx   mod pulm pressure   Stress test 11/9/22: abnl with evidence of infarct and shilo-infarct ischemia, LVEF 59%;   revealing hypokinesis of the basal inferior and basal septal walls and reduced systolic thickening of the basal to mid inferolateral, distal anterior, distal anteroseptal, and apical walls.   Nuclear Stress Test-Pharmacologic (06.03.15 @ 12:15) >  ------------------------------------------------------------------------  IMPRESSIONS:Normal Study  * Baseline ECG: Nonspecific ST-T wave abnormality.  * ECG Changes: No ischemic ST segment changes.  * Arrhythmia: None.  * Review of raw data shows: Minor motion artifact.  * The left ventricle was enlarged. There is a small, mild  defect in the apex that is mostly fixed and demonstrates  preserved wall motion suggestive of attenuation artifact.  * Post-stress gated wall motion analysis was performed  (LVEF = 62 %;LVEDV = 136 ml.) revealing paradoxical septal  motion consistent with a post-operative state.  ------------------------------------------------------------------------      a/p     66 y/o male with hx of ascending aortic aneurysm repair, DVT ( two sepearte occasions ) was on Eliquis ( last used 6 months ago ) , sp  AVR ( bovine ) ( repaired twice ?),  s/p PPM (medtronic), HTN, BPH,, admitted with 2 day hx of SOB     #acute on chronic DCHF, pulmonary edema  -cta chest No pulmonary embolism  -no ACS  -echo with nl LV sys fx, nl fxn bio avr   -stress test abnl with evidence of infarct and shilo-infarct ischemia, LVEF 59%;   revealing hypokinesis of the basal inferior and basal septal walls and reduced systolic thickening of the basal to mid inferolateral, distal anterior, distal anteroseptal, and apical walls  -s/p cath with luminal LCx/Ramus disease.  Mild proximal LAD disease.   -Severely elevated PASP in setting of markedly elevated filling pressures/wedge pressure. Severely elevated PASP likely secondary to increased LA pressure.   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

## 2022-11-16 NOTE — PROVIDER CONTACT NOTE (OTHER) - ASSESSMENT
A&Ox4. denies chest pain, sob or dizziness or palpitations. /65, 65, 91% on room air.
Patient is alert and oriented X4. Dyspneic on exertion, oxygen saturation on 3 liters > 93%. Vitals stable. No other signs/symptoms of hemorrhage present. Right radial and femoral sites s/p cardiac cath clean dry, and intact. No hematoma present. + 2 radial/femoral pulses present bilaterally.
Patient is alert and oriented X4. Dyspneic on exertion, oxygen saturation on 3 liters > 93%. Vitals stable. No other signs/symptoms of hemorrhage present. Right radial and femoral sites s/p cardiac cath clean dry, and intact. No hematoma present. + 2 radial/femoral pulses present bilaterally.

## 2022-11-16 NOTE — PROVIDER CONTACT NOTE (OTHER) - RECOMMENDATIONS
Continue monitor.
None
Initial BP 88/44, repeat BP 91/45, pulse 65, respirations 20, 95% on 3 liters nasal canula,

## 2022-11-17 ENCOUNTER — TRANSCRIPTION ENCOUNTER (OUTPATIENT)
Age: 67
End: 2022-11-17

## 2022-11-17 VITALS
DIASTOLIC BLOOD PRESSURE: 58 MMHG | OXYGEN SATURATION: 95 % | RESPIRATION RATE: 18 BRPM | TEMPERATURE: 98 F | HEART RATE: 72 BPM | SYSTOLIC BLOOD PRESSURE: 112 MMHG

## 2022-11-17 LAB
ANION GAP SERPL CALC-SCNC: 11 MMOL/L — SIGNIFICANT CHANGE UP (ref 5–17)
BUN SERPL-MCNC: 55 MG/DL — HIGH (ref 7–23)
CALCIUM SERPL-MCNC: 9.2 MG/DL — SIGNIFICANT CHANGE UP (ref 8.4–10.5)
CHLORIDE SERPL-SCNC: 101 MMOL/L — SIGNIFICANT CHANGE UP (ref 96–108)
CO2 SERPL-SCNC: 29 MMOL/L — SIGNIFICANT CHANGE UP (ref 22–31)
CREAT SERPL-MCNC: 1.28 MG/DL — SIGNIFICANT CHANGE UP (ref 0.5–1.3)
EGFR: 61 ML/MIN/1.73M2 — SIGNIFICANT CHANGE UP
GLUCOSE SERPL-MCNC: 108 MG/DL — HIGH (ref 70–99)
MAGNESIUM SERPL-MCNC: 2.5 MG/DL — SIGNIFICANT CHANGE UP (ref 1.6–2.6)
POTASSIUM SERPL-MCNC: 3.9 MMOL/L — SIGNIFICANT CHANGE UP (ref 3.5–5.3)
POTASSIUM SERPL-SCNC: 3.9 MMOL/L — SIGNIFICANT CHANGE UP (ref 3.5–5.3)
SODIUM SERPL-SCNC: 141 MMOL/L — SIGNIFICANT CHANGE UP (ref 135–145)

## 2022-11-17 PROCEDURE — 84550 ASSAY OF BLOOD/URIC ACID: CPT

## 2022-11-17 PROCEDURE — 93308 TTE F-UP OR LMTD: CPT

## 2022-11-17 PROCEDURE — 86803 HEPATITIS C AB TEST: CPT

## 2022-11-17 PROCEDURE — 93926 LOWER EXTREMITY STUDY: CPT

## 2022-11-17 PROCEDURE — 82553 CREATINE MB FRACTION: CPT

## 2022-11-17 PROCEDURE — 85025 COMPLETE CBC W/AUTO DIFF WBC: CPT

## 2022-11-17 PROCEDURE — 83880 ASSAY OF NATRIURETIC PEPTIDE: CPT

## 2022-11-17 PROCEDURE — 87641 MR-STAPH DNA AMP PROBE: CPT

## 2022-11-17 PROCEDURE — 97161 PT EVAL LOW COMPLEX 20 MIN: CPT

## 2022-11-17 PROCEDURE — 85027 COMPLETE CBC AUTOMATED: CPT

## 2022-11-17 PROCEDURE — 99285 EMERGENCY DEPT VISIT HI MDM: CPT

## 2022-11-17 PROCEDURE — 93321 DOPPLER ECHO F-UP/LMTD STD: CPT

## 2022-11-17 PROCEDURE — 93970 EXTREMITY STUDY: CPT

## 2022-11-17 PROCEDURE — 36600 WITHDRAWAL OF ARTERIAL BLOOD: CPT

## 2022-11-17 PROCEDURE — C1889: CPT

## 2022-11-17 PROCEDURE — 87637 SARSCOV2&INF A&B&RSV AMP PRB: CPT

## 2022-11-17 PROCEDURE — 78452 HT MUSCLE IMAGE SPECT MULT: CPT

## 2022-11-17 PROCEDURE — 36415 COLL VENOUS BLD VENIPUNCTURE: CPT

## 2022-11-17 PROCEDURE — 71045 X-RAY EXAM CHEST 1 VIEW: CPT

## 2022-11-17 PROCEDURE — C8929: CPT

## 2022-11-17 PROCEDURE — 86850 RBC ANTIBODY SCREEN: CPT

## 2022-11-17 PROCEDURE — 82550 ASSAY OF CK (CPK): CPT

## 2022-11-17 PROCEDURE — C1894: CPT

## 2022-11-17 PROCEDURE — 83735 ASSAY OF MAGNESIUM: CPT

## 2022-11-17 PROCEDURE — C1769: CPT

## 2022-11-17 PROCEDURE — U0005: CPT

## 2022-11-17 PROCEDURE — A9500: CPT

## 2022-11-17 PROCEDURE — 87640 STAPH A DNA AMP PROBE: CPT

## 2022-11-17 PROCEDURE — 85730 THROMBOPLASTIN TIME PARTIAL: CPT

## 2022-11-17 PROCEDURE — 84100 ASSAY OF PHOSPHORUS: CPT

## 2022-11-17 PROCEDURE — 93017 CV STRESS TEST TRACING ONLY: CPT

## 2022-11-17 PROCEDURE — 86900 BLOOD TYPING SEROLOGIC ABO: CPT

## 2022-11-17 PROCEDURE — 86901 BLOOD TYPING SEROLOGIC RH(D): CPT

## 2022-11-17 PROCEDURE — C1887: CPT

## 2022-11-17 PROCEDURE — 93005 ELECTROCARDIOGRAM TRACING: CPT

## 2022-11-17 PROCEDURE — 84443 ASSAY THYROID STIM HORMONE: CPT

## 2022-11-17 PROCEDURE — 93970 EXTREMITY STUDY: CPT | Mod: 26

## 2022-11-17 PROCEDURE — 93456 R HRT CORONARY ARTERY ANGIO: CPT

## 2022-11-17 PROCEDURE — 0225U NFCT DS DNA&RNA 21 SARSCOV2: CPT

## 2022-11-17 PROCEDURE — 83036 HEMOGLOBIN GLYCOSYLATED A1C: CPT

## 2022-11-17 PROCEDURE — 94660 CPAP INITIATION&MGMT: CPT

## 2022-11-17 PROCEDURE — 85379 FIBRIN DEGRADATION QUANT: CPT

## 2022-11-17 PROCEDURE — U0003: CPT

## 2022-11-17 PROCEDURE — 93308 TTE F-UP OR LMTD: CPT | Mod: 26

## 2022-11-17 PROCEDURE — 80048 BASIC METABOLIC PNL TOTAL CA: CPT

## 2022-11-17 PROCEDURE — 82803 BLOOD GASES ANY COMBINATION: CPT

## 2022-11-17 PROCEDURE — 85610 PROTHROMBIN TIME: CPT

## 2022-11-17 PROCEDURE — 80053 COMPREHEN METABOLIC PANEL: CPT

## 2022-11-17 PROCEDURE — 84484 ASSAY OF TROPONIN QUANT: CPT

## 2022-11-17 PROCEDURE — 71275 CT ANGIOGRAPHY CHEST: CPT | Mod: MA

## 2022-11-17 PROCEDURE — 80061 LIPID PANEL: CPT

## 2022-11-17 PROCEDURE — 93321 DOPPLER ECHO F-UP/LMTD STD: CPT | Mod: 26

## 2022-11-17 RX ORDER — ASPIRIN/CALCIUM CARB/MAGNESIUM 324 MG
1 TABLET ORAL
Qty: 30 | Refills: 0
Start: 2022-11-17 | End: 2022-12-16

## 2022-11-17 RX ORDER — BUMETANIDE 0.25 MG/ML
1 INJECTION INTRAMUSCULAR; INTRAVENOUS
Qty: 30 | Refills: 0
Start: 2022-11-17 | End: 2022-12-16

## 2022-11-17 RX ORDER — ALLOPURINOL 300 MG
2 TABLET ORAL
Qty: 60 | Refills: 0
Start: 2022-11-17 | End: 2022-12-16

## 2022-11-17 RX ORDER — METOPROLOL TARTRATE 50 MG
1 TABLET ORAL
Qty: 30 | Refills: 0
Start: 2022-11-17 | End: 2022-12-16

## 2022-11-17 RX ORDER — RIVAROXABAN 15 MG-20MG
1 KIT ORAL
Qty: 30 | Refills: 0
Start: 2022-11-17 | End: 2022-12-16

## 2022-11-17 RX ORDER — ATORVASTATIN CALCIUM 80 MG/1
1 TABLET, FILM COATED ORAL
Qty: 30 | Refills: 0
Start: 2022-11-17 | End: 2022-12-16

## 2022-11-17 RX ORDER — METOPROLOL TARTRATE 50 MG
1 TABLET ORAL
Qty: 0 | Refills: 0 | DISCHARGE

## 2022-11-17 RX ORDER — APIXABAN 2.5 MG/1
1 TABLET, FILM COATED ORAL
Qty: 60 | Refills: 0
Start: 2022-11-17 | End: 2022-12-16

## 2022-11-17 RX ORDER — RIVAROXABAN 15 MG-20MG
20 KIT ORAL
Refills: 0 | Status: DISCONTINUED | OUTPATIENT
Start: 2022-11-17 | End: 2022-11-17

## 2022-11-17 RX ADMIN — Medication 200 MILLIGRAM(S): at 12:14

## 2022-11-17 RX ADMIN — Medication 81 MILLIGRAM(S): at 12:17

## 2022-11-17 RX ADMIN — APIXABAN 5 MILLIGRAM(S): 2.5 TABLET, FILM COATED ORAL at 05:12

## 2022-11-17 RX ADMIN — RIVAROXABAN 20 MILLIGRAM(S): KIT at 18:57

## 2022-11-17 RX ADMIN — Medication 50 MILLIGRAM(S): at 05:12

## 2022-11-17 RX ADMIN — FINASTERIDE 5 MILLIGRAM(S): 5 TABLET, FILM COATED ORAL at 12:18

## 2022-11-17 RX ADMIN — BUMETANIDE 1 MILLIGRAM(S): 0.25 INJECTION INTRAMUSCULAR; INTRAVENOUS at 05:12

## 2022-11-17 NOTE — PROGRESS NOTE ADULT - PROVIDER SPECIALTY LIST ADULT
Cardiology
Cardiology
Nephrology
Pulmonology
Cardiology
Internal Medicine
Nephrology
Cardiology
Internal Medicine
Nephrology
Vascular Surgery
Internal Medicine

## 2022-11-17 NOTE — PROGRESS NOTE ADULT - ASSESSMENT
Patient is a 67y old  Male who presents with a chief complaint of Shortness of breath  "68 y/o male with hx of DVT ( two sepearte occasions ) was on eleiquis ( last used 6 months ago ) , AVR ( bovine ) ( repaired twice ?),  s/p PPM HTN, BPH, pacemaker, admitted with 2 day hx of SOB , exertional and worsened.  Pt presented to er. noticed with LE swelling as well. had CTA on 11/8  chest revealing no pulmonary embolism through the segmental branches. Several subsegmental branches cannot be accurately evaluated and noticed with CHF. pt under went coronary angiogram on 11/10 and noticed with high EDP. cr started to rise"  no fever or chills .  no N/V /cough   mild LE swelling   CT: No pulmonary embolism through the segmental branches. Several   subsegmental branches cannot be accurately evaluated.  Mild interstitial edema.  o acute changes on EKG reportedly   trop neg  (08 Nov 2022 15:06):    he says he has no underlying lung disease  he is from [place] and says he never had tuberculosis:  he has no CHARLI: and do not use oxygen at home:    he dd have dvt twice before:     11/16: The reason for hypoxia is not clear:  he has charli and is  using cpap but he desaturated while awake too : he has no pe and he is alerday on full AC: he is not a drinker also and has no cirrhosis  he does have a fistula in groin :? need to rule out shunting in the heart or lungs:  though seems doubtful ? Is fistula giving him hypoxia : can get vascular to see the flow though fistula to see if there is significant intermixing of blood:   or giving him high output heart failure : he has no underlying lung parenchyma disease:   dw team     11/17:     The reason for hypoxia is not clear:  he has charli and is  using cpap but he desaturated while awake too :  currently his o2 sao2 is 92 to 93%: he has no pe and he is already on full AC: he is not a drinker also and has no cirrhosis  he does have a fistula in groin :? need to rule out shunting in the heart or lungs:  though seems doubtful ? Is fistula giving him hypoxia : can get vascular to see the flow though fistula to see if there is significant intermixing of blood: (they did seem him before and were planning to do the surgery)  or giving him high output heart failure : he has no underlying lung parenchyma disease:   would suggest  get vascular and cards to rule out PFO :  oir shunting in lungs   now he is having rhinovirus infection: supportivecare  kaiden PMD

## 2022-11-17 NOTE — DISCHARGE NOTE NURSING/CASE MANAGEMENT/SOCIAL WORK - NSDCPNINST_GEN_ALL_CORE
Call and make follow up appointment with MD after discharged. Return to the nearest emergency room or call 911 for chest pain, difficulty breathing. or palpitations.

## 2022-11-17 NOTE — PROGRESS NOTE ADULT - CONVERSATION DETAILS
detailed discussion of MOLST form  with patient at bedside.     initially pt expressed he wishes to sign DNR/DNI however later changed his mind and did not wish to sign and would like to address as o p             30 minutes for advanced care planning discussion separate and in addition to the E&M service provided

## 2022-11-17 NOTE — DISCHARGE NOTE NURSING/CASE MANAGEMENT/SOCIAL WORK - PATIENT PORTAL LINK FT
You can access the FollowMyHealth Patient Portal offered by [facility] by registering at the following website: [email or web address]. By joining [facility]’s FollowMyHealth portal, you will also be able to view your health information using other applications (apps) compatible with our system.

## 2022-11-17 NOTE — PROGRESS NOTE ADULT - ASSESSMENT
ECHO 11/9/22:  Ef 73%: mild MS, fx bioprosthe avr, nl LV sys fx   mod pulm pressure   Stress test 11/9/22: abnl with evidence of infarct and shilo-infarct ischemia, LVEF 59%;   revealing hypokinesis of the basal inferior and basal septal walls and reduced systolic thickening of the basal to mid inferolateral, distal anterior, distal anteroseptal, and apical walls.   Nuclear Stress Test-Pharmacologic (06.03.15 @ 12:15) >  ------------------------------------------------------------------------  IMPRESSIONS:Normal Study  * Baseline ECG: Nonspecific ST-T wave abnormality.  * ECG Changes: No ischemic ST segment changes.  * Arrhythmia: None.  * Review of raw data shows: Minor motion artifact.  * The left ventricle was enlarged. There is a small, mild  defect in the apex that is mostly fixed and demonstrates  preserved wall motion suggestive of attenuation artifact.  * Post-stress gated wall motion analysis was performed  (LVEF = 62 %;LVEDV = 136 ml.) revealing paradoxical septal  motion consistent with a post-operative state.  ------------------------------------------------------------------------      a/p     66 y/o male with hx of ascending aortic aneurysm repair, DVT ( two sepearte occasions ) was on Eliquis ( last used 6 months ago ) , sp  AVR ( bovine ) ( repaired twice ?),  s/p PPM (medtronic), HTN, BPH,, admitted with 2 day hx of SOB     #acute on chronic DCHF, pulmonary edema  -cta chest No pulmonary embolism  -no ACS  -echo with nl LV sys fx, nl fxn bio avr   -stress test abnl with evidence of infarct and shilo-infarct ischemia, LVEF 59%;   revealing hypokinesis of the basal inferior and basal septal walls and reduced systolic thickening of the basal to mid inferolateral, distal anterior, distal anteroseptal, and apical walls  -s/p cath with luminal LCx/Ramus disease.  Mild proximal LAD disease.   -Severely elevated PASP in setting of markedly elevated filling pressures/wedge pressure. Severely elevated PASP likely secondary to increased LA pressure.   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

## 2022-11-17 NOTE — DISCHARGE NOTE NURSING/CASE MANAGEMENT/SOCIAL WORK - NSDCFUADDAPPT_GEN_ALL_CORE_FT
1. Please follow up with your primary care provider within 1-2 weeks of discharge  2. Please follow up with your cardiologist within 1-2 weeks of discharge  3. Monitor your kidney function - Xarelto dose needs to be adjusted if kidney function worsens.    APPTS ARE READY TO BE MADE: [x] YES    Best Family or Patient Contact (if needed):    Additional Information about above appointments (if needed):    1: Follow up with cardiologist - Dr. Segura on 11/21/22 Monday at 8:45AM  2:   3:     Other comments or requests:

## 2022-11-17 NOTE — PROGRESS NOTE ADULT - SUBJECTIVE AND OBJECTIVE BOX
CARDIOLOGY FOLLOW UP - Dr. Carrera  DATE OF SERVICE: 11/11/22     CC SOB this am   -hypotension noted   no cp       REVIEW OF SYSTEMS:  CONSTITUTIONAL: No fever, weight loss, or fatigue  RESPIRATORY: No cough, wheezing, chills or hemoptysis;   CARDIOVASCULAR: No chest pain, palpitations, passing out, dizziness, or leg swelling  GASTROINTESTINAL: No abdominal or epigastric pain. No nausea, vomiting, or hematemesis; No diarrhea or constipation. No melena or hematochezia.    PHYSICAL EXAM:  T(C): 36.8 (11-11-22 @ 10:46), Max: 37.2 (11-10-22 @ 18:30)  HR: 74 (11-11-22 @ 10:46) (65 - 87)  BP: 94/55 (11-11-22 @ 10:46) (91/45 - 131/71)  RR: 18 (11-11-22 @ 10:46) (16 - 20)  SpO2: 95% (11-11-22 @ 10:46) (91% - 96%)  Wt(kg): --  I&O's Summary    10 Nov 2022 07:01  -  11 Nov 2022 07:00  --------------------------------------------------------  IN: 0 mL / OUT: 1050 mL / NET: -1050 mL        Appearance: Normal	  Cardiovascular: Normal S1 S2,RR  Respiratory: crackles at base 	  Gastrointestinal:  Soft, Non-tender, + BS	  Extremities: +thrill to right groin/right wrist CDI no hematoma       Home Medications:  finasteride 5 mg oral tablet: 1 tab(s) orally once a day (10 Nov 2022 10:16)  metoprolol succinate 100 mg oral tablet, extended release: 1 tab(s) orally once a day (10 Nov 2022 10:16)  tamsulosin 0.4 mg oral capsule: 1 cap(s) orally 2 times a day (10 Nov 2022 10:16)  Vitamin C 1000 mg oral tablet: 1 tab(s) orally once a day (10 Nov 2022 10:16)  Vitamin D3 1000 intl units oral tablet: 1 tab(s) orally once a day (10 Nov 2022 10:16)      MEDICATIONS  (STANDING):  aspirin enteric coated 81 milliGRAM(s) Oral daily  atorvastatin 20 milliGRAM(s) Oral at bedtime  chlorhexidine 2% Cloths 1 Application(s) Topical <User Schedule>  finasteride 5 milliGRAM(s) Oral daily  furosemide    Tablet 20 milliGRAM(s) Oral daily  heparin  Infusion.  Unit(s)/Hr (18 mL/Hr) IV Continuous <Continuous>  metoprolol succinate  milliGRAM(s) Oral daily  tamsulosin 0.4 milliGRAM(s) Oral at bedtime      TELEMETRY: 	paced     ECG:  	  RADIOLOGY:   < from: US Duplex Arterial Lower Ext Ltd, Right (11.10.22 @ 21:08) >  FINDINGS: Targeted sonogram of theright groin demonstrates patency of   the common femoral artery and vein. There is a 2.5 cm pseudoaneurysm   arising from the common femoral artery. Fistulous communication of the   pseudoaneurysm sac to the greater saphenous vein demonstrates low   resistance pulsatile flow of high velocity. No associated soft tissue   hematoma is seen.    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        DIAGNOSTIC TESTING:  [ ] Echocardiogram:  [ ]  Catheterization:   Cardiac Catheterization (11.10.22 @ 15:24) >   1.    Arterial Access - Right Radial   2.    Diagnostic Coronary Angiography   3.    Venous Access - Right Femoral   4.    RHC     Indications:                Acute HFpEF   Exertional dyspnea   Abnormal stress test     Diagnostic Conclusions:     Acute onset new Heart Failure with preserved EF.   History of bio AVR x2, ascending aortic aneurysm repair, PPM.   Abnormal stress with ischemia.     Cath with luminal LCx/Ramus disease.   Mild proximal LAD disease.   No obstrcutive epicardial disease to account for ischemia on SPECT.   Severely elevated PASP in settingof markedly elevated filling  pressures/wedge pressure.  Severely elevated PASP likely secondary to increased LA pressure.     Continue IV diuresis.   Image right femoral artery (incidental finding of bounding/pulsatile  thrill while obtaining venousaccess)      < end of copied text >    [ ] Stress Test:    OTHER: 	    LABS:	 	    Creatine Kinase, Serum: 83 U/L [30 - 200] (11-11 @ 07:59)  CKMB Units: 2.0 ng/mL [0.0 - 6.7] (11-11 @ 07:59)  Troponin T, High Sensitivity Result: 60 ng/L [0 - 51] (11-11 @ 07:59)  Creatine Kinase, Serum: 92 U/L [30 - 200] (11-08 @ 11:02)  CKMB Units: 3.0 ng/mL [0.0 - 6.7] (11-08 @ 11:02)  Troponin T, High Sensitivity Result: 23 ng/L [0 - 51] (11-08 @ 11:02)  Troponin T, High Sensitivity Result: 25 ng/L [0 - 51] (11-08 @ 05:51)                          13.8   9.98  )-----------( 200      ( 11 Nov 2022 07:59 )             41.4     11-11    142  |  102  |  38<H>  ----------------------------<  165<H>  3.9   |  26  |  1.21    Ca    9.3      11 Nov 2022 07:59  Phos  4.0     11-11  Mg     2.0     11-11      PTT - ( 11 Nov 2022 07:59 )  PTT:43.7 sec        
CARDIOLOGY FOLLOW UP - Dr. Carrera  DATE OF SERVICE: 11/15/22     CC no cp  or sob   feels better   orthopnea resolved        REVIEW OF SYSTEMS:  CONSTITUTIONAL: No fever, weight loss, or fatigue  RESPIRATORY: No cough, wheezing, chills or hemoptysis; No Shortness of Breath  CARDIOVASCULAR: No chest pain, palpitations, passing out, dizziness, or leg swelling  GASTROINTESTINAL: No abdominal or epigastric pain. No nausea, vomiting, or hematemesis; No diarrhea or constipation. No melena or hematochezia.  VASCULAR: No edema     PHYSICAL EXAM:  T(C): 36.9 (11-15-22 @ 10:46), Max: 36.9 (11-15-22 @ 10:46)  HR: 77 (11-15-22 @ 10:46) (61 - 83)  BP: 102/58 (11-15-22 @ 10:46) (102/56 - 138/63)  RR: 18 (11-15-22 @ 10:46) (18 - 18)  SpO2: 92% (11-15-22 @ 10:46) (90% - 95%)  Wt(kg): --  I&O's Summary    14 Nov 2022 07:01  -  15 Nov 2022 07:00  --------------------------------------------------------  IN: 1110 mL / OUT: 1925 mL / NET: -815 mL        Appearance: Normal	  Cardiovascular: Normal S1 S2,RR  Respiratory: diminished   Gastrointestinal:  Soft, Non-tender, + BS	  Extremities: Normal range of motion, No clubbing, cyanosis or edema      Home Medications:  finasteride 5 mg oral tablet: 1 tab(s) orally once a day (10 Nov 2022 10:16)  metoprolol succinate 100 mg oral tablet, extended release: 1 tab(s) orally once a day (10 Nov 2022 10:16)  tamsulosin 0.4 mg oral capsule: 1 cap(s) orally 2 times a day (10 Nov 2022 10:16)  Vitamin C 1000 mg oral tablet: 1 tab(s) orally once a day (10 Nov 2022 10:16)  Vitamin D3 1000 intl units oral tablet: 1 tab(s) orally once a day (10 Nov 2022 10:16)      MEDICATIONS  (STANDING):  allopurinol 200 milliGRAM(s) Oral daily  apixaban 5 milliGRAM(s) Oral every 12 hours  aspirin enteric coated 81 milliGRAM(s) Oral daily  atorvastatin 20 milliGRAM(s) Oral at bedtime  chlorhexidine 2% Cloths 1 Application(s) Topical <User Schedule>  finasteride 5 milliGRAM(s) Oral daily  metoprolol succinate ER 50 milliGRAM(s) Oral daily  tamsulosin 0.4 milliGRAM(s) Oral at bedtime      TELEMETRY: nsr 	    ECG:  	  RADIOLOGY:   DIAGNOSTIC TESTING:  [ ] Echocardiogram:  [ ]  Catheterization:  [ ] Stress Test:    OTHER: 	    LABS:	 	    Creatine Kinase, Serum: 83 U/L [30 - 200] (11-11 @ 07:59)  CKMB Units: 2.0 ng/mL [0.0 - 6.7] (11-11 @ 07:59)  Troponin T, High Sensitivity Result: 60 ng/L [0 - 51] (11-11 @ 07:59)                          15.4   9.76  )-----------( 226      ( 15 Nov 2022 07:31 )             45.9     11-15    141  |  93<L>  |  66<H>  ----------------------------<  131<H>  3.4<L>   |  31  |  1.84<H>    Ca    10.1      15 Nov 2022 07:29  Mg     2.2     11-15    TPro  8.4<H>  /  Alb  4.3  /  TBili  1.0  /  DBili  x   /  AST  26  /  ALT  54<H>  /  AlkPhos  79  11-14            
CARDIOLOGY FOLLOW UP - Dr. Carrera  DATE OF SERVICE: 11/16/22     CC no cp   SOB at times  events noted-  14 beats WCT per chart event_ oxygen was also desaturating down to low 80s while on CPAP . Patient's CPAP was not sealed at the time; . However, patient was desatting to low 80s while awake and off CPAP. O2 improved to mid 90s while on 2L nasal cannula      REVIEW OF SYSTEMS:  CONSTITUTIONAL: No fever, weight loss, or fatigue  RESPIRATORY: No cough, wheezing, chills or hemoptysis; No Shortness of Breath  CARDIOVASCULAR: No chest pain, palpitations, passing out, dizziness, or leg swelling  GASTROINTESTINAL: No abdominal or epigastric pain. No nausea, vomiting, or hematemesis; No diarrhea or constipation. No melena or hematochezia.      PHYSICAL EXAM:  T(C): 36.7 (11-16-22 @ 10:53), Max: 36.9 (11-16-22 @ 00:19)  HR: 60 (11-16-22 @ 10:53) (60 - 82)  BP: 96/56 (11-16-22 @ 10:53) (96/56 - 118/68)  RR: 18 (11-16-22 @ 10:53) (16 - 20)  SpO2: 93% (11-16-22 @ 10:53) (91% - 95%)  Wt(kg): --  I&O's Summary    15 Nov 2022 07:01  -  16 Nov 2022 07:00  --------------------------------------------------------  IN: 720 mL / OUT: 1750 mL / NET: -1030 mL        Appearance: Normal	  Cardiovascular: Normal S1 S2,RRR, No JVD, No murmurs  Respiratory:  diminished   Gastrointestinal:  Soft, Non-tender, + BS	  Extremities: Normal range of motion, No clubbing, cyanosis or edema      Home Medications:  finasteride 5 mg oral tablet: 1 tab(s) orally once a day (10 Nov 2022 10:16)  metoprolol succinate 100 mg oral tablet, extended release: 1 tab(s) orally once a day (10 Nov 2022 10:16)  tamsulosin 0.4 mg oral capsule: 1 cap(s) orally 2 times a day (10 Nov 2022 10:16)  Vitamin C 1000 mg oral tablet: 1 tab(s) orally once a day (10 Nov 2022 10:16)  Vitamin D3 1000 intl units oral tablet: 1 tab(s) orally once a day (10 Nov 2022 10:16)      MEDICATIONS  (STANDING):  allopurinol 200 milliGRAM(s) Oral daily  apixaban 5 milliGRAM(s) Oral every 12 hours  aspirin enteric coated 81 milliGRAM(s) Oral daily  atorvastatin 20 milliGRAM(s) Oral at bedtime  buMETAnide 1 milliGRAM(s) Oral daily  chlorhexidine 2% Cloths 1 Application(s) Topical <User Schedule>  finasteride 5 milliGRAM(s) Oral daily  metoprolol succinate ER 50 milliGRAM(s) Oral daily  tamsulosin 0.4 milliGRAM(s) Oral at bedtime      TELEMETRY: nsr/paflutter hr 60  last night  14 beats WCT	    ECG:  	  RADIOLOGY:    Xray Chest 1 View- PORTABLE-Urgent (Xray Chest 1 View- PORTABLE-Urgent .) (11.16.22 @ 07:38) >  FINDINGS:  LINES/TUBES/SUPPORT DEVICES: Left chest wall pacemaker.    LUNGS: Mild bibasilar atelectasis.    PLEURA: No pleural effusion or pneumothorax.    HEART AND MEDIASTINUM: Heart size cannot be accurately assessed.   Sternotomy wires.    BONES: No acute bony abnormality.    IMPRESSION:  Mild bibasilar atelectasis.    --- End of Report ---      < end of copied text >    DIAGNOSTIC TESTING:  [ ] Echocardiogram:  [ ]  Catheterization:  [ ] Stress Test:    OTHER: 	    LABS:	 	    Creatine Kinase, Serum: 83 U/L [30 - 200] (11-11 @ 07:59)  CKMB Units: 2.0 ng/mL [0.0 - 6.7] (11-11 @ 07:59)  Troponin T, High Sensitivity Result: 60 ng/L [0 - 51] (11-11 @ 07:59)                          13.6   8.62  )-----------( 199      ( 16 Nov 2022 06:35 )             40.9     11-16    142  |  100  |  62<H>  ----------------------------<  117<H>  3.6   |  31  |  1.44<H>    Ca    9.3      16 Nov 2022 06:35  Phos  3.6     11-16  Mg     2.4     11-16              
Date of service: 11-10-22 @ 20:52      Patient is a 67y old  Male who presents with a chief complaint of                                                              INTERVAL HPI/OVERNIGHT EVENTS:    REVIEW OF SYSTEMS:     CONSTITUTIONAL: No weakness, fevers or chills  EYES/ENT: No visual changes , no ear ache   NECK: No pain or stiffness  RESPIRATORY: No cough, wheezing,  No shortness of breath  CARDIOVASCULAR: No chest pain or palpitations  GASTROINTESTINAL: No abdominal pain  . No nausea, vomiting, or hematemesis; No diarrhea or constipation. No melena or hematochezia.  GENITOURINARY: No dysuria, frequency or hematuria  NEUROLOGICAL: No numbness or weakness  SKIN: No itching, burning, rashes, or lesions                                                                                                                                                                                                                                                                                 Medications:  MEDICATIONS  (STANDING):  apixaban 5 milliGRAM(s) Oral two times a day  aspirin enteric coated 81 milliGRAM(s) Oral daily  atorvastatin 20 milliGRAM(s) Oral at bedtime  chlorhexidine 2% Cloths 1 Application(s) Topical <User Schedule>  finasteride 5 milliGRAM(s) Oral daily  furosemide   Injectable 40 milliGRAM(s) IV Push two times a day  metoprolol succinate  milliGRAM(s) Oral daily  tamsulosin 0.4 milliGRAM(s) Oral at bedtime    MEDICATIONS  (PRN):       Allergies    No Known Allergies    Intolerances      Vital Signs Last 24 Hrs  T(C): 36.8 (10 Nov 2022 19:30), Max: 37.2 (10 Nov 2022 18:30)  T(F): 98.2 (10 Nov 2022 19:30), Max: 99 (10 Nov 2022 18:30)  HR: 74 (10 Nov 2022 19:30) (68 - 87)  BP: 109/68 (10 Nov 2022 19:30) (101/55 - 131/71)  BP(mean): --  RR: 20 (10 Nov 2022 19:30) (16 - 20)  SpO2: 94% (10 Nov 2022 19:30) (91% - 98%)    Parameters below as of 10 Nov 2022 19:30  Patient On (Oxygen Delivery Method): nasal cannula  O2 Flow (L/min): 3    CAPILLARY BLOOD GLUCOSE          11-10 @ 07:01  -  11-10 @ 20:52  --------------------------------------------------------  IN: 0 mL / OUT: 500 mL / NET: -500 mL      Physical Exam:    Daily     Daily Weight in k.8 (10 Nov 2022 08:05)  General:  Well appearing, NAD, not cachetic  HEENT:  Nonicteric, PERRLA  CV:  RRR, S1S2   Lungs:  CTA B/L, no wheezes, rales, rhonchi  Abdomen:  Soft, non-tender, no distended, positive BS  Extremities:  2+ pulses, no c/c, no edema  Skin:  Warm and dry, no rashes  :  No crystal  Neuro:  AAOx3, non-focal, grossly intact                                                                                                                                                                                                                                                                                                LABS:                               13.4   10.84 )-----------( 153      ( 2022 06:55 )             40.1                          141  |  105  |  26<H>  ----------------------------<  93  4.3   |  25  |  0.92    Ca    9.2      2022 06:55    TPro  7.0  /  Alb  3.8  /  TBili  1.0  /  DBili  x   /  AST  58<H>  /  ALT  126<H>  /  AlkPhos  84                         RADIOLOGY & ADDITIONAL TESTS         I personally reviewed: [  ]EKG   [  ]CXR    [  ] CT      A/P:         Discussed with :     Aarti consultants' Notes   Time spent :  
Date of service: 11-15-22 @ 22:43      Patient is a 67y old  Male who presents with a chief complaint of Shortness of breath  "68 y/o male with hx of DVT ( two sepearte occasions ) was on eleiquis ( last used 6 months ago ) , AVR ( bovine ) ( repaired twice ?),  s/p PPM HTN, BPH, pacemaker, admitted with 2 day hx of SOB , exertional and worsened.  Pt presented to er. noticed with LE swelling as well. had CTA on   chest revealing no pulmonary embolism through the segmental branches. Several subsegmental branches cannot be accurately evaluated and noticed with CHF. pt under went coronary angiogram on 11/10 and noticed with high EDP. cr started to rise"   (2022 11:20)                                                               INTERVAL HPI/OVERNIGHT EVENTS:    REVIEW OF SYSTEMS:     CONSTITUTIONAL: No weakness, fevers or chills  EYES/ENT: No visual changes , no ear ache   NECK: No pain or stiffness  RESPIRATORY: No cough, wheezing,  No shortness of breath  CARDIOVASCULAR: No chest pain or palpitations  GASTROINTESTINAL: No abdominal pain  . No nausea, vomiting, or hematemesis; No diarrhea or constipation. No melena or hematochezia.  GENITOURINARY: No dysuria, frequency or hematuria  NEUROLOGICAL: No numbness or weakness  SKIN: No itching, burning, rashes, or lesions                                                                                                                                                                                                                                                                                 Medications:  MEDICATIONS  (STANDING):  allopurinol 200 milliGRAM(s) Oral daily  apixaban 5 milliGRAM(s) Oral every 12 hours  aspirin enteric coated 81 milliGRAM(s) Oral daily  atorvastatin 20 milliGRAM(s) Oral at bedtime  buMETAnide 1 milliGRAM(s) Oral daily  chlorhexidine 2% Cloths 1 Application(s) Topical <User Schedule>  finasteride 5 milliGRAM(s) Oral daily  metoprolol succinate ER 50 milliGRAM(s) Oral daily  tamsulosin 0.4 milliGRAM(s) Oral at bedtime    MEDICATIONS  (PRN):       Allergies    No Known Allergies    Intolerances      Vital Signs Last 24 Hrs  T(C): 36.6 (15 Nov 2022 20:07), Max: 36.9 (15 Nov 2022 10:46)  T(F): 97.8 (15 Nov 2022 20:07), Max: 98.5 (15 Nov 2022 10:46)  HR: 72 (15 Nov 2022 20:07) (61 - 83)  BP: 97/57 (15 Nov 2022 20:07) (97/57 - 103/62)  BP(mean): --  RR: 20 (15 Nov 2022 20:07) (18 - 20)  SpO2: 93% (15 Nov 2022 20:07) (90% - 93%)    Parameters below as of 15 Nov 2022 20:07  Patient On (Oxygen Delivery Method): room air      CAPILLARY BLOOD GLUCOSE          14 @ 07:  -  11-15 @ 07:00  --------------------------------------------------------  IN: 1110 mL / OUT: 1925 mL / NET: -815 mL    11-15 @ 07:01  -  11-15 @ 22:43  --------------------------------------------------------  IN: 720 mL / OUT: 1400 mL / NET: -680 mL      Physical Exam:    Daily     Daily Weight in k.4 (15 Nov 2022 07:31)  General:  Well appearing, NAD, not cachetic  HEENT:  Nonicteric, PERRLA  CV:  RRR, S1S2   Lungs:  CTA B/L, no wheezes, rales, rhonchi  Abdomen:  Soft, non-tender, no distended, positive BS  Extremities:  no edema   Neuro:  AAOx3, non-focal, grossly intact                                                                                                                                                                                                                                                                                                LABS:                               15.4   9.76  )-----------( 226      ( 15 Nov 2022 07:31 )             45.9                      11-15    141  |  93<L>  |  66<H>  ----------------------------<  131<H>  3.4<L>   |  31  |  1.84<H>    Ca    10.1      15 Nov 2022 07:29  Mg     2.2     15    TPro  8.4<H>  /  Alb  4.3  /  TBili  1.0  /  DBili  x   /  AST  26  /  ALT  54<H>  /  AlkPhos  79  11-14                       RADIOLOGY & ADDITIONAL TESTS         I personally reviewed: [  ]EKG   [  ]CXR    [  ] CT      A/P:         Discussed with :     Aarti consultants' Notes   Time spent :  
Date of service: 22 @ 17:18      Patient is a 67y old  Male who presents with a chief complaint of hypoxia (2022 14:30)                                                               INTERVAL HPI/OVERNIGHT EVENTS:  hypoxia overnight     REVIEW OF SYSTEMS:     CONSTITUTIONAL: No weakness, fevers or chills  EYES/ENT: No visual changes , no ear ache   NECK: No pain or stiffness  RESPIRATORY:  shortness of breath  CARDIOVASCULAR: No chest pain or palpitations  GASTROINTESTINAL: No abdominal pain  . No nausea, vomiting, or hematemesis; No diarrhea or constipation. No melena or hematochezia.  GENITOURINARY: No dysuria, frequency or hematuria  NEUROLOGICAL: No numbness or weakness                                                                                                                                                                                                                                                                                 Medications:  MEDICATIONS  (STANDING):  allopurinol 200 milliGRAM(s) Oral daily  apixaban 5 milliGRAM(s) Oral every 12 hours  aspirin enteric coated 81 milliGRAM(s) Oral daily  atorvastatin 20 milliGRAM(s) Oral at bedtime  buMETAnide 1 milliGRAM(s) Oral daily  chlorhexidine 2% Cloths 1 Application(s) Topical <User Schedule>  finasteride 5 milliGRAM(s) Oral daily  metoprolol succinate ER 50 milliGRAM(s) Oral daily  tamsulosin 0.4 milliGRAM(s) Oral at bedtime    MEDICATIONS  (PRN):       Allergies    No Known Allergies    Intolerances      Vital Signs Last 24 Hrs  T(C): 36.7 (2022 10:53), Max: 36.9 (2022 00:19)  T(F): 98.1 (2022 10:53), Max: 98.4 (2022 00:19)  HR: 78 (2022 14:39) (60 - 78)  BP: 96/56 (2022 10:53) (96/56 - 118/68)  BP(mean): --  RR: 18 (2022 10:53) (16 - 20)  SpO2: 93% (2022 14:39) (91% - 95%)    Parameters below as of 2022 10:53  Patient On (Oxygen Delivery Method): room air      CAPILLARY BLOOD GLUCOSE          11-15 @ 07:01  -  11-16 @ 07:00  --------------------------------------------------------  IN: 720 mL / OUT: 1750 mL / NET: -1030 mL     @ 07:01  -   @ 17:18  --------------------------------------------------------  IN: 480 mL / OUT: 600 mL / NET: -120 mL      Physical Exam:    Daily     Daily Weight in k.3 (2022 07:42)  General:  Well appearing, NAD, not cachetic  HEENT:  Nonicteric, PERRLA  CV:  RRR, S1S2   Lungs:  CTA B/L, no wheezes, rales, rhonchi  Abdomen:  Soft, non-tender, no distended, positive BS  Extremities: no edema   Neuro:  AAOx3, non-focal, grossly intact                                                                                                                                                                                                                                                                                                LABS:                               13.6   8.62  )-----------( 199      ( 2022 06:35 )             40.9                          142  |  100  |  62<H>  ----------------------------<  117<H>  3.6   |  31  |  1.44<H>    Ca    9.3      2022 06:35  Phos  3.6       Mg     2.4                              RADIOLOGY & ADDITIONAL TESTS         I personally reviewed: [  ]EKG   [  ]CXR    [  ] CT      A/P:         Discussed with :     Aarti consultants' Notes   Time spent :  
Date of service: 22 @ 22:09      Patient is a 67y old  Male who presents with a chief complaint of Shortness of breath  "68 y/o male with hx of DVT ( two sepearte occasions ) was on eleiquis ( last used 6 months ago ) , AVR ( bovine ) ( repaired twice ?),  s/p PPM HTN, BPH, pacemaker, admitted with 2 day hx of SOB , exertional and worsened.  Pt presented to er. noticed with LE swelling as well. had CTA on   chest revealing no pulmonary embolism through the segmental branches. Several subsegmental branches cannot be accurately evaluated and noticed with CHF. pt under went coronary angiogram on 11/10 and noticed with high EDP. cr started to rise"   (2022 11:20)                                                               INTERVAL HPI/OVERNIGHT EVENTS:    REVIEW OF SYSTEMS:     CONSTITUTIONAL: No weakness, fevers or chills  EYES/ENT: No visual changes , no ear ache   NECK: No pain or stiffness  RESPIRATORY: No cough, wheezing,  No shortness of breath  CARDIOVASCULAR: No chest pain or palpitations  GASTROINTESTINAL: No abdominal pain  . No nausea, vomiting, or hematemesis; No diarrhea or constipation. No melena or hematochezia.  GENITOURINARY: No dysuria, frequency or hematuria  NEUROLOGICAL: No numbness or weakness  SKIN: No itching, burning, rashes, or lesions                                                                                                                                                                                                                                                                                 Medications:  MEDICATIONS  (STANDING):  allopurinol 200 milliGRAM(s) Oral daily  apixaban 5 milliGRAM(s) Oral every 12 hours  aspirin enteric coated 81 milliGRAM(s) Oral daily  atorvastatin 20 milliGRAM(s) Oral at bedtime  chlorhexidine 2% Cloths 1 Application(s) Topical <User Schedule>  finasteride 5 milliGRAM(s) Oral daily  metoprolol succinate ER 50 milliGRAM(s) Oral daily  tamsulosin 0.4 milliGRAM(s) Oral at bedtime    MEDICATIONS  (PRN):       Allergies    No Known Allergies    Intolerances      Vital Signs Last 24 Hrs  T(C): 36.7 (2022 20:16), Max: 36.9 (2022 04:30)  T(F): 98.1 (2022 20:16), Max: 98.4 (2022 04:30)  HR: 81 (2022 20:16) (70 - 82)  BP: 138/63 (2022 20:16) (110/59 - 138/63)  BP(mean): --  RR: 18 (2022 20:16) (18 - 18)  SpO2: 90% (2022 20:16) (90% - 95%)    Parameters below as of 2022 20:16  Patient On (Oxygen Delivery Method): room air      CAPILLARY BLOOD GLUCOSE           @ 07: @ 07:00  --------------------------------------------------------  IN: 300 mL / OUT: 3450 mL / NET: -3150 mL     @ 07: @ 22:09  --------------------------------------------------------  IN: 1110 mL / OUT: 1300 mL / NET: -190 mL      Physical Exam:    Daily     Daily Weight in k.2 (2022 08:40)  General:  Well appearing, NAD, not cachetic  HEENT:  Nonicteric, PERRLA  CV:  RRR, S1S2   Lungs:  CTA B/L, no wheezes, rales, rhonchi  Abdomen:  Soft, non-tender, no distended, positive BS  Extremities:  2+ pulses, no c/c, no edema  Skin:  Warm and dry, no rashes  :  No crystal  Neuro:  AAOx3, non-focal, grossly intact                                                                                                                                                                                                                                                                                                LABS:                                139  |  91<L>  |  64<H>  ----------------------------<  179<H>  4.1   |  32<H>  |  1.75<H>    Ca    10.2      2022 17:45  Mg     2.0         TPro  8.4<H>  /  Alb  4.3  /  TBili  1.0  /  DBili  x   /  AST  26  /  ALT  54<H>  /  AlkPhos  79  11-14                       RADIOLOGY & ADDITIONAL TESTS         I personally reviewed: [  ]EKG   [  ]CXR    [  ] CT      A/P:         Discussed with :     Aarti consultants' Notes   Time spent :  
Date of service: 22 @ 23:52      Patient is a 67y old  Male who presents with a chief complaint of                                                              INTERVAL HPI/OVERNIGHT EVENTS:    REVIEW OF SYSTEMS:     CONSTITUTIONAL: No weakness, fevers or chills  EYES/ENT: No visual changes , no ear ache   NECK: No pain or stiffness  RESPIRATORY: No cough, wheezing,  No shortness of breath  CARDIOVASCULAR: No chest pain or palpitations  GASTROINTESTINAL: No abdominal pain  . No nausea, vomiting, or hematemesis; No diarrhea or constipation. No melena or hematochezia.  GENITOURINARY: No dysuria, frequency or hematuria  NEUROLOGICAL: No numbness or weakness  SKIN: No itching, burning, rashes, or lesions                                                                                                                                                                                                                                                                                 Medications:  MEDICATIONS  (STANDING):  apixaban 5 milliGRAM(s) Oral every 12 hours  aspirin enteric coated 81 milliGRAM(s) Oral daily  atorvastatin 20 milliGRAM(s) Oral at bedtime  buMETAnide Infusion 0.5 mG/Hr (2.5 mL/Hr) IV Continuous <Continuous>  chlorhexidine 2% Cloths 1 Application(s) Topical <User Schedule>  finasteride 5 milliGRAM(s) Oral daily  metoprolol succinate ER 50 milliGRAM(s) Oral daily  tamsulosin 0.4 milliGRAM(s) Oral at bedtime    MEDICATIONS  (PRN):       Allergies    No Known Allergies    Intolerances      Vital Signs Last 24 Hrs  T(C): 36.5 (2022 20:57), Max: 36.8 (2022 05:01)  T(F): 97.7 (2022 20:57), Max: 98.3 (2022 05:01)  HR: 79 (2022 23:40) (61 - 90)  BP: 119/67 (2022 20:57) (94/54 - 131/62)  BP(mean): --  RR: 18 (2022 20:57) (18 - 18)  SpO2: 94% (2022 23:40) (87% - 97%)    Parameters below as of 2022 20:57  Patient On (Oxygen Delivery Method): nasal cannula  O2 Flow (L/min): 2    CAPILLARY BLOOD GLUCOSE           @ 07:01  -   @ 07:00  --------------------------------------------------------  IN: 195 mL / OUT: 3925 mL / NET: -3730 mL     @ 07:01   @ 23:52  --------------------------------------------------------  IN: 30 mL / OUT:  mL / NET: - mL      Physical Exam:    Daily     Daily Weight in k.3 (2022 08:34)  General:  Well appearing, NAD, not cachetic  HEENT:  Nonicteric, PERRLA  CV:  RRR, S1S2   Lungs:  CTA B/L, no wheezes, rales, rhonchi  Abdomen:  Soft, non-tender, no distended, positive BS  Extremities:  2+ pulses, no c/c, no edema  Skin:  Warm and dry, no rashes  :  No crystal  Neuro:  AAOx3, non-focal, grossly intact                                                                                                                                                                                                                                                                                                LABS:                                139  |  94<L>  |  49<H>  ----------------------------<  120<H>  3.6   |  32<H>  |  1.56<H>    Ca    9.7      2022 17:55  Mg     2.0         TPro  7.4  /  Alb  3.8  /  TBili  1.1  /  DBili  x   /  AST  16  /  ALT  51<H>  /  AlkPhos  71                         RADIOLOGY & ADDITIONAL TESTS         I personally reviewed: [  ]EKG   [  ]CXR    [  ] CT      A/P:         Discussed with :     Aarti consultants' Notes   Time spent :  
GENERAL SURGERY PROGRESS NOTE    SUBJECTIVE  Patient seen and examined. No acute events overnight.      OBJECTIVE    PHYSICAL EXAM  General: NAD, resting comfortably  HEENT: NC/AT, EOMI, normal hearing, no oral lesions, no LAD, neck supple  Pulmonary: normal resp effort, CTA-B  Cardiovascular: NSR, no murmurs  Abdominal: soft, ND/NT, no organomegaly  Extremities: WWP, normal strength, no clubbing/cyanosis/edema. Palpable DP/PT bilaterally. R groin with pulsation.   Neuro: A/O x 3, CNs II-XII grossly intact, normal sensation, no focal deficits    T(C): 36.8 (11-11-22 @ 10:46), Max: 37.2 (11-10-22 @ 18:30)  HR: 74 (11-11-22 @ 10:46) (65 - 87)  BP: 94/55 (11-11-22 @ 10:46) (91/45 - 123/67)  RR: 18 (11-11-22 @ 10:46) (16 - 20)  SpO2: 95% (11-11-22 @ 10:46) (92% - 97%)    11-10-22 @ 07:01  -  11-11-22 @ 07:00  --------------------------------------------------------  IN: 0 mL / OUT: 1050 mL / NET: -1050 mL        MEDICATIONS  aspirin enteric coated 81 milliGRAM(s) Oral daily  atorvastatin 20 milliGRAM(s) Oral at bedtime  chlorhexidine 2% Cloths 1 Application(s) Topical <User Schedule>  finasteride 5 milliGRAM(s) Oral daily  furosemide   Injectable 40 milliGRAM(s) IV Push two times a day  heparin   Injectable 8500 Unit(s) IV Push every 6 hours PRN  heparin   Injectable 4000 Unit(s) IV Push every 6 hours PRN  heparin  Infusion.  Unit(s)/Hr IV Continuous <Continuous>  tamsulosin 0.4 milliGRAM(s) Oral at bedtime      LABS                        13.8   9.98  )-----------( 200      ( 11 Nov 2022 07:59 )             41.4     11-11    142  |  102  |  38<H>  ----------------------------<  165<H>  3.9   |  26  |  1.21    Ca    9.3      11 Nov 2022 07:59  Phos  4.0     11-11  Mg     2.0     11-11      PTT - ( 11 Nov 2022 07:59 )  PTT:43.7 sec      RADIOLOGY & ADDITIONAL STUDIES
[facility]   [phone]  RENAL FOLLOW UP NOTE  --------------------------------------------------------------------------------  Chief Complaint:    24 hour events/subjective:    patient seen and examined with Dr. Forbes  states breathing is better    PAST HISTORY  --------------------------------------------------------------------------------  No significant changes to PMH, PSH, FHx, SHx, unless otherwise noted    ALLERGIES & MEDICATIONS  --------------------------------------------------------------------------------  Allergies    No Known Allergies    Intolerances      Standing Inpatient Medications  allopurinol 200 milliGRAM(s) Oral daily  apixaban 5 milliGRAM(s) Oral every 12 hours  aspirin enteric coated 81 milliGRAM(s) Oral daily  atorvastatin 20 milliGRAM(s) Oral at bedtime  buMETAnide 1 milliGRAM(s) Oral daily  chlorhexidine 2% Cloths 1 Application(s) Topical <User Schedule>  finasteride 5 milliGRAM(s) Oral daily  metoprolol succinate ER 50 milliGRAM(s) Oral daily  tamsulosin 0.4 milliGRAM(s) Oral at bedtime    PRN Inpatient Medications      REVIEW OF SYSTEMS  --------------------------------------------------------------------------------    Gen: denies fevers/chills,  CVS: denies chest pain/palpitations  Resp: denies SOB/Cough  GI: Denies N/V/Abd pain  : Denies dysuria/oliguria/hematuria    VITALS/PHYSICAL EXAM  --------------------------------------------------------------------------------  T(C): 36.9 (11-15-22 @ 10:46), Max: 36.9 (11-15-22 @ 10:46)  HR: 77 (11-15-22 @ 10:46) (61 - 83)  BP: 102/58 (11-15-22 @ 10:46) (102/56 - 138/63)  RR: 18 (11-15-22 @ 10:46) (18 - 18)  SpO2: 92% (11-15-22 @ 10:46) (90% - 95%)  Wt(kg): --        11-14-22 @ 07:01  -  11-15-22 @ 07:00  --------------------------------------------------------  IN: 1110 mL / OUT: 1925 mL / NET: -815 mL    11-15-22 @ 07:01  -  11-15-22 @ 14:39  --------------------------------------------------------  IN: 720 mL / OUT: 300 mL / NET: 420 mL      Physical Exam:  	              Gen: Non toxic comfortable appearing   	Pulm: decrease bs  no rales or ronchi or wheezing  	CV: No JVD. RRR, S1S2; no rub  	Abd: +BS, soft, nontender/nondistended  	UE: Warm, no cyanosis  no clubbing,  no edema; no asterixis  	LE: Warm, no cyanosis  no clubbing, no   edema  	Neuro: alert and oriented. speech coherent     LABS/STUDIES  --------------------------------------------------------------------------------              15.4   9.76  >-----------<  226      [11-15-22 @ 07:31]              45.9     141  |  93  |  66  ----------------------------<  131      [11-15-22 @ 07:29]  3.4   |  31  |  1.84        Ca     10.1     [11-15-22 @ 07:29]      Mg     2.2     [11-15-22 @ 07:29]    TPro  8.4  /  Alb  4.3  /  TBili  1.0  /  DBili  x   /  AST  26  /  ALT  54  /  AlkPhos  79  [11-14-22 @ 06:13]        Uric acid 14.3      [11-14-22 @ 06:13]    Creatinine Trend:  SCr 1.84 [11-15 @ 07:29]  SCr 1.75 [11-14 @ 17:45]  SCr 1.72 [11-14 @ 06:13]  SCr 1.56 [11-13 @ 17:55]  SCr 1.53 [11-13 @ 05:58]          TSH 2.86      [11-10-22 @ 06:58]  Lipid: chol 167, , HDL 52, LDL --      [11-10-22 @ 06:55]  
[facility]   [phone]  RENAL FOLLOW UP NOTE  --------------------------------------------------------------------------------  Chief Complaint:    24 hour events/subjective:    seen earlier   states orthopnea symptoms improving     PAST HISTORY  --------------------------------------------------------------------------------  No significant changes to PMH, PSH, FHx, SHx, unless otherwise noted    ALLERGIES & MEDICATIONS  --------------------------------------------------------------------------------  Allergies    No Known Allergies    Intolerances      Standing Inpatient Medications  apixaban 5 milliGRAM(s) Oral every 12 hours  aspirin enteric coated 81 milliGRAM(s) Oral daily  atorvastatin 20 milliGRAM(s) Oral at bedtime  chlorhexidine 2% Cloths 1 Application(s) Topical <User Schedule>  finasteride 5 milliGRAM(s) Oral daily  metoprolol succinate ER 50 milliGRAM(s) Oral daily  tamsulosin 0.4 milliGRAM(s) Oral at bedtime    PRN Inpatient Medications      REVIEW OF SYSTEMS  --------------------------------------------------------------------------------    Gen: denies  fevers/chills,  CVS: denies chest pain/palpitations  Resp: denies worsening  SOB/Cough  GI: Denies N/V/Abd pain  : Denies dysuria    VITALS/PHYSICAL EXAM  --------------------------------------------------------------------------------  T(C): 36.7 (11-14-22 @ 20:16), Max: 36.9 (11-14-22 @ 04:30)  HR: 81 (11-14-22 @ 20:16) (70 - 82)  BP: 138/63 (11-14-22 @ 20:16) (110/59 - 138/63)  RR: 18 (11-14-22 @ 20:16) (18 - 18)  SpO2: 90% (11-14-22 @ 20:16) (90% - 95%)  Wt(kg): --        11-13-22 @ 07:01  -  11-14-22 @ 07:00  --------------------------------------------------------  IN: 300 mL / OUT: 3450 mL / NET: -3150 mL    11-14-22 @ 07:01  -  11-14-22 @ 21:03  --------------------------------------------------------  IN: 1110 mL / OUT: 700 mL / NET: 410 mL      Physical Exam:  	  Gen: Non toxic comfortable appearing   	+ JVD   	Pulm: decrease bs  no rales or ronchi or wheezing  	CV: RRR, S1S2; no rub  	Abd: +BS, soft, nontender/nondistended  	UE: Warm, no cyanosis  no clubbing,  no edema; no asterixis  	LE: Warm, no cyanosis  no clubbing, no   edema  	Neuro: alert and oriented. speech coherent         LABS/STUDIES  --------------------------------------------------------------------------------    139  |  91  |  64  ----------------------------<  179      [11-14-22 @ 17:45]  4.1   |  32  |  1.75        Ca     10.2     [11-14-22 @ 17:45]      Mg     2.0     [11-14-22 @ 06:13]    TPro  8.4  /  Alb  4.3  /  TBili  1.0  /  DBili  x   /  AST  26  /  ALT  54  /  AlkPhos  79  [11-14-22 @ 06:13]        Uric acid 14.3      [11-14-22 @ 06:13]    Creatinine Trend:  SCr 1.75 [11-14 @ 17:45]  SCr 1.72 [11-14 @ 06:13]  SCr 1.56 [11-13 @ 17:55]  SCr 1.53 [11-13 @ 05:58]  SCr 1.40 [11-12 @ 18:40]                  TSH 2.86      [11-10-22 @ 06:58]  Lipid: chol 167, , HDL 52, LDL --      [11-10-22 @ 06:55]        
CARDIOLOGY FOLLOW UP - Dr. Carrera  DATE OF SERVICE: 11/10/22    CC no cp or sob         REVIEW OF SYSTEMS:  CONSTITUTIONAL: No fever, weight loss, or fatigue  RESPIRATORY: No cough, wheezing, chills or hemoptysis; No Shortness of Breath  CARDIOVASCULAR: No chest pain, palpitations, passing out, dizziness, or leg swelling  GASTROINTESTINAL: No abdominal or epigastric pain. No nausea, vomiting, or hematemesis; No diarrhea or constipation. No melena or hematochezia.  VASCULAR: No edema     PHYSICAL EXAM:  T(C): 36.5 (11-10-22 @ 04:22), Max: 36.7 (11-09-22 @ 20:00)  HR: 69 (11-10-22 @ 10:10) (68 - 76)  BP: 101/55 (11-10-22 @ 04:22) (101/55 - 102/53)  RR: 18 (11-10-22 @ 04:22) (18 - 18)  SpO2: 91% (11-10-22 @ 10:10) (91% - 98%)  Wt(kg): --  I&O's Summary      Appearance: Normal	  Cardiovascular: Normal S1 S2,Rr + murmur   Respiratory: Lungs clear to auscultation	  Gastrointestinal:  Soft, Non-tender, + BS	  Extremities: Normal range of motion, No clubbing, cyanosis or edema      Home Medications:  finasteride 5 mg oral tablet: 1 tab(s) orally once a day (10 Nov 2022 10:16)  metoprolol succinate 100 mg oral tablet, extended release: 1 tab(s) orally once a day (10 Nov 2022 10:16)  tamsulosin 0.4 mg oral capsule: 1 cap(s) orally 2 times a day (10 Nov 2022 10:16)  Vitamin C 1000 mg oral tablet: 1 tab(s) orally once a day (10 Nov 2022 10:16)  Vitamin D3 1000 intl units oral tablet: 1 tab(s) orally once a day (10 Nov 2022 10:16)      MEDICATIONS  (STANDING):  aspirin enteric coated 81 milliGRAM(s) Oral daily  atorvastatin 20 milliGRAM(s) Oral at bedtime  chlorhexidine 2% Cloths 1 Application(s) Topical <User Schedule>  finasteride 5 milliGRAM(s) Oral daily  furosemide   Injectable 40 milliGRAM(s) IV Push daily  metoprolol succinate  milliGRAM(s) Oral daily  tamsulosin 0.4 milliGRAM(s) Oral at bedtime      TELEMETRY: nsr 	    ECG:  	  RADIOLOGY:   DIAGNOSTIC TESTING:  [ ] Echocardiogram:  [ ]  Catheterization:  [ ] Stress Test:    < from: Nuclear Stress Test-Pharmacologic (Nuclear Stress Test-Pharmacologic .) (11.09.22 @ 17:11) >  NUCLEAR FINDINGS:  Review of raw data shows: Significant motion artfact.  The left ventricle was enlarged.  There are medium-sized, moderate defects in the basal to  mid inferolateral wall that are partially reversible  suggestive of infarct with moderate shilo-infarct ischemia.  There are medium-sized, mild to moderate defects in the  distal anterior, distal anteroseptal, and apical walls  that are mostly fixed suggestive of infarct with minimal  shilo-infarct ischemia.  There are medium-sized, moderate to severe defects in the  basal inferior and basal septal walls that are fixed  suggestive of infarct.  ------------------------------------------------------------------------  GATED ANALYSIS:  Post-stress gated wall motion analysis was performed (LVEF  = 59 %;LVEDV = 225 ml.) revealing hypokinesis of the basal  inferior and basal septal walls and reduced systolic  thickening of the basal to mid inferolateral, distal  anterior, distal anteroseptal, and apical walls.  There is  paradoxical septal motion consistent with a post-operative  state and/or paced rhythm.  The right ventricle  qualitatively appears enlarged; correlation with  echocardiography is recommended.  ------------------------------------------------------------------------  IMPRESSIONS:Abnormal Study  * Symptom: Shortness of breath.  * HR Response: Appropriate; BP Response: Appropriate.  * Heart Rhythm: Paced Rhythm - 66 BPM.  * ECG Abnormalities: ECG changes could not be interpreted  due to paced rhythm.  * Review of raw data shows: Significant motion artfact.  * The left ventricle was enlarged.  * There are medium-sized, moderate defects in the basal to  mid inferolateral wall that are partially reversible  suggestive of infarct with moderate shilo-infarct ischemia.  * There are medium-sized, mild to moderate defects in the  distal anterior, distal anteroseptal, and apicalwalls  that are mostly fixed suggestive of infarct with minimal  shilo-infarct ischemia.  * There are medium-sized, moderate to severe defects in  the basal inferior and basal septal walls that are fixed  suggestive of infarct.  * Post-stress gated wall motion analysis was performed  (LVEF = 59 %;LVEDV = 225 ml.) revealing hypokinesis of the  basal inferior and basal septal walls and reduced systolic  thickening of the basal to mid inferolateral, distal  anterior, distal anteroseptal, and apical walls.  There is  paradoxical septal motion consistent with a post-operative  state and/or paced rhythm.  The right ventricle  qualitatively appears enlarged; correlation with  echocardiography is recommended.  ------------------------------------------------------------------------  Confirmed on  11/9/2022 - 17:43:23 by Connor Howell M.D.  ------------------------------------------------------------------------    < end of copied text >    OTHER: 	    LABS:	 	    Creatine Kinase, Serum: 92 U/L [30 - 200] (11-08 @ 11:02)  CKMB Units: 3.0 ng/mL [0.0 - 6.7] (11-08 @ 11:02)  Troponin T, High Sensitivity Result: 23 ng/L [0 - 51] (11-08 @ 11:02)  Troponin T, High Sensitivity Result: 25 ng/L [0 - 51] (11-08 @ 05:51)                          13.4   10.84 )-----------( 153      ( 09 Nov 2022 06:55 )             40.1     11-09    141  |  105  |  26<H>  ----------------------------<  93  4.3   |  25  |  0.92    Ca    9.2      09 Nov 2022 06:55    TPro  7.0  /  Alb  3.8  /  TBili  1.0  /  DBili  x   /  AST  58<H>  /  ALT  126<H>  /  AlkPhos  84  11-09            
CARDIOLOGY FOLLOW UP - Dr. Carrera  DATE OF SERVICE: 11/14/22     CC- orthopnea improving   no cp     REVIEW OF SYSTEMS:  CONSTITUTIONAL: No fever, weight loss, or fatigue  RESPIRATORY: No cough, wheezing, chills or hemoptysis; No Shortness of Breath  CARDIOVASCULAR: No chest pain, palpitations, passing out, dizziness, or leg swelling  GASTROINTESTINAL: No abdominal or epigastric pain. No nausea, vomiting, or hematemesis; No diarrhea or constipation. No melena or hematochezia.  VASCULAR: No edema     PHYSICAL EXAM:  T(C): 36.6 (11-14-22 @ 11:39), Max: 36.9 (11-14-22 @ 04:30)  HR: 78 (11-14-22 @ 11:39) (72 - 82)  BP: 110/59 (11-14-22 @ 11:39) (107/52 - 119/67)  RR: 18 (11-14-22 @ 11:39) (18 - 18)  SpO2: 93% (11-14-22 @ 11:39) (93% - 94%)  Wt(kg): --  I&O's Summary    13 Nov 2022 07:01  -  14 Nov 2022 07:00  --------------------------------------------------------  IN: 300 mL / OUT: 3450 mL / NET: -3150 mL    14 Nov 2022 07:01  -  14 Nov 2022 12:12  --------------------------------------------------------  IN: 360 mL / OUT: 600 mL / NET: -240 mL        Appearance: Normal	  Cardiovascular: Normal S1 S2,RRR, No JVD, No murmurs  Respiratory:  diminsihed 	  Gastrointestinal:  Soft, Non-tender, + BS	  Extremities: Normal range of motion, No clubbing, cyanosis or edema  right groin thrill     Home Medications:  finasteride 5 mg oral tablet: 1 tab(s) orally once a day (10 Nov 2022 10:16)  metoprolol succinate 100 mg oral tablet, extended release: 1 tab(s) orally once a day (10 Nov 2022 10:16)  tamsulosin 0.4 mg oral capsule: 1 cap(s) orally 2 times a day (10 Nov 2022 10:16)  Vitamin C 1000 mg oral tablet: 1 tab(s) orally once a day (10 Nov 2022 10:16)  Vitamin D3 1000 intl units oral tablet: 1 tab(s) orally once a day (10 Nov 2022 10:16)      MEDICATIONS  (STANDING):  apixaban 5 milliGRAM(s) Oral every 12 hours  aspirin enteric coated 81 milliGRAM(s) Oral daily  atorvastatin 20 milliGRAM(s) Oral at bedtime  buMETAnide Infusion 0.5 mG/Hr (2.5 mL/Hr) IV Continuous <Continuous>  chlorhexidine 2% Cloths 1 Application(s) Topical <User Schedule>  finasteride 5 milliGRAM(s) Oral daily  metoprolol succinate ER 50 milliGRAM(s) Oral daily  tamsulosin 0.4 milliGRAM(s) Oral at bedtime      TELEMETRY:nsr  	    ECG:  	  RADIOLOGY:   DIAGNOSTIC TESTING:  [ ] Echocardiogram:  [ ]  Catheterization:  [ ] Stress Test:    OTHER: 	    LABS:	 	    Creatine Kinase, Serum: 83 U/L [30 - 200] (11-11 @ 07:59)  CKMB Units: 2.0 ng/mL [0.0 - 6.7] (11-11 @ 07:59)  Troponin T, High Sensitivity Result: 60 ng/L [0 - 51] (11-11 @ 07:59)  Creatine Kinase, Serum: 92 U/L [30 - 200] (11-08 @ 11:02)  CKMB Units: 3.0 ng/mL [0.0 - 6.7] (11-08 @ 11:02)  Troponin T, High Sensitivity Result: 23 ng/L [0 - 51] (11-08 @ 11:02)  Troponin T, High Sensitivity Result: 25 ng/L [0 - 51] (11-08 @ 05:51)      11-14    142  |  94<L>  |  57<H>  ----------------------------<  136<H>  3.4<L>   |  33<H>  |  1.72<H>    Ca    10.2      14 Nov 2022 06:13  Mg     2.0     11-14    TPro  8.4<H>  /  Alb  4.3  /  TBili  1.0  /  DBili  x   /  AST  26  /  ALT  54<H>  /  AlkPhos  79  11-14            
CARDIOLOGY FOLLOW UP - Dr. Carrera  DATE OF SERVICE: 11/17/22     CC ashlyn cancelled by department given pt + for rhinovirus   no cp or sob  hypoxic when sleeping and not using cpap      REVIEW OF SYSTEMS:  CONSTITUTIONAL: No fever, weight loss, or fatigue  RESPIRATORY: No cough, wheezing, chills or hemoptysis; No Shortness of Breath  CARDIOVASCULAR: No chest pain, palpitations, passing out, dizziness, or leg swelling  GASTROINTESTINAL: No abdominal or epigastric pain. No nausea, vomiting, or hematemesis; No diarrhea or constipation. No melena or hematochezia.      PHYSICAL EXAM:  T(C): 36.4 (11-17-22 @ 11:54), Max: 37.1 (11-16-22 @ 20:20)  HR: 72 (11-17-22 @ 11:54) (70 - 78)  BP: 120/64 (11-17-22 @ 11:54) (103/56 - 124/68)  RR: 18 (11-17-22 @ 11:54) (18 - 20)  SpO2: 91% (11-17-22 @ 11:54) (91% - 95%)  Wt(kg): --  I&O's Summary    16 Nov 2022 07:01  -  17 Nov 2022 07:00  --------------------------------------------------------  IN: 480 mL / OUT: 1550 mL / NET: -1070 mL        Appearance: Normal	  Cardiovascular: Normal S1 S2,RRR,  Respiratory: diminished   Gastrointestinal:  Soft, Non-tender, + BS	  Extremities: Normal range of motion, No clubbing, cyanosis or edema      Home Medications:  finasteride 5 mg oral tablet: 1 tab(s) orally once a day (10 Nov 2022 10:16)  metoprolol succinate 100 mg oral tablet, extended release: 1 tab(s) orally once a day (10 Nov 2022 10:16)  tamsulosin 0.4 mg oral capsule: 1 cap(s) orally 2 times a day (10 Nov 2022 10:16)  Vitamin C 1000 mg oral tablet: 1 tab(s) orally once a day (10 Nov 2022 10:16)  Vitamin D3 1000 intl units oral tablet: 1 tab(s) orally once a day (10 Nov 2022 10:16)      MEDICATIONS  (STANDING):  allopurinol 200 milliGRAM(s) Oral daily  apixaban 5 milliGRAM(s) Oral every 12 hours  aspirin enteric coated 81 milliGRAM(s) Oral daily  atorvastatin 20 milliGRAM(s) Oral at bedtime  buMETAnide 1 milliGRAM(s) Oral daily  chlorhexidine 2% Cloths 1 Application(s) Topical <User Schedule>  finasteride 5 milliGRAM(s) Oral daily  metoprolol succinate ER 50 milliGRAM(s) Oral daily  tamsulosin 0.4 milliGRAM(s) Oral at bedtime      TELEMETRY: 	nsr    ECG:  	  RADIOLOGY:   DIAGNOSTIC TESTING:  [ ] Echocardiogram:  [ ]  Catheterization:  [ ] Stress Test:    OTHER: 	    LABS:	 	    Creatine Kinase, Serum: 83 U/L [30 - 200] (11-11 @ 07:59)  CKMB Units: 2.0 ng/mL [0.0 - 6.7] (11-11 @ 07:59)  Troponin T, High Sensitivity Result: 60 ng/L [0 - 51] (11-11 @ 07:59)                          13.6   8.62  )-----------( 199      ( 16 Nov 2022 06:35 )             40.9     11-17    141  |  101  |  55<H>  ----------------------------<  108<H>  3.9   |  29  |  1.28    Ca    9.2      17 Nov 2022 06:04  Phos  3.6     11-16  Mg     2.5     11-17              
CARDIOLOGY FOLLOW UP NOTE - DR. RIBEIRO    Patient Name: UMAIR LUGO  Date of Service: 22 @ 10:12    Patient seen and examined  still with intermittent dyspnea  on supp o2     Subjective:    cv: denies chest pain, palpitations, dizziness  pulmonary: denies cough  GI: denies abdominal pain, nausea, vomiting  vascular/legs: no edema   skin: no rash  ROS: otherwise negative   overnight events:      PHYSICAL EXAM:  T(C): 36.6 (22 @ 05:11), Max: 36.8 (22 @ 10:46)  HR: 75 (22 @ 05:11) (69 - 77)  BP: 100/65 (22 @ 05:11) (94/55 - 109/65)  RR: 18 (22 @ 05:11) (18 - 18)  SpO2: 95% (22 @ 05:11) (95% - 95%)  Wt(kg): --  I&O's Summary    2022 07:  -  2022 07:00  --------------------------------------------------------  IN: 1420 mL / OUT: 1900 mL / NET: -480 mL    2022 07:01  -  2022 10:12  --------------------------------------------------------  IN: 0 mL / OUT: 600 mL / NET: -600 mL      Daily     Daily Weight in k.6 (2022 08:12)    Appearance: Normal	  Cardiovascular: Normal S1 S2,RRR, No JVD, No murmurs  Respiratory: Lungs clear to auscultation	  Gastrointestinal:  Soft, Non-tender, + BS	  Extremities: Normal range of motion, No clubbing, cyanosis or edema  right groin + palp mass, thrill      Home Medications:  finasteride 5 mg oral tablet: 1 tab(s) orally once a day (10 Nov 2022 10:16)  metoprolol succinate 100 mg oral tablet, extended release: 1 tab(s) orally once a day (10 Nov 2022 10:16)  tamsulosin 0.4 mg oral capsule: 1 cap(s) orally 2 times a day (10 Nov 2022 10:16)  Vitamin C 1000 mg oral tablet: 1 tab(s) orally once a day (10 Nov 2022 10:16)  Vitamin D3 1000 intl units oral tablet: 1 tab(s) orally once a day (10 Nov 2022 10:16)      MEDICATIONS  (STANDING):  aspirin enteric coated 81 milliGRAM(s) Oral daily  atorvastatin 20 milliGRAM(s) Oral at bedtime  chlorhexidine 2% Cloths 1 Application(s) Topical <User Schedule>  finasteride 5 milliGRAM(s) Oral daily  furosemide   Injectable 40 milliGRAM(s) IV Push two times a day  metolazone 2.5 milliGRAM(s) Oral once  metoprolol succinate ER 50 milliGRAM(s) Oral daily  tamsulosin 0.4 milliGRAM(s) Oral at bedtime      TELEMETRY: 	    ECG:  	  RADIOLOGY:   DIAGNOSTIC TESTING:  [ ] Echocardiogram:  [ ] Catheterization:  [ ] Stress Test:    OTHER: 	    LABS:	 	    CARDIAC MARKERS:        Troponin T, High Sensitivity Result: 60 ng/L ( @ 07:59)  Troponin T, High Sensitivity Result: 23 ng/L ( @ 11:02)  Troponin T, High Sensitivity Result: 25 ng/L ( @ 05:51)                                13.8   9.98  )-----------( 200      ( 2022 07:59 )             41.4         142  |  102  |  38<H>  ----------------------------<  165<H>  3.9   |  26  |  1.21    Ca    9.3      2022 07:59  Phos  4.0       Mg     2.0           proBNP:   PTT - ( 2022 02:48 )  PTT:114.7 sec  Lipid Profile:   HgA1c:     Creatinine, Serum: 1.21 mg/dL (22 @ 07:59)            
CARDIOLOGY FOLLOW UP NOTE - DR. RIBEIRO    Patient Name: UMAIR LUGO  Date of Service: 22 @ 12:16    Patient seen and examined  feels better  still with orthopnea  hypoxic on RA    Subjective:    cv: denies chest pain, +dyspnea, palpitations, dizziness  pulmonary: denies cough  GI: denies abdominal pain, nausea, vomiting  vascular/legs: no edema   skin: no rash  ROS: otherwise negative   overnight events:      PHYSICAL EXAM:  T(C): 36.6 (22 @ 11:31), Max: 36.8 (22 @ 05:01)  HR: 83 (22 @ 11:54) (61 - 90)  BP: 131/62 (22 @ 11:31) (94/54 - 131/62)  RR: 18 (22 @ 11:31) (18 - 18)  SpO2: 92% (22 @ 11:54) (91% - 98%)  Wt(kg): --  I&O's Summary    2022 07:01  -  2022 07:00  --------------------------------------------------------  IN: 195 mL / OUT: 3925 mL / NET: -3730 mL      Daily     Daily Weight in k.3 (2022 08:34)    Appearance: Normal	  Cardiovascular: Normal S1 S2,RRR, No JVD, No murmurs  Respiratory: Lungs clear to auscultation	  Gastrointestinal:  Soft, Non-tender, + BS	  Extremities: Normal range of motion, No clubbing, cyanosis or edema      Home Medications:  finasteride 5 mg oral tablet: 1 tab(s) orally once a day (10 Nov 2022 10:16)  metoprolol succinate 100 mg oral tablet, extended release: 1 tab(s) orally once a day (10 Nov 2022 10:16)  tamsulosin 0.4 mg oral capsule: 1 cap(s) orally 2 times a day (10 Nov 2022 10:16)  Vitamin C 1000 mg oral tablet: 1 tab(s) orally once a day (10 Nov 2022 10:16)  Vitamin D3 1000 intl units oral tablet: 1 tab(s) orally once a day (10 Nov 2022 10:16)      MEDICATIONS  (STANDING):  apixaban 5 milliGRAM(s) Oral every 12 hours  aspirin enteric coated 81 milliGRAM(s) Oral daily  atorvastatin 20 milliGRAM(s) Oral at bedtime  buMETAnide Infusion 0.5 mG/Hr (2.5 mL/Hr) IV Continuous <Continuous>  chlorhexidine 2% Cloths 1 Application(s) Topical <User Schedule>  finasteride 5 milliGRAM(s) Oral daily  metoprolol succinate ER 50 milliGRAM(s) Oral daily  potassium chloride    Tablet ER 20 milliEquivalent(s) Oral every 2 hours  tamsulosin 0.4 milliGRAM(s) Oral at bedtime      TELEMETRY: 	    ECG:  	  RADIOLOGY:   DIAGNOSTIC TESTING:  [ ] Echocardiogram:  [ ] Catheterization:  [ ] Stress Test:    OTHER: 	    LABS:	 	    CARDIAC MARKERS:        Troponin T, High Sensitivity Result: 60 ng/L ( @ 07:59)                141  |  97  |  42<H>  ----------------------------<  119<H>  3.4<L>   |  29  |  1.53<H>    Ca    9.4      2022 05:58  Mg     2.0         TPro  7.4  /  Alb  3.8  /  TBili  1.1  /  DBili  x   /  AST  16  /  ALT  51<H>  /  AlkPhos  71      proBNP:   PTT - ( 2022 10:45 )  PTT:96.2 sec  Lipid Profile:   HgA1c:     Creatinine, Serum: 1.53 mg/dL (22 @ 05:58)  Creatinine, Serum: 1.40 mg/dL (22 @ 18:40)  Creatinine, Serum: 1.03 mg/dL (22 @ 10:45)  Creatinine, Serum: 1.21 mg/dL (22 @ 07:59)            
Date of Service: 11-17-22 @ 11:23    Patient is a 67y old  Male who presents with a chief complaint of hypoxia (16 Nov 2022 14:30)      Any change in ROS:  doing ok: no sob: no cough:       MEDICATIONS  (STANDING):  allopurinol 200 milliGRAM(s) Oral daily  apixaban 5 milliGRAM(s) Oral every 12 hours  aspirin enteric coated 81 milliGRAM(s) Oral daily  atorvastatin 20 milliGRAM(s) Oral at bedtime  buMETAnide 1 milliGRAM(s) Oral daily  chlorhexidine 2% Cloths 1 Application(s) Topical <User Schedule>  finasteride 5 milliGRAM(s) Oral daily  metoprolol succinate ER 50 milliGRAM(s) Oral daily  tamsulosin 0.4 milliGRAM(s) Oral at bedtime    MEDICATIONS  (PRN):    Vital Signs Last 24 Hrs  T(C): 36.8 (17 Nov 2022 04:09), Max: 37.1 (16 Nov 2022 20:20)  T(F): 98.3 (17 Nov 2022 04:09), Max: 98.8 (16 Nov 2022 20:20)  HR: 73 (17 Nov 2022 07:32) (70 - 78)  BP: 124/68 (17 Nov 2022 04:09) (103/56 - 124/68)  BP(mean): --  RR: 18 (17 Nov 2022 04:09) (18 - 20)  SpO2: 93% (17 Nov 2022 07:32) (92% - 95%)    Parameters below as of 17 Nov 2022 07:32  Patient On (Oxygen Delivery Method): room air        I&O's Summary    16 Nov 2022 07:01  -  17 Nov 2022 07:00  --------------------------------------------------------  IN: 480 mL / OUT: 1550 mL / NET: -1070 mL          Physical Exam:   GENERAL: NAD, well-groomed, well-developed  HEENT: BRIA/   Atraumatic, Normocephalic  ENMT: No tonsillar erythema, exudates, or enlargement; Moist mucous membranes, Good dentition, No lesions  NECK: Supple, No JVD, Normal thyroid  CHEST/LUNG: Clear to auscultaion  CVS: Regular rate and rhythm; No murmurs, rubs, or gallops  GI: : Soft, Nontender, Nondistended; Bowel sounds present  NERVOUS SYSTEM:  Alert & Oriented X3  EXTREMITIES:  2+ Peripheral Pulses, No clubbing, cyanosis, or edema  LYMPH: No lymphadenopathy noted  SKIN: No rashes or lesions  ENDOCRINOLOGY: No Thyromegaly  PSYCH: Appropriate    Labs:  ABG - ( 16 Nov 2022 06:56 )  pH, Arterial: 7.49  pH, Blood: x     /  pCO2: 47    /  pO2: 65    / HCO3: 36    / Base Excess: 10.9  /  SaO2: 92.8            rad< from: [facility] Duplex Lower Ext Vein Scan, Bilat (11.17.22 @ 09:29) >    COMPARISON: 11/11/2022    TECHNIQUE: Duplex sonography of the BILATERAL LOWER extremity veins with   color and spectral Doppler, with and without compression.    FINDINGS:    RIGHT:  Normal compressibility of the RIGHT common femoral, femoral and popliteal   veins.  Doppler examination shows normal spontaneous and phasic flow.  No RIGHT calf vein thrombosis is detected.    LEFT:  Normal compressibility of the LEFT common femoral, femoral and popliteal   veins.  Doppler examination shows normal spontaneous and phasic flow.  No LEFT calf vein thrombosis is detected.    IMPRESSION:  No evidence of deep venous thrombosis in either lower extremity.          --- End of Report ---    < end of copied text >  < from: Xray Chest 1 View- PORTABLE-Urgent (Xray Chest 1 View- PORTABLE-Urgent .) (11.16.22 @ 07:38) >  TECHNIQUE: Single frontal, portable view of the chest was obtained.    COMPARISON: Chest x-ray 11/12/2022.    FINDINGS:  LINES/TUBES/SUPPORT DEVICES: Left chest wall pacemaker.    LUNGS: Mild bibasilar atelectasis.    PLEURA: No pleural effusion or pneumothorax.    HEART AND MEDIASTINUM: Heart size cannot be accurately assessed.   Sternotomy wires.    BONES: No acute bony abnormality.    IMPRESSION:  Mild bibasilar atelectasis.    --- End of Report ---          RYDER MUÑOZ MD; Resident Radiologist  This document has been electronically signed.  GENARO HALL MD; Attending Radiologist    < end of copied text >  < from: [facility] Duplex Lower Ext Vein Scan, Bilat (11.17.22 @ 09:29) >  Doppler examination shows normal spontaneous and phasic flow.  No RIGHT calf vein thrombosis is detected.    LEFT:  Normal compressibility of the LEFT common femoral, femoral and popliteal   veins.  Doppler examination shows normal spontaneous and phasic flow.  No LEFT calf vein thrombosis is detected.    IMPRESSION:  No evidence of deep venous thrombosis in either lower extremity.          --- End of Report ---      < end of copied text >  < from: CT Angio Chest PE Protocol w/ IV Cont (11.08.22 @ 09:01) >    UPPER ABDOMEN: Cholelithiasis.    BONES/SOFT TISSUES: Sternotomy.      IMPRESSION:    No pulmonary embolism through the segmental branches. Several   subsegmental branches cannot be accurately evaluated.    Mild interstitial edema.    --- End of Report ---            SHERWIN NIEVES M.D., ATTENDING RADIOGIST  This document has been electronically signed. Nov 8 2022  9:22AM    < end of copied text >                              13.6   8.62  )-----------( 199      ( 16 Nov 2022 06:35 )             40.9                         15.4   9.76  )-----------( 226      ( 15 Nov 2022 07:31 )             45.9     11-17    141  |  101  |  55<H>  ----------------------------<  108<H>  3.9   |  29  |  1.28  11-16    142  |  100  |  62<H>  ----------------------------<  117<H>  3.6   |  31  |  1.44<H>  11-15    141  |  93<L>  |  66<H>  ----------------------------<  131<H>  3.4<L>   |  31  |  1.84<H>  11-14    139  |  91<L>  |  64<H>  ----------------------------<  179<H>  4.1   |  32<H>  |  1.75<H>  11-14    142  |  94<L>  |  57<H>  ----------------------------<  136<H>  3.4<L>   |  33<H>  |  1.72<H>  11-13    139  |  94<L>  |  49<H>  ----------------------------<  120<H>  3.6   |  32<H>  |  1.56<H>    Ca    9.2      17 Nov 2022 06:04  Ca    9.3      16 Nov 2022 06:35  Phos  3.6     11-16  Mg     2.5     11-17  Mg     2.4     11-16    TPro  8.4<H>  /  Alb  4.3  /  TBili  1.0  /  DBili  x   /  AST  26  /  ALT  54<H>  /  AlkPhos  79  11-14    CAPILLARY BLOOD GLUCOSE        rad< from: [facility] Duplex Lower Ext Vein Scan, Bilat (11.17.22 @ 09:29) >  TECHNIQUE: Duplex sonography of the BILATERAL LOWER extremity veins with   color and spectral Doppler, with and without compression.    FINDINGS:    RIGHT:  Normal compressibility of the RIGHT common femoral, femoral and popliteal   veins.  Doppler examination shows normal spontaneous and phasic flow.  No RIGHT calf vein thrombosis is detected.    LEFT:  Normal compressibility of the LEFT common femoral, femoral and popliteal   veins.  Doppler examination shows normal spontaneous and phasic flow.  No LEFT calf vein thrombosis is detected.    IMPRESSION:  No evidence of deep venous thrombosis in either lower extremity.          --- End of Report ---            ROGER EAST MD;Attending Radiologist  This document has been electronically signed. Nov 17 2022  9:39AM    < end of copied text >  < from: Xray Chest 1 View- PORTABLE-Urgent (Xray Chest 1 View- PORTABLE-Urgent .) (11.16.22 @ 07:38) >  FINDINGS:  LINES/TUBES/SUPPORT DEVICES: Left chest wall pacemaker.    LUNGS: Mild bibasilar atelectasis.    PLEURA: No pleural effusion or pneumothorax.    HEART AND MEDIASTINUM: Heart size cannot be accurately assessed.   Sternotomy wires.    BONES: No acute bony abnormality.    IMPRESSION:  Mild bibasilar atelectasis.    --- End of Report ---          RYDER MUÑOZ MD; Resident Radiologist  This document has been electronically signed.  GENARO HALL MD; Attending Radiologist  This document has been electronically signed. Nov 16 2022  9:48AM    < end of copied text >  < from: TTE with Doppler (w/Cont) (11.09.22 @ 07:25) >  consistent with moderate pulmonary hypertension.  ------------------------------------------------------------------------  Conclusions:  1. Bioprosthetic aortic valve. Peak transaortic valve  gradient equals 25 mm Hg, mean transaortic valve gradient  equals 15 mm Hg, which is probably normal in the presence  of a bioprosthetic aortic valve.  2. Normal left ventricular internal dimensions and wall  thicknesses.  3. Normal left ventricular systolic function.  4. Right ventricular enlargement with normal right  ventricular systolic function.  5. Normal tricuspid valve. Mild tricuspid regurgitation.  6. Estimated pulmonary artery systolic pressure equals 57  mm Hg, assuming right atrial pressure equals 8 mm Hg,  consistent with moderate pulmonary pressures.  *** No previous Echo exam.  ------------------------------------------------------------------------  Confirmed on  11/9/2022 - 09:56:24 by Bernardo Brown M.D.    < end of copied text >                RECENT CULTURES:        RESPIRATORY CULTURES:          Studies  Chest X-RAY  CT SCAN Chest   Venous Dopplers: LE:   CT Abdomen  Others              
Date of service: 22 @ 15:26      Patient is a 67y old  Male who presents with a chief complaint of                                                              INTERVAL HPI/OVERNIGHT EVENTS:    REVIEW OF SYSTEMS:     CONSTITUTIONAL: No weakness, fevers or chills  EYES/ENT: No visual changes , no ear ache   NECK: No pain or stiffness  RESPIRATORY: No cough, wheezing,  No shortness of breath  CARDIOVASCULAR: No chest pain or palpitations  GASTROINTESTINAL: No abdominal pain  . No nausea, vomiting, or hematemesis; No diarrhea or constipation. No melena or hematochezia.  GENITOURINARY: No dysuria, frequency or hematuria  NEUROLOGICAL: No numbness or weakness  SKIN: No itching, burning, rashes, or lesions                                                                                                                                                                                                                                                                                 Medications:  MEDICATIONS  (STANDING):  aspirin enteric coated 81 milliGRAM(s) Oral daily  atorvastatin 20 milliGRAM(s) Oral at bedtime  chlorhexidine 2% Cloths 1 Application(s) Topical <User Schedule>  finasteride 5 milliGRAM(s) Oral daily  furosemide   Injectable 40 milliGRAM(s) IV Push two times a day  heparin  Infusion.  Unit(s)/Hr (18 mL/Hr) IV Continuous <Continuous>  tamsulosin 0.4 milliGRAM(s) Oral at bedtime    MEDICATIONS  (PRN):  heparin   Injectable 8500 Unit(s) IV Push every 6 hours PRN For aPTT less than 40  heparin   Injectable 4000 Unit(s) IV Push every 6 hours PRN For aPTT between 40 - 57       Allergies    No Known Allergies    Intolerances      Vital Signs Last 24 Hrs  T(C): 36.8 (2022 10:46), Max: 37.2 (10 Nov 2022 18:30)  T(F): 98.2 (2022 10:46), Max: 99 (10 Nov 2022 18:30)  HR: 74 (2022 10:46) (65 - 87)  BP: 94/55 (2022 10:46) (91/45 - 123/67)  BP(mean): --  RR: 18 (2022 10:46) (16 - 20)  SpO2: 95% (2022 10:46) (92% - 97%)    Parameters below as of 2022 10:46  Patient On (Oxygen Delivery Method): nasal cannula  O2 Flow (L/min): 4    CAPILLARY BLOOD GLUCOSE          11-10 @ 07:01  -   @ 07:00  --------------------------------------------------------  IN: 0 mL / OUT: 1050 mL / NET: -1050 mL      Physical Exam:    Daily     Daily Weight in k.8 (2022 07:36)  General:  Well appearing, NAD, not cachetic  HEENT:  Nonicteric, PERRLA  CV:  RRR, S1S2   Lungs:  CTA B/L, no wheezes, rales, rhonchi  Abdomen:  Soft, non-tender, no distended, positive BS  Extremities:  2+ pulses, no c/c, no edema  Skin:  Warm and dry, no rashes  :  No crystal  Neuro:  AAOx3, non-focal, grossly intact                                                                                                                                                                                                                                                                                                LABS:                               13.8   9.98  )-----------( 200      ( 2022 07:59 )             41.4                          142  |  102  |  38<H>  ----------------------------<  165<H>  3.9   |  26  |  1.21    Ca    9.3      2022 07:59  Phos  4.0       Mg     2.0                              RADIOLOGY & ADDITIONAL TESTS         I personally reviewed: [  ]EKG   [  ]CXR    [  ] CT      A/P:         Discussed with :     Aarti consultants' Notes   Time spent :  
Date of service: 22 @ 23:55      Patient is a 67y old  Male who presents with a chief complaint of                                                              INTERVAL HPI/OVERNIGHT EVENTS:    REVIEW OF SYSTEMS:     CONSTITUTIONAL: No weakness, fevers or chills  EYES/ENT: No visual changes , no ear ache   NECK: No pain or stiffness  RESPIRATORY: No cough, wheezing,  No shortness of breath  CARDIOVASCULAR: No chest pain or palpitations  GASTROINTESTINAL: No abdominal pain  . No nausea, vomiting, or hematemesis; No diarrhea or constipation. No melena or hematochezia.  GENITOURINARY: No dysuria, frequency or hematuria  NEUROLOGICAL: No numbness or weakness  SKIN: No itching, burning, rashes, or lesions                                                                                                                                                                                                                                                                                 Medications:  MEDICATIONS  (STANDING):  apixaban 5 milliGRAM(s) Oral every 12 hours  aspirin enteric coated 81 milliGRAM(s) Oral daily  atorvastatin 20 milliGRAM(s) Oral at bedtime  buMETAnide Infusion 0.5 mG/Hr (2.5 mL/Hr) IV Continuous <Continuous>  chlorhexidine 2% Cloths 1 Application(s) Topical <User Schedule>  finasteride 5 milliGRAM(s) Oral daily  metoprolol succinate ER 50 milliGRAM(s) Oral daily  tamsulosin 0.4 milliGRAM(s) Oral at bedtime    MEDICATIONS  (PRN):       Allergies    No Known Allergies    Intolerances      Vital Signs Last 24 Hrs  T(C): 36.6 (2022 21:02), Max: 36.7 (2022 12:04)  T(F): 97.8 (:02), Max: 98.1 (2022 12:04)  HR: 71 (2022 22:55) (68 - 76)  BP: 107/61 (2022 21:02) (100/65 - 130/67)  BP(mean): --  RR: 18 (2022 21:02) (18 - 18)  SpO2: 96% (2022 22:55) (94% - 98%)    Parameters below as of 2022 21:02  Patient On (Oxygen Delivery Method): nasal cannula  O2 Flow (L/min): 2    CAPILLARY BLOOD GLUCOSE           @ 07:01  -   @ 07:00  --------------------------------------------------------  IN: 1420 mL / OUT: 1900 mL / NET: -480 mL     @ 07:01   @ 23:55  --------------------------------------------------------  IN: 85 mL / OUT: 3025 mL / NET: -2940 mL      Physical Exam:    Daily     Daily Weight in k.6 (2022 08:12)  General:  Well appearing, NAD, not cachetic  HEENT:  Nonicteric, PERRLA  CV:  RRR, S1S2   Lungs:  CTA B/L, no wheezes, rales, rhonchi  Abdomen:  Soft, non-tender, no distended, positive BS  Extremities:  2+ pulses, no c/c, no edema  Skin:  Warm and dry, no rashes  :  No crystal  Neuro:  AAOx3, non-focal, grossly intact                                                                                                                                                                                                                                                                                                LABS:                               13.8   9.98  )-----------( 200      ( 2022 07:59 )             41.4                          141  |  101  |  38<H>  ----------------------------<  127<H>  3.9   |  28  |  1.40<H>    Ca    9.2      2022 18:40  Phos  4.0       Mg     2.0                              RADIOLOGY & ADDITIONAL TESTS         I personally reviewed: [  ]EKG   [  ]CXR    [  ] CT      A/P:         Discussed with :     Aarti consultants' Notes   Time spent :  
[facility]   [phone]  RENAL FOLLOW UP NOTE  --------------------------------------------------------------------------------  Chief Complaint:    24 hour events/subjective:    seen earlier   states had episode of sob earlier   denies any sob now when seen   not on O2    PAST HISTORY  --------------------------------------------------------------------------------  No significant changes to PMH, PSH, FHx, SHx, unless otherwise noted    ALLERGIES & MEDICATIONS  --------------------------------------------------------------------------------  Allergies    No Known Allergies    Intolerances      Standing Inpatient Medications  allopurinol 200 milliGRAM(s) Oral daily  apixaban 5 milliGRAM(s) Oral every 12 hours  aspirin enteric coated 81 milliGRAM(s) Oral daily  atorvastatin 20 milliGRAM(s) Oral at bedtime  buMETAnide 1 milliGRAM(s) Oral daily  chlorhexidine 2% Cloths 1 Application(s) Topical <User Schedule>  finasteride 5 milliGRAM(s) Oral daily  metoprolol succinate ER 50 milliGRAM(s) Oral daily  tamsulosin 0.4 milliGRAM(s) Oral at bedtime    PRN Inpatient Medications      REVIEW OF SYSTEMS  --------------------------------------------------------------------------------    Gen: denies  fevers/chills,  CVS: denies chest pain/palpitations  Resp: denies further SOB/Cough  GI: Denies N/V/Abd pain  : Denies dysuria/oliguria/hematuria      VITALS/PHYSICAL EXAM  --------------------------------------------------------------------------------  T(C): 37.1 (11-16-22 @ 20:20), Max: 37.1 (11-16-22 @ 20:20)  HR: 70 (11-16-22 @ 20:20) (60 - 78)  BP: 103/56 (11-16-22 @ 20:20) (96/56 - 118/68)  RR: 20 (11-16-22 @ 20:20) (16 - 20)  SpO2: 94% (11-16-22 @ 20:20) (91% - 95%)  Wt(kg): --        11-15-22 @ 07:01  -  11-16-22 @ 07:00  --------------------------------------------------------  IN: 720 mL / OUT: 1750 mL / NET: -1030 mL    11-16-22 @ 07:01  -  11-16-22 @ 20:51  --------------------------------------------------------  IN: 480 mL / OUT: 600 mL / NET: -120 mL      Physical Exam:  	              Gen: Non toxic comfortable appearing   	Pulm: decrease bs  no rales or ronchi or wheezing  	CV: No JVD. RRR, S1S2; no rub  	Abd: +BS, soft, nontender/nondistended  	UE: Warm, no cyanosis  no clubbing,  no edema  	LE: Warm, no cyanosis  no clubbing, no   edema  	Neuro: alert and oriented. speech coherent       LABS/STUDIES  --------------------------------------------------------------------------------              13.6   8.62  >-----------<  199      [11-16-22 @ 06:35]              40.9     142  |  100  |  62  ----------------------------<  117      [11-16-22 @ 06:35]  3.6   |  31  |  1.44        Ca     9.3     [11-16-22 @ 06:35]      Mg     2.4     [11-16-22 @ 06:35]      Phos  3.6     [11-16-22 @ 06:35]            Creatinine Trend:  SCr 1.44 [11-16 @ 06:35]  SCr 1.84 [11-15 @ 07:29]  SCr 1.75 [11-14 @ 17:45]  SCr 1.72 [11-14 @ 06:13]  SCr 1.56 [11-13 @ 17:55]                  TSH 2.86      [11-10-22 @ 06:58]  Lipid: chol 167, , HDL 52, LDL --      [11-10-22 @ 06:55]        
pt seen and examined   chart reivwed   case d/w cardio and pul   pt was to have JULIANO : on hold for RVP postivie   overall improved   MARIO improved   still unclear why hypoxic at times : no obvious pfo or shunt on bubble study..  will need CT angio to eval for fistula   discussed with Dr. Rodney  d/c  >35 min   see dc summary     
Date of service: 11-09-22 @ 23:17      Patient is a 67y old  Male who presents with a chief complaint of                                                              INTERVAL HPI/OVERNIGHT EVENTS:    REVIEW OF SYSTEMS:     CONSTITUTIONAL: No weakness, fevers or chills  EYES/ENT: No visual changes , no ear ache   NECK: No pain or stiffness  RESPIRATORY: No cough, wheezing,  No shortness of breath  CARDIOVASCULAR: No chest pain or palpitations  GASTROINTESTINAL: No abdominal pain  . No nausea, vomiting, or hematemesis; No diarrhea or constipation. No melena or hematochezia.  GENITOURINARY: No dysuria, frequency or hematuria  NEUROLOGICAL: No numbness or weakness  SKIN: No itching, burning, rashes, or lesions                                                                                                                                                                                                                                                                                 Medications:  MEDICATIONS  (STANDING):  aspirin enteric coated 81 milliGRAM(s) Oral daily  atorvastatin 20 milliGRAM(s) Oral at bedtime  finasteride 5 milliGRAM(s) Oral daily  furosemide   Injectable 40 milliGRAM(s) IV Push daily  metoprolol succinate  milliGRAM(s) Oral daily  tamsulosin 0.4 milliGRAM(s) Oral at bedtime    MEDICATIONS  (PRN):       Allergies    No Known Allergies    Intolerances      Vital Signs Last 24 Hrs  T(C): 36.6 (09 Nov 2022 22:00), Max: 37 (09 Nov 2022 11:51)  T(F): 97.9 (09 Nov 2022 22:00), Max: 98.6 (09 Nov 2022 11:51)  HR: 68 (09 Nov 2022 22:00) (66 - 81)  BP: 101/64 (09 Nov 2022 22:00) (94/56 - 122/78)  BP(mean): 85 (09 Nov 2022 05:44) (85 - 85)  RR: 18 (09 Nov 2022 22:00) (16 - 18)  SpO2: 93% (09 Nov 2022 22:00) (88% - 96%)    Parameters below as of 09 Nov 2022 22:00  Patient On (Oxygen Delivery Method): room air      CAPILLARY BLOOD GLUCOSE          Physical Exam:    Daily     Daily   General:  Well appearing, NAD, not cachetic  HEENT:  Nonicteric, PERRLA  CV:  RRR, S1S2   Lungs:  CTA B/L, no wheezes, rales, rhonchi  Abdomen:  Soft, non-tender, no distended, positive BS  Extremities:  2+ pulses, no c/c, no edema  Skin:  Warm and dry, no rashes  :  No crystal  Neuro:  AAOx3, non-focal, grossly intact                                                                                                                                                                                                                                                                                                LABS:                               13.4   10.84 )-----------( 153      ( 09 Nov 2022 06:55 )             40.1                      11-09    141  |  105  |  26<H>  ----------------------------<  93  4.3   |  25  |  0.92    Ca    9.2      09 Nov 2022 06:55  Mg     2.2     11-08    TPro  7.0  /  Alb  3.8  /  TBili  1.0  /  DBili  x   /  AST  58<H>  /  ALT  126<H>  /  AlkPhos  84  11-09                       RADIOLOGY & ADDITIONAL TESTS         I personally reviewed: [  ]EKG   [  ]CXR    [  ] CT      A/P:         Discussed with :     Aarti consultants' Notes   Time spent :

## 2022-11-17 NOTE — PROGRESS NOTE ADULT - TIME BILLING
pt, np , pul and cardio np
pt, np ,, cardio
pt, np ,, cardio
Agree with above NP note.  cv stable  volume status improved  patient w persistent hypoxia  etiology unclear  recommend JULIANO to r/o any shunting which may be contributing  cont oral bumex as above  cont a/c
Agree with above NP note.  cv stable and clinically improving with agg diuresis   start oral bumex as above  cont a/c  DCP
Patient seen and examined.  Agree with above NP note.  cv stable and clinically improving with agg diuresis   orthopnea resolved  iverson improved  3 l neg output past 2 days   curt noted with effective diuresis   stop bumex drip at 8 pm tonight  expect inc in bun/creat sharron  will restart oral bumex sharron in am pending bmp  cont a/c  plan for d/c hopefully sharron\o2 sat improved
as above
as above
Patient seen and examined.  Agree with above NP note.  cv stable  events noted  remains hypoxic req supp o2 sec to pulmonary edema/HF  sbp low normal   bb decreased  continue iv lasix bid  will inc as needed or start cont drip if outpt not sufficient   vasc eval noted  ***RIGHT FEMORAL ARTERY PSA/AVF IS CHRONIC**** NOT RELATED TO CATH AND RIGHT FEMORAL VEIN ACCESS 11/10  RIGHT FEMORAL ARTERY NOT ACCESSED 11/10, PULSATILE MASS RIGHT GROIN NOTED ON PALPATION BEFORE RIGHT FEMORAL VEIN ACCESS ATTEMPT  patient recalls thrill on self palpation over the past 4-5 years  PSA/AVF secondary to femoral artery access in 2013 for avr/poss cath     continue iv diuretics x 24-48 more hours

## 2022-11-23 ENCOUNTER — APPOINTMENT (OUTPATIENT)
Dept: VASCULAR SURGERY | Facility: CLINIC | Age: 67
End: 2022-11-23

## 2022-11-23 PROCEDURE — 99442: CPT | Mod: PD,95

## 2022-11-23 RX ORDER — BUMETANIDE 1 MG/1
1 TABLET ORAL
Qty: 30 | Refills: 0 | Status: ACTIVE | COMMUNITY
Start: 2022-11-17

## 2022-11-23 RX ORDER — ATORVASTATIN CALCIUM 20 MG/1
20 TABLET, FILM COATED ORAL
Qty: 30 | Refills: 0 | Status: ACTIVE | COMMUNITY
Start: 2022-11-17

## 2022-11-23 RX ORDER — RIVAROXABAN 20 MG/1
20 TABLET, FILM COATED ORAL
Refills: 0 | Status: ACTIVE | COMMUNITY

## 2022-11-23 RX ORDER — FINASTERIDE 5 MG/1
5 TABLET, FILM COATED ORAL
Qty: 90 | Refills: 0 | Status: ACTIVE | COMMUNITY
Start: 2022-08-09

## 2022-11-23 RX ORDER — ALLOPURINOL 100 MG/1
100 TABLET ORAL
Qty: 60 | Refills: 0 | Status: ACTIVE | COMMUNITY
Start: 2022-11-17

## 2022-11-23 NOTE — HISTORY OF PRESENT ILLNESS
[FreeTextEntry1] : R femoral psa w associated avf\par \par afib on eliquis\par avrx2\par ascending aneurysm repair\par ppm\par htn\par hx dvt\par  [de-identified] : 11/23/22: known to me from recent hospitalization.  has chronic R femoral psa, w avf.  He was admitted for SOB, which is improved.  Now he has followed up w his outpt cardiologist, who rec repair of avf as this may be contributing to high output heart failure.\par \par had inpt cath, with mild luminal disease\par stress w preserved ef

## 2022-11-23 NOTE — ASSESSMENT
[FreeTextEntry1] : extensive discussion w pt, as well as outpt cardiologist Jed Somekh\par AVF could be contributing to high output heart failure\par \par in that instance, surgical repair is recommended, plan CT angio for preoperative planning, tentative OR early dec\par \par will need to hold eliquis 48 hours prior to procedure

## 2022-11-24 ENCOUNTER — INPATIENT (INPATIENT)
Facility: HOSPITAL | Age: 67
LOS: 12 days | Discharge: HOME HEALTH | DRG: 266 | End: 2022-12-07
Attending: THORACIC SURGERY (CARDIOTHORACIC VASCULAR SURGERY) | Admitting: GENERAL ACUTE CARE HOSPITAL
Payer: MEDICARE

## 2022-11-24 VITALS
HEIGHT: 72 IN | OXYGEN SATURATION: 97 % | HEART RATE: 69 BPM | TEMPERATURE: 97 F | SYSTOLIC BLOOD PRESSURE: 106 MMHG | RESPIRATION RATE: 18 BRPM | DIASTOLIC BLOOD PRESSURE: 67 MMHG | WEIGHT: 220.02 LBS

## 2022-11-24 DIAGNOSIS — I48.91 UNSPECIFIED ATRIAL FIBRILLATION: ICD-10-CM

## 2022-11-24 DIAGNOSIS — Z29.9 ENCOUNTER FOR PROPHYLACTIC MEASURES, UNSPECIFIED: ICD-10-CM

## 2022-11-24 DIAGNOSIS — Z95.2 PRESENCE OF PROSTHETIC HEART VALVE: Chronic | ICD-10-CM

## 2022-11-24 DIAGNOSIS — Z95.0 PRESENCE OF CARDIAC PACEMAKER: Chronic | ICD-10-CM

## 2022-11-24 DIAGNOSIS — Z87.438 PERSONAL HISTORY OF OTHER DISEASES OF MALE GENITAL ORGANS: ICD-10-CM

## 2022-11-24 DIAGNOSIS — D72.829 ELEVATED WHITE BLOOD CELL COUNT, UNSPECIFIED: ICD-10-CM

## 2022-11-24 DIAGNOSIS — I10 ESSENTIAL (PRIMARY) HYPERTENSION: ICD-10-CM

## 2022-11-24 DIAGNOSIS — I50.33 ACUTE ON CHRONIC DIASTOLIC (CONGESTIVE) HEART FAILURE: ICD-10-CM

## 2022-11-24 DIAGNOSIS — Z98.890 OTHER SPECIFIED POSTPROCEDURAL STATES: Chronic | ICD-10-CM

## 2022-11-24 DIAGNOSIS — R74.01 ELEVATION OF LEVELS OF LIVER TRANSAMINASE LEVELS: ICD-10-CM

## 2022-11-24 DIAGNOSIS — I77.0 ARTERIOVENOUS FISTULA, ACQUIRED: ICD-10-CM

## 2022-11-24 DIAGNOSIS — N17.9 ACUTE KIDNEY FAILURE, UNSPECIFIED: ICD-10-CM

## 2022-11-24 DIAGNOSIS — R94.31 ABNORMAL ELECTROCARDIOGRAM [ECG] [EKG]: ICD-10-CM

## 2022-11-24 DIAGNOSIS — I50.23 ACUTE ON CHRONIC SYSTOLIC (CONGESTIVE) HEART FAILURE: ICD-10-CM

## 2022-11-24 LAB
ALBUMIN SERPL ELPH-MCNC: 3.9 G/DL — SIGNIFICANT CHANGE UP (ref 3.3–5)
ALP SERPL-CCNC: 118 U/L — SIGNIFICANT CHANGE UP (ref 40–120)
ALT FLD-CCNC: 132 U/L — HIGH (ref 10–45)
ANION GAP SERPL CALC-SCNC: 12 MMOL/L — SIGNIFICANT CHANGE UP (ref 5–17)
APPEARANCE UR: CLEAR — SIGNIFICANT CHANGE UP
APTT BLD: 34.2 SEC — SIGNIFICANT CHANGE UP (ref 27.5–35.5)
AST SERPL-CCNC: 146 U/L — HIGH (ref 10–40)
BASOPHILS # BLD AUTO: 0.05 K/UL — SIGNIFICANT CHANGE UP (ref 0–0.2)
BASOPHILS NFR BLD AUTO: 0.4 % — SIGNIFICANT CHANGE UP (ref 0–2)
BILIRUB SERPL-MCNC: 1.1 MG/DL — SIGNIFICANT CHANGE UP (ref 0.2–1.2)
BILIRUB UR-MCNC: NEGATIVE — SIGNIFICANT CHANGE UP
BUN SERPL-MCNC: 39 MG/DL — HIGH (ref 7–23)
CALCIUM SERPL-MCNC: 9.9 MG/DL — SIGNIFICANT CHANGE UP (ref 8.4–10.5)
CHLORIDE SERPL-SCNC: 103 MMOL/L — SIGNIFICANT CHANGE UP (ref 96–108)
CO2 SERPL-SCNC: 26 MMOL/L — SIGNIFICANT CHANGE UP (ref 22–31)
COLOR SPEC: SIGNIFICANT CHANGE UP
CREAT SERPL-MCNC: 1.87 MG/DL — HIGH (ref 0.5–1.3)
DIFF PNL FLD: NEGATIVE — SIGNIFICANT CHANGE UP
EGFR: 38 ML/MIN/1.73M2 — LOW
EOSINOPHIL # BLD AUTO: 0.03 K/UL — SIGNIFICANT CHANGE UP (ref 0–0.5)
EOSINOPHIL NFR BLD AUTO: 0.2 % — SIGNIFICANT CHANGE UP (ref 0–6)
GLUCOSE SERPL-MCNC: 173 MG/DL — HIGH (ref 70–99)
GLUCOSE UR QL: NEGATIVE — SIGNIFICANT CHANGE UP
HCT VFR BLD CALC: 38.5 % — LOW (ref 39–50)
HGB BLD-MCNC: 12.7 G/DL — LOW (ref 13–17)
IMM GRANULOCYTES NFR BLD AUTO: 0.7 % — SIGNIFICANT CHANGE UP (ref 0–0.9)
INR BLD: 1.88 RATIO — HIGH (ref 0.88–1.16)
KETONES UR-MCNC: NEGATIVE — SIGNIFICANT CHANGE UP
LEUKOCYTE ESTERASE UR-ACNC: NEGATIVE — SIGNIFICANT CHANGE UP
LYMPHOCYTES # BLD AUTO: 1.46 K/UL — SIGNIFICANT CHANGE UP (ref 1–3.3)
LYMPHOCYTES # BLD AUTO: 12 % — LOW (ref 13–44)
MAGNESIUM SERPL-MCNC: 2.2 MG/DL — SIGNIFICANT CHANGE UP (ref 1.6–2.6)
MCHC RBC-ENTMCNC: 31.9 PG — SIGNIFICANT CHANGE UP (ref 27–34)
MCHC RBC-ENTMCNC: 33 GM/DL — SIGNIFICANT CHANGE UP (ref 32–36)
MCV RBC AUTO: 96.7 FL — SIGNIFICANT CHANGE UP (ref 80–100)
MONOCYTES # BLD AUTO: 0.64 K/UL — SIGNIFICANT CHANGE UP (ref 0–0.9)
MONOCYTES NFR BLD AUTO: 5.3 % — SIGNIFICANT CHANGE UP (ref 2–14)
NEUTROPHILS # BLD AUTO: 9.89 K/UL — HIGH (ref 1.8–7.4)
NEUTROPHILS NFR BLD AUTO: 81.4 % — HIGH (ref 43–77)
NITRITE UR-MCNC: NEGATIVE — SIGNIFICANT CHANGE UP
NRBC # BLD: 0 /100 WBCS — SIGNIFICANT CHANGE UP (ref 0–0)
NT-PROBNP SERPL-SCNC: 7668 PG/ML — HIGH (ref 0–300)
PH UR: 5.5 — SIGNIFICANT CHANGE UP (ref 5–8)
PLATELET # BLD AUTO: 237 K/UL — SIGNIFICANT CHANGE UP (ref 150–400)
POTASSIUM SERPL-MCNC: 4.8 MMOL/L — SIGNIFICANT CHANGE UP (ref 3.5–5.3)
POTASSIUM SERPL-SCNC: 4.8 MMOL/L — SIGNIFICANT CHANGE UP (ref 3.5–5.3)
PROCALCITONIN SERPL-MCNC: 0.1 NG/ML — SIGNIFICANT CHANGE UP (ref 0.02–0.1)
PROT SERPL-MCNC: 7.4 G/DL — SIGNIFICANT CHANGE UP (ref 6–8.3)
PROT UR-MCNC: ABNORMAL
PROTHROM AB SERPL-ACNC: 22 SEC — HIGH (ref 10.5–13.4)
RAPID RVP RESULT: SIGNIFICANT CHANGE UP
RBC # BLD: 3.98 M/UL — LOW (ref 4.2–5.8)
RBC # FLD: 13.8 % — SIGNIFICANT CHANGE UP (ref 10.3–14.5)
SARS-COV-2 RNA SPEC QL NAA+PROBE: SIGNIFICANT CHANGE UP
SODIUM SERPL-SCNC: 141 MMOL/L — SIGNIFICANT CHANGE UP (ref 135–145)
SP GR SPEC: 1.01 — SIGNIFICANT CHANGE UP (ref 1.01–1.02)
TROPONIN T, HIGH SENSITIVITY RESULT: 32 NG/L — SIGNIFICANT CHANGE UP (ref 0–51)
TROPONIN T, HIGH SENSITIVITY RESULT: 32 NG/L — SIGNIFICANT CHANGE UP (ref 0–51)
UROBILINOGEN FLD QL: NEGATIVE — SIGNIFICANT CHANGE UP
WBC # BLD: 12.15 K/UL — HIGH (ref 3.8–10.5)
WBC # FLD AUTO: 12.15 K/UL — HIGH (ref 3.8–10.5)

## 2022-11-24 PROCEDURE — 99223 1ST HOSP IP/OBS HIGH 75: CPT

## 2022-11-24 PROCEDURE — 71046 X-RAY EXAM CHEST 2 VIEWS: CPT | Mod: 26

## 2022-11-24 PROCEDURE — 99285 EMERGENCY DEPT VISIT HI MDM: CPT | Mod: GC

## 2022-11-24 RX ORDER — BUMETANIDE 0.25 MG/ML
2 INJECTION INTRAMUSCULAR; INTRAVENOUS ONCE
Refills: 0 | Status: COMPLETED | OUTPATIENT
Start: 2022-11-24 | End: 2022-11-24

## 2022-11-24 RX ORDER — CHOLECALCIFEROL (VITAMIN D3) 125 MCG
1000 CAPSULE ORAL DAILY
Refills: 0 | Status: DISCONTINUED | OUTPATIENT
Start: 2022-11-24 | End: 2022-12-05

## 2022-11-24 RX ORDER — METOPROLOL TARTRATE 50 MG
50 TABLET ORAL DAILY
Refills: 0 | Status: DISCONTINUED | OUTPATIENT
Start: 2022-11-24 | End: 2022-12-04

## 2022-11-24 RX ORDER — BUMETANIDE 0.25 MG/ML
1 INJECTION INTRAMUSCULAR; INTRAVENOUS
Refills: 0 | Status: DISCONTINUED | OUTPATIENT
Start: 2022-11-24 | End: 2022-12-05

## 2022-11-24 RX ORDER — FINASTERIDE 5 MG/1
5 TABLET, FILM COATED ORAL DAILY
Refills: 0 | Status: DISCONTINUED | OUTPATIENT
Start: 2022-11-24 | End: 2022-12-05

## 2022-11-24 RX ORDER — TAMSULOSIN HYDROCHLORIDE 0.4 MG/1
0.4 CAPSULE ORAL
Refills: 0 | Status: DISCONTINUED | OUTPATIENT
Start: 2022-11-24 | End: 2022-12-05

## 2022-11-24 RX ORDER — BUMETANIDE 0.25 MG/ML
1 INJECTION INTRAMUSCULAR; INTRAVENOUS DAILY
Refills: 0 | Status: DISCONTINUED | OUTPATIENT
Start: 2022-11-24 | End: 2022-11-24

## 2022-11-24 RX ORDER — ALLOPURINOL 300 MG
200 TABLET ORAL DAILY
Refills: 0 | Status: DISCONTINUED | OUTPATIENT
Start: 2022-11-24 | End: 2022-12-05

## 2022-11-24 RX ORDER — FUROSEMIDE 40 MG
40 TABLET ORAL ONCE
Refills: 0 | Status: COMPLETED | OUTPATIENT
Start: 2022-11-24 | End: 2022-11-24

## 2022-11-24 RX ORDER — ASCORBIC ACID 60 MG
500 TABLET,CHEWABLE ORAL DAILY
Refills: 0 | Status: DISCONTINUED | OUTPATIENT
Start: 2022-11-24 | End: 2022-12-05

## 2022-11-24 RX ORDER — IPRATROPIUM/ALBUTEROL SULFATE 18-103MCG
3 AEROSOL WITH ADAPTER (GRAM) INHALATION ONCE
Refills: 0 | Status: COMPLETED | OUTPATIENT
Start: 2022-11-24 | End: 2022-12-03

## 2022-11-24 RX ORDER — RIVAROXABAN 15 MG-20MG
15 KIT ORAL
Refills: 0 | Status: DISCONTINUED | OUTPATIENT
Start: 2022-11-24 | End: 2022-11-29

## 2022-11-24 RX ADMIN — Medication 1000 UNIT(S): at 12:26

## 2022-11-24 RX ADMIN — TAMSULOSIN HYDROCHLORIDE 0.4 MILLIGRAM(S): 0.4 CAPSULE ORAL at 16:59

## 2022-11-24 RX ADMIN — FINASTERIDE 5 MILLIGRAM(S): 5 TABLET, FILM COATED ORAL at 12:11

## 2022-11-24 RX ADMIN — Medication 50 MILLIGRAM(S): at 12:11

## 2022-11-24 RX ADMIN — Medication 500 MILLIGRAM(S): at 12:10

## 2022-11-24 RX ADMIN — Medication 200 MILLIGRAM(S): at 12:10

## 2022-11-24 RX ADMIN — Medication 40 MILLIGRAM(S): at 04:02

## 2022-11-24 RX ADMIN — RIVAROXABAN 15 MILLIGRAM(S): KIT at 16:59

## 2022-11-24 RX ADMIN — BUMETANIDE 1 MILLIGRAM(S): 0.25 INJECTION INTRAMUSCULAR; INTRAVENOUS at 18:24

## 2022-11-24 RX ADMIN — BUMETANIDE 2 MILLIGRAM(S): 0.25 INJECTION INTRAMUSCULAR; INTRAVENOUS at 12:10

## 2022-11-24 NOTE — H&P ADULT - PROBLEM SELECTOR PROBLEM 3
Lab Results   Component Value Date    A1C 12.0 05/17/2017    A1C 9.2 11/05/2014    A1C 10.9 06/25/2014    A1C 9.3 04/07/2014    A1C 11.1 01/21/2014     Medication is being filled for 1 time refill only due to:  Patient needs to be seen because due for diabetes follow up, is scheduled.     Flagged for TC to let patient know Rx sent (see patient request for call).    Leticia Kohli RN  [facility]         History of BPH

## 2022-11-24 NOTE — ED PROVIDER NOTE - CLINICAL SUMMARY MEDICAL DECISION MAKING FREE TEXT BOX
66 y/o Male w/ PMHx of HTN, diastolic CHF, AAA s/p repair, DVT x 2 separate episodes (s/p Eliquis, last used 6 months ago), s/p AVR (Bovine), s/p PPM, BPH presented to the hospital c/p SOB/BRAND x2 days. , previously admitted on 11/8/22 for acute CHF presenting with 2hrs of SOB. No F/C/N/CP. Concern for acute CHF vs. metabolic vs. infectious pathology. Will re-evaluate.

## 2022-11-24 NOTE — H&P ADULT - PROBLEM SELECTOR PLAN 5
- Possible from hepatic congestion from heart failure. Hepatitis C negative nov 9 2022. No hx of ETOH use.   - Repeat ordered.

## 2022-11-24 NOTE — H&P ADULT - PROBLEM SELECTOR PLAN 6
As per chart review.   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

## 2022-11-24 NOTE — ED CLERICAL - NS ED CLERK NOTE PRE-ARRIVAL INFORMATION; ADDITIONAL PRE-ARRIVAL INFORMATION
This patient is enrolled in the Heart Failure STARS readmission reduction initiative and has active care navigation. This patient can be followed up by the care navigation team within 24 hours.  To arrange close follow-up or to obtain additional clinical information, please call the contact number above.   For patients followed by the NS cardiology heart failure team, please call the on call cardiomyopathy attending ([phone]) for ALL PATIENTS PRIOR to decision for admission or observation.   Consider CDU for management of CHF exacerbations per guidelines.

## 2022-11-24 NOTE — ED PROVIDER NOTE - NS ED ROS FT
ROS:  -Constitutional: Denies fever  -Head: Denies headache  -Eyes: Denies blurry vision  -Cardiovascular: Denies chest pain  -Pulmonary: + shortness of breath  -Gastrointestinal: Denies nausea or diarrhea  -Genitourinary: Denies dysuria  -Skin: Denies new rashes  -Neuro: Denies numbness or tingling

## 2022-11-24 NOTE — ED PROVIDER NOTE - OBJECTIVE STATEMENT
66 y/o Male w/ PMHx of HTN, diastolic CHF, AAA s/p repair, DVT x 2 separate episodes (s/p Eliquis, last used 6 months ago), s/p AVR (Bovine), s/p PPM, BPH presented to the hospital c/p SOB/BRAND x2 days. , previously admitted on 11/8/22 for acute CHF presenting with 2hrs of SOB. No F/C/N/CP.    Stress test was abnormal w/ evidence of infarct and shilo-infarct ischemia, LVEF 59%, revealing hypokinesis of basal inferior septal walls and reduced systolic thickening of the basal to mid inferolateral, distal anterior, distal anteroseptal, and apical walls. s/p cardiac cath reveal luminal LCx/Ramus disease and mild pLAD disease. PASP severely elevated i/s/o markedly elevated filling pressures/wedge pressure.

## 2022-11-24 NOTE — ED ADULT NURSE REASSESSMENT NOTE - NS ED NURSE REASSESS COMMENT FT1
Received pt in assigned area a&xo3 resting in stretcher, states he has been feeling sob, no resp distress is noted, on 2l via NC, o2 sat 99%, resps even and non labored, denies any cp, dizziness or palpitations, skin intact, IVL intact, on cardiac monitor, family at bedside, awaiting dispo

## 2022-11-24 NOTE — H&P ADULT - NSHPREVIEWOFSYSTEMS_GEN_ALL_CORE
Constitutional: No Fever, Fatigue,   Eyes: No recent vision problems or eye pain.  ENT: No congestion, ear pain, or sore throat.  Endocrine: No excess sweating, temperature intolerance  Cardiovascular: Shortness of breath and mild edema. No chest pain, palpitations,  Respiratory: cough. No congestion, or wheezing.  Gastrointestinal: No abdominal pain, nausea, vomiting, or diarrhea.  Genitourinary: No dysuria, hematuria  Musculoskeletal: No joint swelling, joint pain  Neurologic: No headache, dizziness, focal weakness  Skin: No rashes, hematoma, prupura

## 2022-11-24 NOTE — H&P ADULT - NSICDXFAMILYHX_GEN_ALL_CORE_FT
FAMILY HISTORY:  No pertinent family history in first degree relatives     FAMILY HISTORY:  Father  Still living? Unknown  FH: colon cancer, Age at diagnosis: Age Unknown    Mother  Still living? Unknown  FH: lung cancer, Age at diagnosis: Age Unknown

## 2022-11-24 NOTE — ED ADULT NURSE NOTE - OBJECTIVE STATEMENT
Pt c/o SOB and dyspnea at rest that started 2 hours prior to arrival. Pt recent admit for CHF exacerbation, went home on thursday and now having same symptoms. Pt AAO, VSS, resp even and unlabored, Spo2 98% on RA. Pt placed on 2L of oxygen per request for comfort. NAD noted at this time.

## 2022-11-24 NOTE — H&P ADULT - HISTORY OF PRESENT ILLNESS
Patient is a 68 yo Man with pmhx of HTN, diastolic CHF, AAA s/p repair, DVT x 2 separate episodes (s/p Eliquis, last used 6 months ago), s/p AVR (Bovine), s/p medtronic PPM,  BPH, hx of paroxysmal atrial fibrillation,  who presents for shortness of breath and dyspnea on exertion for the past two days.     He was previously admitted on 11/8/22 for acute CHF Patient is a 68 yo Man with pmhx of HTN, diastolic CHF, AAA s/p repair, DVT x 2 separate episodes (s/p Eliquis, last used 6 months ago), s/p AVR (Bovine), s/p medtronic PPM,  BPH, hx of paroxysmal atrial fibrillation, possible AV fistula in his Right groin  who presents for shortness of breath  He was watching tv and he started feeling short of breath. This prompted him and his son to bring him to the ER. He states he has been taking medications as prescribed. He did not miss a dose. He had stopped aspirin. He endorses occasional cough, and mild leg swelling. He was previously admitted on 11/8/22 for acute CHF. He went to work on Friday after discharge. And the following Monday went to see his PCP.      Patient is a 68 yo Man with pmhx of HTN, diastolic CHF, AAA s/p repair, DVT x 2 separate episodes (s/p Eliquis, last used 6 months ago), s/p AVR (Bovine), s/p medtronic PPM,  BPH, hx of paroxysmal atrial fibrillation, possible AV fistula in his Right groin  who presents for shortness of breath  He was watching tv and he started feeling short of breath. This prompted him and his son to bring him to the ER. He states he has been taking medications as prescribed. He did not miss a dose. He had stopped aspirin. He endorses occasional cough, and mild leg swelling. He was previously admitted on 11/8/22 for acute CHF c/b entero/rhino virus infection. He went to work on Friday after discharge. And the following Monday went to see his PCP.      Patient is a 66 yo Man with pmhx of HTN,Gout,HLD, Mild CAD, diastolic CHF, AAA s/p repair, DVT x 2 separate episodes (s/p Eliquis), s/p AVR (Bovine), s/p medtronic PPM,  BPH, hx of paroxysmal atrial fibrillation on xarelto, possible AV fistula in his Right groin who presents for shortness of breath  He was watching tv and he started feeling short of breath. This prompted him and his son to bring him to the ER. He states he has been taking medications as prescribed. He did not miss a dose. He had stopped aspirin, presumably due to mild CAD. He endorses occasional cough, and mild leg swelling. He was previously admitted on 11/8/22 for acute CHF c/b entero/rhino virus infection. He went to work on Friday after discharge. And the following Monday went to see his PCP.

## 2022-11-24 NOTE — H&P ADULT - ASSESSMENT
Patient is a 68 yo Man with pmhx of HTN,Gout,HLD, Mild CAD, diastolic CHF, AAA s/p repair, DVT x 2 separate episodes (s/p Eliquis), s/p AVR (Bovine), s/p medtronic PPM,  BPH, hx of paroxysmal atrial fibrillation on xarelto, possible AV fistula in his Right groin who presents for shortness of breath. Found with acute decompensated diastolic heart failure.

## 2022-11-24 NOTE — H&P ADULT - NSHPLABSRESULTS_GEN_ALL_CORE
12.7   12.15 )-----------( 237      ( 24 Nov 2022 04:05 )             38.5     Hgb Trend: 12.7<--  11-24    141  |  103  |  39<H>  ----------------------------<  173<H>  4.8   |  26  |  1.87<H>    Ca    9.9      24 Nov 2022 04:05    TPro  7.4  /  Alb  3.9  /  TBili  1.1  /  DBili  x   /  AST  146<H>  /  ALT  132<H>  /  AlkPhos  118  11-24    Creatinine Trend: 1.87<--, 1.28<--, 1.44<--, 1.84<--, 1.75<--, 1.72<--  PT/INR - ( 24 Nov 2022 04:05 )   PT: 22.0 sec;   INR: 1.88 ratio         PTT - ( 24 Nov 2022 04:05 )  PTT:34.2 sec ECG personally reviewed. V paced HR 61, . Reviewed telemetry around 10:30am, V paced, QTC around 490.   Chest xray personally reviewed. Mild vascular congestion commonly seen in acute heart failure.                  12.7   12.15 )-----------( 237      ( 24 Nov 2022 04:05 )             38.5     Hgb Trend: 12.7<--  11-24    141  |  103  |  39<H>  ----------------------------<  173<H>  4.8   |  26  |  1.87<H>    Ca    9.9      24 Nov 2022 04:05    TPro  7.4  /  Alb  3.9  /  TBili  1.1  /  DBili  x   /  AST  146<H>  /  ALT  132<H>  /  AlkPhos  118  11-24    Creatinine Trend: 1.87<--, 1.28<--, 1.44<--, 1.84<--, 1.75<--, 1.72<--  PT/INR - ( 24 Nov 2022 04:05 )   PT: 22.0 sec;   INR: 1.88 ratio         PTT - ( 24 Nov 2022 04:05 )  PTT:34.2 sec    < from: Xray Chest 2 Views PA/Lat (11.24.22 @ 05:26) >    FINDINGS:  Indistinct pulmonary vascularity. Early congestive changes No pleural   effusion or pneumothorax.  Heart is enlarged. Left chest wall AICD in place.  No acute osseous findings.    IMPRESSION:  Mild congestive heart failure. Continued follow-up recommended

## 2022-11-24 NOTE — H&P ADULT - PROBLEM SELECTOR PLAN 10
- DVT prophylaxis - on xarelto   - Diet - DASH/TLC  - Goals of care- He states his emergency contact is brother in law josué. He filled out a form in the past. He would want resuscitation if his heart stopped. , but does not want to be left connected to a machine.

## 2022-11-24 NOTE — ED PROVIDER NOTE - PHYSICAL EXAMINATION
PHYSICAL EXAM:  CONSTITUTIONAL: Well appearing, awake, alert, oriented to person, place, time/situation and in no apparent distress.  HEAD: Atraumatic  EYES: Clear bilaterally, pupils equal, round and reactive to light.  ENMT: Airway patent, Nasal mucosa clear. Mouth with normal mucosa. Uvula is midline.   CARDIAC: Normal rate, regular rhythm. +S1/S2. No murmurs, rubs or gallops.  RESPIRATORY: Breathing unlabored. Breath sounds clear and equal bilaterally. +1 pitting edema.   ABDOMEN:  Soft, nontender, nondistended. No rebound tenderness or guarding.  NEUROLOGICAL: Alert and oriented, no focal deficits, no motor or sensory deficits. CN2-12 intact. Sensation intact x4 extremities.  SKIN: Skin warm and dry. No evidence of rashes or lesions.

## 2022-11-24 NOTE — ED PROVIDER NOTE - ATTENDING CONTRIBUTION TO CARE
I have personally seen and examined this patient.  I have fully participated in the care of this patient. I performed a substantive portion of the visit including all aspects of the medical decision making. I have reviewed all pertinent clinical information, including history, physical exam, plan and the Resident’s note and agree except as noted. - MD Iman.    66 yo M, with h/o chf, recentadmission, for sob, stopped lasix since discharge, + bilateral pitting edema, not hypoxic but + iverson, symptomatic chf, requires admission, ekg without ischemic patter, labs, bnp, trop, and admission to dr. louise.

## 2022-11-24 NOTE — ED PROVIDER NOTE - PROGRESS NOTE DETAILS
Radha Beltran MD (PGY-1 EM): called [facility] for admission of patient, no answer Radha Beltran MD (PGY-1 EM): called [facility] for admission of patient, left voicemail no answer Navi PGY2   Patient signed out to me pending admission.   Discussed with Dr. Polanco over the phone and will admit under his service for CHF exacerbation.

## 2022-11-24 NOTE — H&P ADULT - PROBLEM SELECTOR PLAN 7
- Appreciated on admission ecg. Improved on telemetry.   - Restarting metoprolol. Electrolyes within normal limits.   - Cardiology consult   - Avoid QT prolonging medications.

## 2022-11-24 NOTE — H&P ADULT - PROBLEM SELECTOR PLAN 1
- Shortness of breath, mild leg edema, and elevated pro-bnp.  -s/p furosemide in the ED.   - Wean off oxygen.   -Restarting bumex home dose. Monitoring fluid status closely.   - Recent echo.   - Cardiology consult for further management.  - History reviewed as below, past stress test abnl with evidence of infarct and shilo-infarct ischemia, LVEF 59%;   revealing hypokinesis of the basal inferior and basal septal walls and reduced systolic thickening of the basal to mid inferolateral, distal anterior, distal anteroseptal, and apical walls  -s/p cath with luminal LCx/Ramus disease.  Mild proximal LAD disease.   -Severely elevated PASP in setting of markedly elevated filling pressures/wedge pressure. Severely elevated PASP likely secondary to increased LA pressure.     He was pending JULIANO in past admission, which was canceled due to positive RVP.

## 2022-11-24 NOTE — H&P ADULT - PROBLEM SELECTOR PLAN 2
- Appreciated on admission CBC.   - Denied burning on urination, sore throat and productive cough.   - Chest xray with vascular congestion.   - Plan is to repeat cbc, and if productive cough or symptoms change, repeat chest xray to assess for post viral pneumonia.

## 2022-11-24 NOTE — H&P ADULT - NSHPADDITIONALINFOADULT_GEN_ALL_CORE
Patient examined at bedside. I was involved in the initial assessment and management this morning. Care and management to be continued by primary team and cardiologist.

## 2022-11-24 NOTE — ED ADULT TRIAGE NOTE - SOURCE OF INFORMATION
Price (Do Not Change): 0.00 Instructions: This plan will send the code [id number] to the PM system.  DO NOT or CHANGE the price. Detail Level: Simple Patient

## 2022-11-25 LAB
ALBUMIN SERPL ELPH-MCNC: 3.3 G/DL — SIGNIFICANT CHANGE UP (ref 3.3–5)
ALP SERPL-CCNC: 91 U/L — SIGNIFICANT CHANGE UP (ref 40–120)
ALT FLD-CCNC: 211 U/L — HIGH (ref 10–45)
ANION GAP SERPL CALC-SCNC: 10 MMOL/L — SIGNIFICANT CHANGE UP (ref 5–17)
AST SERPL-CCNC: 131 U/L — HIGH (ref 10–40)
BASOPHILS # BLD AUTO: 0.04 K/UL — SIGNIFICANT CHANGE UP (ref 0–0.2)
BASOPHILS NFR BLD AUTO: 0.5 % — SIGNIFICANT CHANGE UP (ref 0–2)
BILIRUB SERPL-MCNC: 0.9 MG/DL — SIGNIFICANT CHANGE UP (ref 0.2–1.2)
BUN SERPL-MCNC: 45 MG/DL — HIGH (ref 7–23)
CALCIUM SERPL-MCNC: 9.1 MG/DL — SIGNIFICANT CHANGE UP (ref 8.4–10.5)
CHLORIDE SERPL-SCNC: 104 MMOL/L — SIGNIFICANT CHANGE UP (ref 96–108)
CO2 SERPL-SCNC: 27 MMOL/L — SIGNIFICANT CHANGE UP (ref 22–31)
CREAT SERPL-MCNC: 1.36 MG/DL — HIGH (ref 0.5–1.3)
EGFR: 56 ML/MIN/1.73M2 — LOW
EOSINOPHIL # BLD AUTO: 0.1 K/UL — SIGNIFICANT CHANGE UP (ref 0–0.5)
EOSINOPHIL NFR BLD AUTO: 1.3 % — SIGNIFICANT CHANGE UP (ref 0–6)
GLUCOSE SERPL-MCNC: 99 MG/DL — SIGNIFICANT CHANGE UP (ref 70–99)
HCT VFR BLD CALC: 32.3 % — LOW (ref 39–50)
HGB BLD-MCNC: 10.7 G/DL — LOW (ref 13–17)
IMM GRANULOCYTES NFR BLD AUTO: 0.4 % — SIGNIFICANT CHANGE UP (ref 0–0.9)
LYMPHOCYTES # BLD AUTO: 2.19 K/UL — SIGNIFICANT CHANGE UP (ref 1–3.3)
LYMPHOCYTES # BLD AUTO: 27.6 % — SIGNIFICANT CHANGE UP (ref 13–44)
MAGNESIUM SERPL-MCNC: 2 MG/DL — SIGNIFICANT CHANGE UP (ref 1.6–2.6)
MCHC RBC-ENTMCNC: 32 PG — SIGNIFICANT CHANGE UP (ref 27–34)
MCHC RBC-ENTMCNC: 33.1 GM/DL — SIGNIFICANT CHANGE UP (ref 32–36)
MCV RBC AUTO: 96.7 FL — SIGNIFICANT CHANGE UP (ref 80–100)
MONOCYTES # BLD AUTO: 0.58 K/UL — SIGNIFICANT CHANGE UP (ref 0–0.9)
MONOCYTES NFR BLD AUTO: 7.3 % — SIGNIFICANT CHANGE UP (ref 2–14)
NEUTROPHILS # BLD AUTO: 4.99 K/UL — SIGNIFICANT CHANGE UP (ref 1.8–7.4)
NEUTROPHILS NFR BLD AUTO: 62.9 % — SIGNIFICANT CHANGE UP (ref 43–77)
NRBC # BLD: 0 /100 WBCS — SIGNIFICANT CHANGE UP (ref 0–0)
PLATELET # BLD AUTO: 179 K/UL — SIGNIFICANT CHANGE UP (ref 150–400)
POTASSIUM SERPL-MCNC: 3.6 MMOL/L — SIGNIFICANT CHANGE UP (ref 3.5–5.3)
POTASSIUM SERPL-SCNC: 3.6 MMOL/L — SIGNIFICANT CHANGE UP (ref 3.5–5.3)
PROT SERPL-MCNC: 6.5 G/DL — SIGNIFICANT CHANGE UP (ref 6–8.3)
RBC # BLD: 3.34 M/UL — LOW (ref 4.2–5.8)
RBC # FLD: 13.7 % — SIGNIFICANT CHANGE UP (ref 10.3–14.5)
SODIUM SERPL-SCNC: 141 MMOL/L — SIGNIFICANT CHANGE UP (ref 135–145)
WBC # BLD: 7.93 K/UL — SIGNIFICANT CHANGE UP (ref 3.8–10.5)
WBC # FLD AUTO: 7.93 K/UL — SIGNIFICANT CHANGE UP (ref 3.8–10.5)

## 2022-11-25 PROCEDURE — 93010 ELECTROCARDIOGRAM REPORT: CPT

## 2022-11-25 RX ADMIN — Medication 1000 UNIT(S): at 07:58

## 2022-11-25 RX ADMIN — TAMSULOSIN HYDROCHLORIDE 0.4 MILLIGRAM(S): 0.4 CAPSULE ORAL at 17:17

## 2022-11-25 RX ADMIN — BUMETANIDE 1 MILLIGRAM(S): 0.25 INJECTION INTRAMUSCULAR; INTRAVENOUS at 13:35

## 2022-11-25 RX ADMIN — Medication 50 MILLIGRAM(S): at 05:33

## 2022-11-25 RX ADMIN — BUMETANIDE 1 MILLIGRAM(S): 0.25 INJECTION INTRAMUSCULAR; INTRAVENOUS at 05:34

## 2022-11-25 RX ADMIN — RIVAROXABAN 15 MILLIGRAM(S): KIT at 17:17

## 2022-11-25 RX ADMIN — Medication 500 MILLIGRAM(S): at 07:58

## 2022-11-25 RX ADMIN — TAMSULOSIN HYDROCHLORIDE 0.4 MILLIGRAM(S): 0.4 CAPSULE ORAL at 05:33

## 2022-11-25 RX ADMIN — Medication 200 MILLIGRAM(S): at 07:56

## 2022-11-25 RX ADMIN — FINASTERIDE 5 MILLIGRAM(S): 5 TABLET, FILM COATED ORAL at 07:57

## 2022-11-25 NOTE — PROGRESS NOTE ADULT - SUBJECTIVE AND OBJECTIVE BOX
CARDIOLOGY FOLLOW UP - Dr. Carrera  Date of Service: 11/25/2022  CC: feels better, no cp/sob    Review of Systems:  Constitutional: No fever, weight loss, or fatigue  Respiratory: No cough, wheezing, or hemoptysis, no shortness of breath  Cardiovascular: No chest pain, palpitations, passing out, dizziness, or leg swelling  Gastrointestinal: No abd or epigastric pain. No nausea, vomiting, or hematemesis; no diarrhea or consiptaiton, no melena or hematochezia  Vascular: No edema     TELEMETRY:    PHYSICAL EXAM:  T(C): 36.5 (11-25-22 @ 05:34), Max: 36.7 (11-25-22 @ 04:13)  HR: 72 (11-25-22 @ 05:34) (61 - 74)  BP: 106/67 (11-25-22 @ 05:34) (98/60 - 122/66)  RR: 18 (11-25-22 @ 05:34) (18 - 18)  SpO2: 94% (11-25-22 @ 05:34) (94% - 98%)  Wt(kg): --  I&O's Summary    24 Nov 2022 07:01  -  25 Nov 2022 07:00  --------------------------------------------------------  IN: 270 mL / OUT: 2700 mL / NET: -2430 mL    25 Nov 2022 07:01  -  25 Nov 2022 14:24  --------------------------------------------------------  IN: 240 mL / OUT: 0 mL / NET: 240 mL        Appearance: Normal	  Cardiovascular: Normal S1 S2,RRR, No JVD, No murmurs  Respiratory: Lungs clear to auscultation	  Gastrointestinal:  Soft, Non-tender, + BS	  Extremities: Normal range of motion, No clubbing, cyanosis or edema  Vascular: Peripheral pulses palpable 2+ bilaterally       Home Medications:  finasteride 5 mg oral tablet: 1 tab(s) orally once a day (24 Nov 2022 10:35)  tamsulosin 0.4 mg oral capsule: 1 cap(s) orally 2 times a day (24 Nov 2022 10:35)  Vitamin C 1000 mg oral tablet: 1 tab(s) orally once a day (24 Nov 2022 10:35)  Vitamin D3 1000 intl units oral tablet: 1 tab(s) orally once a day (24 Nov 2022 10:35)        MEDICATIONS  (STANDING):  albuterol/ipratropium for Nebulization. 3 milliLiter(s) Nebulizer once  allopurinol 200 milliGRAM(s) Oral daily  ascorbic acid 500 milliGRAM(s) Oral daily  buMETAnide Injectable 1 milliGRAM(s) IV Push two times a day  cholecalciferol 1000 Unit(s) Oral daily  finasteride 5 milliGRAM(s) Oral daily  metoprolol succinate ER 50 milliGRAM(s) Oral daily  rivaroxaban 15 milliGRAM(s) Oral with dinner  tamsulosin 0.4 milliGRAM(s) Oral two times a day        EKG:  RADIOLOGY:  DIAGNOSTIC TESTING:  [ ] Echocardiogram:  [ ] Catherterization:  [ ] Stress Test:  OTHER:     LABS:	 	                          10.7   7.93  )-----------( 179      ( 25 Nov 2022 06:24 )             32.3     11-25    141  |  104  |  45<H>  ----------------------------<  99  3.6   |  27  |  1.36<H>    Ca    9.1      25 Nov 2022 06:24  Mg     2.0     11-25    TPro  6.5  /  Alb  3.3  /  TBili  0.9  /  DBili  x   /  AST  131<H>  /  ALT  211<H>  /  AlkPhos  91  11-25      PT/INR - ( 24 Nov 2022 04:05 )   PT: 22.0 sec;   INR: 1.88 ratio         PTT - ( 24 Nov 2022 04:05 )  PTT:34.2 sec    CARDIAC MARKERS:        Assessment and Recommendation:   · Assessment	  ECHO 11/9/22:  Ef 73%: mild MS, fx bioprosthe avr, nl LV sys fx   mod pulm pressure   Stress test 11/9/22: abnl with evidence of infarct and shilo-infarct ischemia, LVEF 59%;   revealing hypokinesis of the basal inferior and basal septal walls and reduced systolic thickening of the basal to mid inferolateral, distal anterior, distal anteroseptal, and apical walls.   Nuclear Stress Test-Pharmacologic (06.03.15 @ 12:15) >  ------------------------------------------------------------------------  IMPRESSIONS:Normal Study  * Baseline ECG: Nonspecific ST-T wave abnormality.  * ECG Changes: No ischemic ST segment changes.  * Arrhythmia: None.  * Review of raw data shows: Minor motion artifact.  * The left ventricle was enlarged. There is a small, mild  defect in the apex that is mostly fixed and demonstrates  preserved wall motion suggestive of attenuation artifact.  * Post-stress gated wall motion analysis was performed  (LVEF = 62 %;LVEDV = 136 ml.) revealing paradoxical septal  motion consistent with a post-operative state.  ------------------------------------------------------------------------    a/p     66 y/o male with hx of ascending aortic aneurysm repair, DVT ( two sepearte occasions ) was on Eliquis ( last used 6 months ago ) , sp  AVR ( bovine ) ( repaired twice ?),  s/p PPM (medtronic), HFpEF, AVF of right femoral artery, HTN, BPH, admitted with acute onset dyspnea    #acute on chronic DCHF, pulmonary edema  -recurrent HF, pulmonary edema   -was doing fine for a few days but had 1/2 can of sardines/+++salt intake few hours before onset of dyspnea  -previous CTA chest No pulmonary embolism  -no ACS  -RECENT echo with nl LV sys fx, nl fxn bio avr   -recent cath with luminal LCx/Ramus disease.  Mild proximal LAD disease.   -recent RHC with severely elevated PASP in setting of markedly elevated filling pressures/wedge pressure. 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 is AVF playing a role in HF -- RHc with low cardiac output     #PAF  -cont toprol   -sp recent PPM interrogation noted: Measured data WNL, normal pacemaker function, Pt is NOT pacemaker dependent ; Stored data revealed 3 episodes of AF lasting up to 13 hours, AF burden 0.4% as well as brief NSVT  -cont eliquis    #Hx dvt  -recent le doppler neg for dvt     #SP AVR   -stable on echo     #Sp PPM - [employer]   -ppm interrogation as above       35 minutes spent on total encounter; more than 50% of the visit was spent counseling and/or coordinating care by the attending physician.

## 2022-11-25 NOTE — PROGRESS NOTE ADULT - SUBJECTIVE AND OBJECTIVE BOX
SUBJECTIVE/ OVERNIGHT EVENTS:  No events.  Feel well.   breathing worse last night but now improved.   no cp, no sob, no n/v/d.  no abd pain.   JULIANO today         --------------------------------------------------------------------------------------------  LABS:                        10.7   7.93  )-----------( 179      ( 2022 06:24 )             32.3         141  |  104  |  45<H>  ----------------------------<  99  3.6   |  27  |  1.36<H>    Ca    9.1      2022 06:24  Mg     2.0         TPro  6.5  /  Alb  3.3  /  TBili  0.9  /  DBili  x   /  AST  131<H>  /  ALT  211<H>  /  AlkPhos  91      PT/INR - ( 2022 04:05 )   PT: 22.0 sec;   INR: 1.88 ratio         PTT - ( 2022 04:05 )  PTT:34.2 sec  CAPILLARY BLOOD GLUCOSE            Urinalysis Basic - ( 2022 22:28 )    Color: Light Yellow / Appearance: Clear / S.014 / pH: x  Gluc: x / Ketone: Negative  / Bili: Negative / Urobili: Negative   Blood: x / Protein: Trace / Nitrite: Negative   Leuk Esterase: Negative / RBC: 1 /hpf / WBC 1 /HPF   Sq Epi: x / Non Sq Epi: 2 /hpf / Bacteria: Negative        RADIOLOGY & ADDITIONAL TESTS:    Imaging Personally Reviewed:  [x] YES  [ ] NO    Consultant(s) Notes Reviewed:  [x] YES  [ ] NO    MEDICATIONS  (STANDING):  albuterol/ipratropium for Nebulization. 3 milliLiter(s) Nebulizer once  allopurinol 200 milliGRAM(s) Oral daily  ascorbic acid 500 milliGRAM(s) Oral daily  buMETAnide Injectable 1 milliGRAM(s) IV Push two times a day  cholecalciferol 1000 Unit(s) Oral daily  finasteride 5 milliGRAM(s) Oral daily  metoprolol succinate ER 50 milliGRAM(s) Oral daily  rivaroxaban 15 milliGRAM(s) Oral with dinner  tamsulosin 0.4 milliGRAM(s) Oral two times a day    MEDICATIONS  (PRN):      Care Discussed with Consultants/Other Providers [x] YES  [ ] NO    Vital Signs Last 24 Hrs  T(C): 36.5 (2022 05:34), Max: 36.7 (2022 04:13)  T(F): 97.7 (2022 05:34), Max: 98 (2022 04:13)  HR: 72 (:34) (61 - 74)  BP: 106/67 (2022 05:34) (98/60 - 122/66)  BP(mean): --  RR: 18 (:34) (18 - 18)  SpO2: 94% (:34) (94% - 98%)    Parameters below as of 2022 05:34  Patient On (Oxygen Delivery Method): nasal cannula  O2 Flow (L/min): 2    I&O's Summary    2022 07:  -  2022 07:00  --------------------------------------------------------  IN: 270 mL / OUT: 2700 mL / NET: -2430 mL    2022 07:01  -  2022 11:58  --------------------------------------------------------  IN: 240 mL / OUT: 0 mL / NET: 240 mL      PHYSICAL EXAM:  GENERAL: NAD, well-developed, comfortable  HEAD:  Atraumatic, Normocephalic  EYES: EOMI, PERRLA, conjunctiva and sclera clear  NECK: Supple, No JVD  CHEST/LUNG: mild decrease breath sounds bilaterally; No wheeze   HEART: Regular rate and rhythm; No murmurs, rubs, or gallops  ABDOMEN: Soft, Nontender, Nondistended; Bowel sounds present  Neuro: AAOx3, no focal weakness, 5/5 b/l extremity strength  EXTREMITIES:  2+ Peripheral Pulses, No clubbing, cyanosis, or edema  SKIN: No rashes or lesions

## 2022-11-26 LAB
ANION GAP SERPL CALC-SCNC: 15 MMOL/L — SIGNIFICANT CHANGE UP (ref 5–17)
BUN SERPL-MCNC: 46 MG/DL — HIGH (ref 7–23)
CALCIUM SERPL-MCNC: 9.4 MG/DL — SIGNIFICANT CHANGE UP (ref 8.4–10.5)
CHLORIDE SERPL-SCNC: 100 MMOL/L — SIGNIFICANT CHANGE UP (ref 96–108)
CO2 SERPL-SCNC: 24 MMOL/L — SIGNIFICANT CHANGE UP (ref 22–31)
CREAT SERPL-MCNC: 1.37 MG/DL — HIGH (ref 0.5–1.3)
EGFR: 56 ML/MIN/1.73M2 — LOW
GLUCOSE SERPL-MCNC: 143 MG/DL — HIGH (ref 70–99)
POTASSIUM SERPL-MCNC: 3.7 MMOL/L — SIGNIFICANT CHANGE UP (ref 3.5–5.3)
POTASSIUM SERPL-SCNC: 3.7 MMOL/L — SIGNIFICANT CHANGE UP (ref 3.5–5.3)
SODIUM SERPL-SCNC: 139 MMOL/L — SIGNIFICANT CHANGE UP (ref 135–145)

## 2022-11-26 RX ADMIN — Medication 50 MILLIGRAM(S): at 05:43

## 2022-11-26 RX ADMIN — FINASTERIDE 5 MILLIGRAM(S): 5 TABLET, FILM COATED ORAL at 12:10

## 2022-11-26 RX ADMIN — Medication 200 MILLIGRAM(S): at 12:11

## 2022-11-26 RX ADMIN — RIVAROXABAN 15 MILLIGRAM(S): KIT at 18:04

## 2022-11-26 RX ADMIN — TAMSULOSIN HYDROCHLORIDE 0.4 MILLIGRAM(S): 0.4 CAPSULE ORAL at 18:04

## 2022-11-26 RX ADMIN — BUMETANIDE 1 MILLIGRAM(S): 0.25 INJECTION INTRAMUSCULAR; INTRAVENOUS at 16:56

## 2022-11-26 RX ADMIN — Medication 1000 UNIT(S): at 12:12

## 2022-11-26 RX ADMIN — Medication 500 MILLIGRAM(S): at 12:11

## 2022-11-26 RX ADMIN — TAMSULOSIN HYDROCHLORIDE 0.4 MILLIGRAM(S): 0.4 CAPSULE ORAL at 05:43

## 2022-11-26 RX ADMIN — BUMETANIDE 1 MILLIGRAM(S): 0.25 INJECTION INTRAMUSCULAR; INTRAVENOUS at 05:44

## 2022-11-26 NOTE — PROVIDER CONTACT NOTE (OTHER) - SITUATION
Pt complaining of shortness of breath. Aleida came to assess the patient, and symptoms have resolved.
pt Complaint of SOB

## 2022-11-26 NOTE — PROGRESS NOTE ADULT - NSPROGADDITIONALINFOA_GEN_ALL_CORE
d/w pt and NP.  d/w RN.    - [facility] CHE[facility] ([facility]  - [phone] d/w pt and NP.  d/w RN.    --- Coverage for Dr. Polanco ---  - Dr. CHETAN Jackson ([facility])  - [phone]

## 2022-11-26 NOTE — PROGRESS NOTE ADULT - SUBJECTIVE AND OBJECTIVE BOX
CARDIOLOGY FOLLOW UP NOTE - DR. RIBEIRO    Patient Name: UMAIR LUGO  Date of Service: 11-26-22 @ 15:55    Patient seen and examined    Subjective:    cv: denies chest pain, dyspnea, palpitations, dizziness  pulmonary: denies cough  GI: denies abdominal pain, nausea, vomiting  vascular/legs: no edema   skin: no rash  ROS: otherwise negative   overnight events:      PHYSICAL EXAM:  T(C): 36.8 (11-26-22 @ 12:00), Max: 36.8 (11-26-22 @ 12:00)  HR: 81 (11-26-22 @ 12:00) (60 - 81)  BP: 110/62 (11-26-22 @ 12:00) (106/64 - 110/62)  RR: 19 (11-26-22 @ 12:00) (18 - 19)  SpO2: 95% (11-26-22 @ 12:00) (91% - 96%)  Wt(kg): --  I&O's Summary    25 Nov 2022 07:01  -  26 Nov 2022 07:00  --------------------------------------------------------  IN: 780 mL / OUT: 2550 mL / NET: -1770 mL    26 Nov 2022 07:01  -  26 Nov 2022 15:55  --------------------------------------------------------  IN: 318 mL / OUT: 575 mL / NET: -257 mL      Daily     Daily     Appearance: Normal	  Cardiovascular: Normal S1 S2,RRR, No JVD, No murmurs  Respiratory: Lungs clear to auscultation	  Gastrointestinal:  Soft, Non-tender, + BS	  Extremities: Normal range of motion, No clubbing, cyanosis or edema      Home Medications:  finasteride 5 mg oral tablet: 1 tab(s) orally once a day (24 Nov 2022 10:35)  tamsulosin 0.4 mg oral capsule: 1 cap(s) orally 2 times a day (24 Nov 2022 10:35)  Vitamin C 1000 mg oral tablet: 1 tab(s) orally once a day (24 Nov 2022 10:35)  Vitamin D3 1000 intl units oral tablet: 1 tab(s) orally once a day (24 Nov 2022 10:35)      MEDICATIONS  (STANDING):  albuterol/ipratropium for Nebulization. 3 milliLiter(s) Nebulizer once  allopurinol 200 milliGRAM(s) Oral daily  ascorbic acid 500 milliGRAM(s) Oral daily  buMETAnide Injectable 1 milliGRAM(s) IV Push two times a day  cholecalciferol 1000 Unit(s) Oral daily  finasteride 5 milliGRAM(s) Oral daily  metoprolol succinate ER 50 milliGRAM(s) Oral daily  rivaroxaban 15 milliGRAM(s) Oral with dinner  tamsulosin 0.4 milliGRAM(s) Oral two times a day      TELEMETRY: 	    ECG:  	  RADIOLOGY:   DIAGNOSTIC TESTING:  [ ] Echocardiogram:  [ ] Catheterization:  [ ] Stress Test:    OTHER: 	    LABS:	 	    CARDIAC MARKERS:        Troponin T, High Sensitivity Result: 32 ng/L (11-24 @ 05:31)  Troponin T, High Sensitivity Result: 32 ng/L (11-24 @ 04:05)                                10.7   7.93  )-----------( 179      ( 25 Nov 2022 06:24 )             32.3     11-26    139  |  100  |  46<H>  ----------------------------<  143<H>  3.7   |  24  |  1.37<H>    Ca    9.4      26 Nov 2022 07:10  Mg     2.0     11-25    TPro  6.5  /  Alb  3.3  /  TBili  0.9  /  DBili  x   /  AST  131<H>  /  ALT  211<H>  /  AlkPhos  91  11-25    proBNP:     Lipid Profile:   HgA1c:     Creatinine, Serum: 1.37 mg/dL (11-26-22 @ 07:10)  Creatinine, Serum: 1.36 mg/dL (11-25-22 @ 06:24)  Creatinine, Serum: 1.87 mg/dL (11-24-22 @ 04:05)          Assessment and Recommendation:   · Assessment	  ECHO 11/9/22:  Ef 73%: mild MS, fx bioprosthe avr, nl LV sys fx   mod pulm pressure   Stress test 11/9/22: abnl with evidence of infarct and shilo-infarct ischemia, LVEF 59%;   revealing hypokinesis of the basal inferior and basal septal walls and reduced systolic thickening of the basal to mid inferolateral, distal anterior, distal anteroseptal, and apical walls.   Nuclear Stress Test-Pharmacologic (06.03.15 @ 12:15) >  ------------------------------------------------------------------------  IMPRESSIONS:Normal Study  * Baseline ECG: Nonspecific ST-T wave abnormality.  * ECG Changes: No ischemic ST segment changes.  * Arrhythmia: None.  * Review of raw data shows: Minor motion artifact.  * The left ventricle was enlarged. There is a small, mild  defect in the apex that is mostly fixed and demonstrates  preserved wall motion suggestive of attenuation artifact.  * Post-stress gated wall motion analysis was performed  (LVEF = 62 %;LVEDV = 136 ml.) revealing paradoxical septal  motion consistent with a post-operative state.  ------------------------------------------------------------------------    a/p     68 y/o male with hx of ascending aortic aneurysm repair, DVT ( two sepearte occasions ) was on Eliquis ( last used 6 months ago ) , sp  AVR ( bovine ) ( repaired twice ?),  s/p PPM (medtronic), HFpEF, AVF of right femoral artery, HTN, BPH, admitted with acute onset dyspnea    #acute on chronic DCHF, pulmonary edema  -recurrent HF, pulmonary edema   -was doing fine for a few days but had 1/2 can of sardines/+++salt intake few hours before onset of dyspnea  -previous CTA chest No pulmonary embolism  -no ACS  -RECENT echo with nl LV sys fx, nl fxn bio avr   -recent cath with luminal LCx/Ramus disease.  Mild proximal LAD disease.   -recent RHC with severely elevated PASP in setting of markedly elevated filling pressures/wedge pressure. Severely elevated PASP likely secondary to increased LA pressure  + RVP recently   -vol statsu improved still with ++orthonea, chest imaging with pulmonary edema  -cont iv bumex as ordered bid   -cont toprol 50 mg daily  -recent bubble study ok  -DID NOT HAVE JULIANO FRIDAY, PLEASE MAKE NPO POST [place] SUNDAY FOR JULIANO MONDAY to eval TAVR/MV and r/o shunting in setting of recurrent HF, flash plumonary edema, hypoxia     #RLE  pseudoaneurysm/AV fistula   -during exam before attempting right femoral vein access for RHC --> incidental finding of bounding/pulsatile thrill  -Right fem artery was NOT accessed during cath   -Right LE Arterial doppler revealed  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 is AVF playing a role in HF -- RHc with low cardiac output     #PAF  -cont toprol   -sp recent PPM interrogation noted: Measured data WNL, normal pacemaker function, Pt is NOT pacemaker dependent ; Stored data revealed 3 episodes of AF lasting up to 13 hours, AF burden 0.4% as well as brief NSVT  -cont eliquis    #Hx dvt  -recent le doppler neg for dvt     #SP AVR   -stable on echo     #Sp PPM - [employer]   -ppm interrogation as above         35 minutes spent on total encounter; more than 50% of the visit was spent counseling and/or coordinating care by the attending physician.

## 2022-11-26 NOTE — PROVIDER CONTACT NOTE (OTHER) - ASSESSMENT
Vitals stable, pt is complaining of shortness of breath with no other symptoms. Pt is sitting comfortably in a chair and does not display and s/s of distress. Pt O2 sat is 96% on RA. 2LNC placed on pt to try and relieve symptoms.
pt appears mildly tachypnic, no retractions/accessory muscle use, SpO2 95% on RA, lungs clear and unchanged from AM assessment

## 2022-11-26 NOTE — PROGRESS NOTE ADULT - SUBJECTIVE AND OBJECTIVE BOX
SUBJECTIVE/ OVERNIGHT EVENTS:  still with signs of orthopnea   cannot lie flat yet. sit on chair   leg still with edema  dyspnea spells  feels okay this am  on IV diuretics  tele: no events  still pending JULIANO      --------------------------------------------------------------------------------------------  LABS:                        10.7   7.93  )-----------( 179      ( 2022 06:24 )             32.3         139  |  100  |  46<H>  ----------------------------<  143<H>  3.7   |  24  |  1.37<H>    Ca    9.4      2022 07:10  Mg     2.0         TPro  6.5  /  Alb  3.3  /  TBili  0.9  /  DBili  x   /  AST  131<H>  /  ALT  211<H>  /  AlkPhos  91        CAPILLARY BLOOD GLUCOSE            Urinalysis Basic - ( 2022 22:28 )    Color: Light Yellow / Appearance: Clear / S.014 / pH: x  Gluc: x / Ketone: Negative  / Bili: Negative / Urobili: Negative   Blood: x / Protein: Trace / Nitrite: Negative   Leuk Esterase: Negative / RBC: 1 /hpf / WBC 1 /HPF   Sq Epi: x / Non Sq Epi: 2 /hpf / Bacteria: Negative        RADIOLOGY & ADDITIONAL TESTS:    Imaging Personally Reviewed:  [x] YES  [ ] NO    Consultant(s) Notes Reviewed:  [x] YES  [ ] NO    MEDICATIONS  (STANDING):  albuterol/ipratropium for Nebulization. 3 milliLiter(s) Nebulizer once  allopurinol 200 milliGRAM(s) Oral daily  ascorbic acid 500 milliGRAM(s) Oral daily  buMETAnide Injectable 1 milliGRAM(s) IV Push two times a day  cholecalciferol 1000 Unit(s) Oral daily  finasteride 5 milliGRAM(s) Oral daily  metoprolol succinate ER 50 milliGRAM(s) Oral daily  rivaroxaban 15 milliGRAM(s) Oral with dinner  tamsulosin 0.4 milliGRAM(s) Oral two times a day    MEDICATIONS  (PRN):      Care Discussed with Consultants/Other Providers [x] YES  [ ] NO    Vital Signs Last 24 Hrs  T(C): 36.7 (2022 20:55), Max: 36.8 (2022 12:00)  T(F): 98.1 (2022 20:55), Max: 98.2 (2022 12:00)  HR: 73 (2022 20:55) (60 - 81)  BP: 104/62 (2022 20:55) (104/62 - 110/62)  BP(mean): --  RR: 18 (2022 20:55) (18 - 19)  SpO2: 98% (2022 20:55) (95% - 98%)    Parameters below as of 2022 20:55  Patient On (Oxygen Delivery Method): room air      I&O's Summary    2022 07:01  -  2022 07:00  --------------------------------------------------------  IN: 780 mL / OUT: 2550 mL / NET: -1770 mL    2022 07:01  -  2022 21:56  --------------------------------------------------------  IN: 318 mL / OUT: 575 mL / NET: -257 mL        PHYSICAL EXAM:  GENERAL: NAD, well-developed, comfortable  HEAD:  Atraumatic, Normocephalic  EYES: EOMI, PERRLA, conjunctiva and sclera clear  NECK: Supple, No JVD  CHEST/LUNG: mild decrease breath sounds bilaterally; No wheeze   HEART: Regular rate and rhythm; No murmurs, rubs, or gallops  ABDOMEN: Soft, Nontender, Nondistended; Bowel sounds present  Neuro: AAOx3, no focal weakness, 5/5 b/l extremity strength  EXTREMITIES:  2+ Peripheral Pulses, No clubbing, cyanosis, 1-2+ b/l edema  SKIN: No rashes or lesions    SUBJECTIVE/ OVERNIGHT EVENTS:  --- Coverage for Dr. Polanco ---  still with signs of orthopnea   cannot lie flat yet. sit on chair   leg still with edema  dyspnea spells  feels okay this am  on IV diuretics  tele: no events  still pending JULIANO      --------------------------------------------------------------------------------------------  LABS:                        10.7   7.93  )-----------( 179      ( 2022 06:24 )             32.3         139  |  100  |  46<H>  ----------------------------<  143<H>  3.7   |  24  |  1.37<H>    Ca    9.4      2022 07:10  Mg     2.0         TPro  6.5  /  Alb  3.3  /  TBili  0.9  /  DBili  x   /  AST  131<H>  /  ALT  211<H>  /  AlkPhos  91        CAPILLARY BLOOD GLUCOSE            Urinalysis Basic - ( 2022 22:28 )    Color: Light Yellow / Appearance: Clear / S.014 / pH: x  Gluc: x / Ketone: Negative  / Bili: Negative / Urobili: Negative   Blood: x / Protein: Trace / Nitrite: Negative   Leuk Esterase: Negative / RBC: 1 /hpf / WBC 1 /HPF   Sq Epi: x / Non Sq Epi: 2 /hpf / Bacteria: Negative        RADIOLOGY & ADDITIONAL TESTS:    Imaging Personally Reviewed:  [x] YES  [ ] NO    Consultant(s) Notes Reviewed:  [x] YES  [ ] NO    MEDICATIONS  (STANDING):  albuterol/ipratropium for Nebulization. 3 milliLiter(s) Nebulizer once  allopurinol 200 milliGRAM(s) Oral daily  ascorbic acid 500 milliGRAM(s) Oral daily  buMETAnide Injectable 1 milliGRAM(s) IV Push two times a day  cholecalciferol 1000 Unit(s) Oral daily  finasteride 5 milliGRAM(s) Oral daily  metoprolol succinate ER 50 milliGRAM(s) Oral daily  rivaroxaban 15 milliGRAM(s) Oral with dinner  tamsulosin 0.4 milliGRAM(s) Oral two times a day    MEDICATIONS  (PRN):      Care Discussed with Consultants/Other Providers [x] YES  [ ] NO    Vital Signs Last 24 Hrs  T(C): 36.7 (2022 20:55), Max: 36.8 (2022 12:00)  T(F): 98.1 (2022 20:55), Max: 98.2 (2022 12:00)  HR: 73 (2022 20:55) (60 - 81)  BP: 104/62 (2022 20:55) (104/62 - 110/62)  BP(mean): --  RR: 18 (2022 20:55) (18 - 19)  SpO2: 98% (2022 20:55) (95% - 98%)    Parameters below as of 2022 20:55  Patient On (Oxygen Delivery Method): room air      I&O's Summary    2022 07:01  -  2022 07:00  --------------------------------------------------------  IN: 780 mL / OUT: 2550 mL / NET: -1770 mL    2022 07:01  -  2022 21:56  --------------------------------------------------------  IN: 318 mL / OUT: 575 mL / NET: -257 mL        PHYSICAL EXAM:  GENERAL: NAD, well-developed, comfortable  HEAD:  Atraumatic, Normocephalic  EYES: EOMI, PERRLA, conjunctiva and sclera clear  NECK: Supple, No JVD  CHEST/LUNG: mild decrease breath sounds bilaterally; No wheeze   HEART: Regular rate and rhythm; No murmurs, rubs, or gallops  ABDOMEN: Soft, Nontender, Nondistended; Bowel sounds present  Neuro: AAOx3, no focal weakness, 5/5 b/l extremity strength  EXTREMITIES:  2+ Peripheral Pulses, No clubbing, cyanosis, 1-2+ b/l edema  SKIN: No rashes or lesions

## 2022-11-26 NOTE — PROVIDER CONTACT NOTE (OTHER) - ACTION/TREATMENT ORDERED:
NP evaluate pt CXR ordered, continue to monitor.
Neb treatment ordered. Notify provider if pt has another episode of shortness of breath.

## 2022-11-27 LAB — SARS-COV-2 RNA SPEC QL NAA+PROBE: SIGNIFICANT CHANGE UP

## 2022-11-27 PROCEDURE — 71046 X-RAY EXAM CHEST 2 VIEWS: CPT | Mod: 26

## 2022-11-27 RX ADMIN — RIVAROXABAN 15 MILLIGRAM(S): KIT at 17:31

## 2022-11-27 RX ADMIN — Medication 200 MILLIGRAM(S): at 11:15

## 2022-11-27 RX ADMIN — BUMETANIDE 1 MILLIGRAM(S): 0.25 INJECTION INTRAMUSCULAR; INTRAVENOUS at 06:26

## 2022-11-27 RX ADMIN — BUMETANIDE 1 MILLIGRAM(S): 0.25 INJECTION INTRAMUSCULAR; INTRAVENOUS at 15:21

## 2022-11-27 RX ADMIN — FINASTERIDE 5 MILLIGRAM(S): 5 TABLET, FILM COATED ORAL at 11:14

## 2022-11-27 RX ADMIN — TAMSULOSIN HYDROCHLORIDE 0.4 MILLIGRAM(S): 0.4 CAPSULE ORAL at 17:30

## 2022-11-27 RX ADMIN — TAMSULOSIN HYDROCHLORIDE 0.4 MILLIGRAM(S): 0.4 CAPSULE ORAL at 05:00

## 2022-11-27 RX ADMIN — Medication 500 MILLIGRAM(S): at 11:15

## 2022-11-27 RX ADMIN — Medication 50 MILLIGRAM(S): at 05:00

## 2022-11-27 RX ADMIN — Medication 1000 UNIT(S): at 11:15

## 2022-11-27 NOTE — PROGRESS NOTE ADULT - SUBJECTIVE AND OBJECTIVE BOX
CARDIOLOGY FOLLOW UP NOTE - DR. RIBEIRO    Patient Name: UMAIR LUGO  Date of Service: 11-27-22 @ 10:15    Patient seen and examined  orthopnea over night   today no dyspnea      Subjective:    cv: denies chest pain, dyspnea, palpitations, dizziness  pulmonary: denies cough  GI: denies abdominal pain, nausea, vomiting  vascular/legs: no edema   skin: no rash  ROS: otherwise negative   overnight events:      PHYSICAL EXAM:  T(C): 36.2 (11-27-22 @ 03:55), Max: 36.8 (11-26-22 @ 12:00)  HR: 71 (11-27-22 @ 06:32) (64 - 81)  BP: 106/62 (11-27-22 @ 06:32) (104/62 - 110/62)  RR: 18 (11-27-22 @ 03:55) (18 - 19)  SpO2: 97% (11-27-22 @ 03:55) (95% - 98%)  Wt(kg): --  I&O's Summary    26 Nov 2022 07:01  -  27 Nov 2022 07:00  --------------------------------------------------------  IN: 518 mL / OUT: 1325 mL / NET: -807 mL      Daily     Daily     Appearance: Normal	  Cardiovascular: Normal S1 S2,RRR, No JVD, No murmurs  Respiratory: Lungs clear to auscultation	ded bs bases  Gastrointestinal:  Soft, Non-tender, + BS	  Extremities: Normal range of motion, No clubbing, cyanosis or edema      Home Medications:  finasteride 5 mg oral tablet: 1 tab(s) orally once a day (24 Nov 2022 10:35)  tamsulosin 0.4 mg oral capsule: 1 cap(s) orally 2 times a day (24 Nov 2022 10:35)  Vitamin C 1000 mg oral tablet: 1 tab(s) orally once a day (24 Nov 2022 10:35)  Vitamin D3 1000 intl units oral tablet: 1 tab(s) orally once a day (24 Nov 2022 10:35)      MEDICATIONS  (STANDING):  albuterol/ipratropium for Nebulization. 3 milliLiter(s) Nebulizer once  allopurinol 200 milliGRAM(s) Oral daily  ascorbic acid 500 milliGRAM(s) Oral daily  buMETAnide Injectable 1 milliGRAM(s) IV Push two times a day  cholecalciferol 1000 Unit(s) Oral daily  finasteride 5 milliGRAM(s) Oral daily  metoprolol succinate ER 50 milliGRAM(s) Oral daily  rivaroxaban 15 milliGRAM(s) Oral with dinner  tamsulosin 0.4 milliGRAM(s) Oral two times a day      TELEMETRY: 	    ECG:  	  RADIOLOGY:   DIAGNOSTIC TESTING:  [ ] Echocardiogram:  [ ] Catheterization:  [ ] Stress Test:    OTHER: 	    LABS:	 	    CARDIAC MARKERS:        Troponin T, High Sensitivity Result: 32 ng/L (11-24 @ 05:31)  Troponin T, High Sensitivity Result: 32 ng/L (11-24 @ 04:05)            11-26    139  |  100  |  46<H>  ----------------------------<  143<H>  3.7   |  24  |  1.37<H>    Ca    9.4      26 Nov 2022 07:10      proBNP:     Lipid Profile:   HgA1c:     Creatinine, Serum: 1.37 mg/dL (11-26-22 @ 07:10)  Creatinine, Serum: 1.36 mg/dL (11-25-22 @ 06:24)          · Assessment	  ECHO 11/9/22:  Ef 73%: mild MS, fx bioprosthe avr, nl LV sys fx   mod pulm pressure   Stress test 11/9/22: abnl with evidence of infarct and shilo-infarct ischemia, LVEF 59%;   revealing hypokinesis of the basal inferior and basal septal walls and reduced systolic thickening of the basal to mid inferolateral, distal anterior, distal anteroseptal, and apical walls.   Nuclear Stress Test-Pharmacologic (06.03.15 @ 12:15) >  ------------------------------------------------------------------------  IMPRESSIONS:Normal Study  * Baseline ECG: Nonspecific ST-T wave abnormality.  * ECG Changes: No ischemic ST segment changes.  * Arrhythmia: None.  * Review of raw data shows: Minor motion artifact.  * The left ventricle was enlarged. There is a small, mild  defect in the apex that is mostly fixed and demonstrates  preserved wall motion suggestive of attenuation artifact.  * Post-stress gated wall motion analysis was performed  (LVEF = 62 %;LVEDV = 136 ml.) revealing paradoxical septal  motion consistent with a post-operative state.  ------------------------------------------------------------------------    a/p     66 y/o male with hx of ascending aortic aneurysm repair, DVT ( two sepearte occasions ) was on Eliquis ( last used 6 months ago ) , sp  AVR ( bovine ) ( repaired twice ?),  s/p PPM (medtronic), HFpEF, AVF of right femoral artery, HTN, BPH, admitted with acute onset dyspnea    #acute on chronic DCHF, pulmonary edema  -recurrent HF, pulmonary edema   -was doing fine for a few days but had 1/2 can of sardines/+++salt intake few hours before onset of dyspnea  -previous CTA chest No pulmonary embolism  -no ACS  -RECENT echo with nl LV sys fx, nl fxn bio avr   -recent cath with luminal LCx/Ramus disease.  Mild proximal LAD disease.   -recent RHC with severely elevated PASP in setting of markedly elevated filling pressures/wedge pressure. Severely elevated PASP likely secondary to increased LA pressure  + RVP recently   -vol status improved still with ++orthopnea, chest imaging with pulmonary edema  -cont iv bumex as ordered bid   -cont toprol 50 mg daily  -recent bubble study ok  -DID NOT HAVE JULIANO FRIDAY, PLEASE MAKE NPO POST MN FOR JULIANO CAITLIN  to eval TAVR/MV and r/o shunting in setting of recurrent HF, flash pulmonary edema, hypoxia   -? etiology of HF/pulmonary edema  ? underestimated valve disease ?infiltrative disease ? 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 is AVF playing a role in HF -- RHc with low cardiac output     #PAF  -cont toprol   -sp recent PPM interrogation noted: Measured data WNL, normal pacemaker function, Pt is NOT pacemaker dependent ; Stored data revealed 3 episodes of AF lasting up to 13 hours, AF burden 0.4% as well as brief NSVT  -cont eliquis    #Hx dvt  -recent le doppler neg for dvt     #SP AVR   -stable on echo     #Sp PPM - Medtronic   -ppm interrogation as above         35 minutes spent on total encounter; more than 50% of the visit was spent counseling and/or coordinating care by the attending physician.

## 2022-11-27 NOTE — PROGRESS NOTE ADULT - SUBJECTIVE AND OBJECTIVE BOX
SUBJECTIVE/ OVERNIGHT EVENTS:  --- Coverage for Dr. Polanco ---  no cp, no sob, no n/v/d. no abdominal pain.  no headache, no dizziness.   no dyspneic episode last night for the first time.  out of bed in chair  JULIANO tomorrow.   tele: no events  --------------------------------------------------------------------------------------------  LABS:    11-26    139  |  100  |  46<H>  ----------------------------<  143<H>  3.7   |  24  |  1.37<H>    Ca    9.4      26 Nov 2022 07:10        CAPILLARY BLOOD GLUCOSE                RADIOLOGY & ADDITIONAL TESTS:    Imaging Personally Reviewed:  [x] YES  [ ] NO    Consultant(s) Notes Reviewed:  [x] YES  [ ] NO    MEDICATIONS  (STANDING):  albuterol/ipratropium for Nebulization. 3 milliLiter(s) Nebulizer once  allopurinol 200 milliGRAM(s) Oral daily  ascorbic acid 500 milliGRAM(s) Oral daily  buMETAnide Injectable 1 milliGRAM(s) IV Push two times a day  cholecalciferol 1000 Unit(s) Oral daily  finasteride 5 milliGRAM(s) Oral daily  metoprolol succinate ER 50 milliGRAM(s) Oral daily  rivaroxaban 15 milliGRAM(s) Oral with dinner  tamsulosin 0.4 milliGRAM(s) Oral two times a day    MEDICATIONS  (PRN):      Care Discussed with Consultants/Other Providers [x] YES  [ ] NO    Vital Signs Last 24 Hrs  T(C): 36.3 (27 Nov 2022 11:15), Max: 36.3 (27 Nov 2022 11:15)  T(F): 97.3 (27 Nov 2022 11:15), Max: 97.3 (27 Nov 2022 11:15)  HR: 78 (27 Nov 2022 15:20) (64 - 78)  BP: 121/70 (27 Nov 2022 15:20) (105/61 - 121/70)  BP(mean): --  RR: 18 (27 Nov 2022 11:15) (18 - 18)  SpO2: 93% (27 Nov 2022 11:15) (93% - 97%)    Parameters below as of 27 Nov 2022 11:15  Patient On (Oxygen Delivery Method): room air      I&O's Summary    26 Nov 2022 07:01  -  27 Nov 2022 07:00  --------------------------------------------------------  IN: 518 mL / OUT: 1325 mL / NET: -807 mL    27 Nov 2022 07:01  -  27 Nov 2022 21:20  --------------------------------------------------------  IN: 780 mL / OUT: 2600 mL / NET: -1820 mL            PHYSICAL EXAM:  GENERAL: NAD, well-developed, comfortable  HEAD:  Atraumatic, Normocephalic  EYES: EOMI, PERRLA, conjunctiva and sclera clear  NECK: Supple, No JVD  CHEST/LUNG: mild decrease breath sounds bilaterally; No wheeze   HEART: Regular rate and rhythm; No murmurs, rubs, or gallops  ABDOMEN: Soft, Nontender, Nondistended; Bowel sounds present  Neuro: AAOx3, no focal weakness, 5/5 b/l extremity strength  EXTREMITIES:  2+ Peripheral Pulses, No clubbing, cyanosis, 1-2+ b/l edema  SKIN: No rashes or lesions

## 2022-11-27 NOTE — PROGRESS NOTE ADULT - NSPROGADDITIONALINFOA_GEN_ALL_CORE
d/w pt and NP.    --- Coverage for Dr. Polanco ---  - Dr. CHETAN Jackson ([facility])  - [phone]

## 2022-11-28 LAB
ANION GAP SERPL CALC-SCNC: 10 MMOL/L — SIGNIFICANT CHANGE UP (ref 5–17)
BUN SERPL-MCNC: 38 MG/DL — HIGH (ref 7–23)
CALCIUM SERPL-MCNC: 9.3 MG/DL — SIGNIFICANT CHANGE UP (ref 8.4–10.5)
CHLORIDE SERPL-SCNC: 100 MMOL/L — SIGNIFICANT CHANGE UP (ref 96–108)
CO2 SERPL-SCNC: 29 MMOL/L — SIGNIFICANT CHANGE UP (ref 22–31)
CREAT SERPL-MCNC: 1.17 MG/DL — SIGNIFICANT CHANGE UP (ref 0.5–1.3)
EGFR: 67 ML/MIN/1.73M2 — SIGNIFICANT CHANGE UP
GLUCOSE SERPL-MCNC: 115 MG/DL — HIGH (ref 70–99)
HCT VFR BLD CALC: 35.8 % — LOW (ref 39–50)
HGB BLD-MCNC: 11.8 G/DL — LOW (ref 13–17)
MCHC RBC-ENTMCNC: 32.2 PG — SIGNIFICANT CHANGE UP (ref 27–34)
MCHC RBC-ENTMCNC: 33 GM/DL — SIGNIFICANT CHANGE UP (ref 32–36)
MCV RBC AUTO: 97.5 FL — SIGNIFICANT CHANGE UP (ref 80–100)
NRBC # BLD: 0 /100 WBCS — SIGNIFICANT CHANGE UP (ref 0–0)
OB PNL STL: NEGATIVE — SIGNIFICANT CHANGE UP
PLATELET # BLD AUTO: 197 K/UL — SIGNIFICANT CHANGE UP (ref 150–400)
POTASSIUM SERPL-MCNC: 3.8 MMOL/L — SIGNIFICANT CHANGE UP (ref 3.5–5.3)
POTASSIUM SERPL-SCNC: 3.8 MMOL/L — SIGNIFICANT CHANGE UP (ref 3.5–5.3)
RBC # BLD: 3.67 M/UL — LOW (ref 4.2–5.8)
RBC # FLD: 14.4 % — SIGNIFICANT CHANGE UP (ref 10.3–14.5)
SODIUM SERPL-SCNC: 139 MMOL/L — SIGNIFICANT CHANGE UP (ref 135–145)
WBC # BLD: 7.89 K/UL — SIGNIFICANT CHANGE UP (ref 3.8–10.5)
WBC # FLD AUTO: 7.89 K/UL — SIGNIFICANT CHANGE UP (ref 3.8–10.5)

## 2022-11-28 PROCEDURE — 93312 ECHO TRANSESOPHAGEAL: CPT | Mod: 26

## 2022-11-28 PROCEDURE — 93320 DOPPLER ECHO COMPLETE: CPT | Mod: 26

## 2022-11-28 PROCEDURE — 93325 DOPPLER ECHO COLOR FLOW MAPG: CPT | Mod: 26

## 2022-11-28 PROCEDURE — 76377 3D RENDER W/INTRP POSTPROCES: CPT | Mod: 26

## 2022-11-28 PROCEDURE — 99223 1ST HOSP IP/OBS HIGH 75: CPT

## 2022-11-28 PROCEDURE — 93280 PM DEVICE PROGR EVAL DUAL: CPT | Mod: 26

## 2022-11-28 RX ADMIN — BUMETANIDE 1 MILLIGRAM(S): 0.25 INJECTION INTRAMUSCULAR; INTRAVENOUS at 05:36

## 2022-11-28 RX ADMIN — Medication 200 MILLIGRAM(S): at 12:41

## 2022-11-28 RX ADMIN — Medication 50 MILLIGRAM(S): at 05:36

## 2022-11-28 RX ADMIN — RIVAROXABAN 15 MILLIGRAM(S): KIT at 17:07

## 2022-11-28 RX ADMIN — BUMETANIDE 1 MILLIGRAM(S): 0.25 INJECTION INTRAMUSCULAR; INTRAVENOUS at 14:25

## 2022-11-28 RX ADMIN — Medication 500 MILLIGRAM(S): at 12:41

## 2022-11-28 RX ADMIN — TAMSULOSIN HYDROCHLORIDE 0.4 MILLIGRAM(S): 0.4 CAPSULE ORAL at 17:07

## 2022-11-28 RX ADMIN — Medication 1000 UNIT(S): at 12:41

## 2022-11-28 RX ADMIN — TAMSULOSIN HYDROCHLORIDE 0.4 MILLIGRAM(S): 0.4 CAPSULE ORAL at 05:36

## 2022-11-28 RX ADMIN — FINASTERIDE 5 MILLIGRAM(S): 5 TABLET, FILM COATED ORAL at 12:41

## 2022-11-28 NOTE — PROGRESS NOTE ADULT - SUBJECTIVE AND OBJECTIVE BOX
CARDIOLOGY FOLLOW UP - Dr. Carrera  DATE OF SERVICE: 11/28/22    CC  S/p JULIANO. No complaints, feels well. No CP or SOB    REVIEW OF SYSTEMS:  CONSTITUTIONAL: No fever, weight loss, or fatigue  RESPIRATORY: No cough, wheezing, chills or hemoptysis; No Shortness of Breath  CARDIOVASCULAR: No chest pain, palpitations, passing out, dizziness, or leg swelling  GASTROINTESTINAL: No abdominal or epigastric pain. No nausea, vomiting, or hematemesis; No diarrhea or constipation. No melena or hematochezia.  VASCULAR: No edema     PHYSICAL EXAM:  T(C): 36.5 (11-28-22 @ 12:25), Max: 37.4 (11-28-22 @ 10:00)  HR: 80 (11-28-22 @ 12:25) (60 - 80)  BP: 113/68 (11-28-22 @ 12:25) (97/35 - 121/70)  RR: 18 (11-28-22 @ 12:25) (16 - 18)  SpO2: 93% (11-28-22 @ 12:25) (93% - 96%)  Wt(kg): --  I&O's Summary    27 Nov 2022 07:01  -  28 Nov 2022 07:00  --------------------------------------------------------  IN: 780 mL / OUT: 3300 mL / NET: -2520 mL    28 Nov 2022 07:01  -  28 Nov 2022 13:40  --------------------------------------------------------  IN: 60 mL / OUT: 1500 mL / NET: -1440 mL        Appearance: Normal	  Cardiovascular: Normal S1 S2,RRR, No JVD, No murmurs  Respiratory: Lungs clear to auscultation	  Gastrointestinal:  Soft, Non-tender, + BS	  Extremities: Normal range of motion, No clubbing, cyanosis or edema      Home Medications:  finasteride 5 mg oral tablet: 1 tab(s) orally once a day (24 Nov 2022 10:35)  tamsulosin 0.4 mg oral capsule: 1 cap(s) orally 2 times a day (24 Nov 2022 10:35)  Vitamin C 1000 mg oral tablet: 1 tab(s) orally once a day (24 Nov 2022 10:35)  Vitamin D3 1000 intl units oral tablet: 1 tab(s) orally once a day (24 Nov 2022 10:35)      MEDICATIONS  (STANDING):  albuterol/ipratropium for Nebulization. 3 milliLiter(s) Nebulizer once  allopurinol 200 milliGRAM(s) Oral daily  ascorbic acid 500 milliGRAM(s) Oral daily  buMETAnide Injectable 1 milliGRAM(s) IV Push two times a day  cholecalciferol 1000 Unit(s) Oral daily  finasteride 5 milliGRAM(s) Oral daily  metoprolol succinate ER 50 milliGRAM(s) Oral daily  rivaroxaban 15 milliGRAM(s) Oral with dinner  tamsulosin 0.4 milliGRAM(s) Oral two times a day        TELEMETRY: NSR	    ECG:  	  RADIOLOGY:   DIAGNOSTIC TESTING:  [ ] Echocardiogram:  [ ]  Catheterization:  [ ] Stress Test:    OTHER: 	    LABS:	 	    Troponin T, High Sensitivity Result: 32 ng/L [0 - 51] (11-24 @ 05:31)  Troponin T, High Sensitivity Result: 32 ng/L [0 - 51] (11-24 @ 04:05)      11-28    139  |  100  |  38<H>  ----------------------------<  115<H>  3.8   |  29  |  1.17    Ca    9.3      28 Nov 2022 06:40        · Assessment	  ECHO 11/9/22:  Ef 73%: mild MS, fx bioprosthe avr, nl LV sys fx   mod pulm pressure   Stress test 11/9/22: abnl with evidence of infarct and shilo-infarct ischemia, LVEF 59%;   revealing hypokinesis of the basal inferior and basal septal walls and reduced systolic thickening of the basal to mid inferolateral, distal anterior, distal anteroseptal, and apical walls.   Nuclear Stress Test-Pharmacologic (06.03.15 @ 12:15) >  ------------------------------------------------------------------------  IMPRESSIONS:Normal Study  * Baseline ECG: Nonspecific ST-T wave abnormality.  * ECG Changes: No ischemic ST segment changes.  * Arrhythmia: None.  * Review of raw data shows: Minor motion artifact.  * The left ventricle was enlarged. There is a small, mild  defect in the apex that is mostly fixed and demonstrates  preserved wall motion suggestive of attenuation artifact.  * Post-stress gated wall motion analysis was performed  (LVEF = 62 %;LVEDV = 136 ml.) revealing paradoxical septal  motion consistent with a post-operative state.  ------------------------------------------------------------------------    a/p     66 y/o male with hx of ascending aortic aneurysm repair, DVT ( two sepearte occasions ) was on Eliquis ( last used 6 months ago ) , sp  AVR ( bovine ) ( repaired twice ?),  s/p PPM (medtronic), HFpEF, AVF of right femoral artery, HTN, BPH, admitted with acute onset dyspnea    #acute on chronic DCHF, pulmonary edema  -recurrent HF, pulmonary edema   -was doing fine for a few days but had 1/2 can of sardines/+++salt intake few hours before onset of dyspnea  -previous CTA chest No pulmonary embolism  -no ACS  -RECENT echo with nl LV sys fx, nl fxn bio avr   -recent cath with luminal LCx/Ramus disease.  Mild proximal LAD disease.   -recent RHC with severely elevated PASP in setting of markedly elevated filling pressures/wedge pressure. Severely elevated PASP likely secondary to increased LA pressure  + RVP recently   -vol status improved still with ++orthopnea/LE edema, chest imaging with pulmonary edema  -cont iv bumex as ordered bid   -cont toprol 50 mg daily  -recent bubble study ok  -JULIANO done today, pending results  -? etiology of HF/pulmonary edema  ? underestimated valve disease ?infiltrative disease ? 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 is AVF playing a role in HF -- RHc with low cardiac output     #PAF  -cont toprol   -sp recent PPM interrogation prior admission noted: Measured data WNL, normal pacemaker function, Pt is NOT pacemaker dependent ; Stored data revealed 3 episodes of AF lasting up to 13 hours, AF burden 0.4% as well as brief NSVT  -cont eliquis    #Hx dvt  -recent le doppler neg for dvt     #SP AVR   -stable on echo     #Sp PPM - Medtronic   -Recent ppm interrogation as mentioned above

## 2022-11-28 NOTE — PROGRESS NOTE ADULT - SUBJECTIVE AND OBJECTIVE BOX
JULIANO noted with severe paravalvular AI and moderate MR after significant net negative diuresis   TTE did not appreciate valve disease  this is likely the cause of recent acute onset HF/pulmonary edema     will continue iv diuretic s  will call CTS to evaluate sharron for reop AVR  will d/w surgery regarding role for any potential cath based treatments/TAVR but might not be an option given paravalvular nature    vascular surgery will need ot be deferred until after valve repair/replacement     will d/w patient sharron

## 2022-11-28 NOTE — PROGRESS NOTE ADULT - SUBJECTIVE AND OBJECTIVE BOX
Date of service: 11-28-22 @ 16:53      Patient is a 67y old  Male who presents with a chief complaint of Acute heart failure (28 Nov 2022 14:10)                                                               INTERVAL HPI/OVERNIGHT EVENTS:    REVIEW OF SYSTEMS:     CONSTITUTIONAL: No weakness, fevers or chills  EYES/ENT: No visual changes , no ear ache   NECK: No pain or stiffness  RESPIRATORY: No cough, wheezing,  No shortness of breath  CARDIOVASCULAR: No chest pain or palpitations  GASTROINTESTINAL: No abdominal pain  . No nausea, vomiting, or hematemesis; No diarrhea or constipation. No melena or hematochezia.  GENITOURINARY: No dysuria, frequency or hematuria  NEUROLOGICAL: No numbness or weakness  SKIN: No itching, burning, rashes, or lesions                                                                                                                                                                                                                                                                                 Medications:  MEDICATIONS  (STANDING):  albuterol/ipratropium for Nebulization. 3 milliLiter(s) Nebulizer once  allopurinol 200 milliGRAM(s) Oral daily  ascorbic acid 500 milliGRAM(s) Oral daily  buMETAnide Injectable 1 milliGRAM(s) IV Push two times a day  cholecalciferol 1000 Unit(s) Oral daily  finasteride 5 milliGRAM(s) Oral daily  metoprolol succinate ER 50 milliGRAM(s) Oral daily  rivaroxaban 15 milliGRAM(s) Oral with dinner  tamsulosin 0.4 milliGRAM(s) Oral two times a day    MEDICATIONS  (PRN):       Allergies    No Known Allergies    Intolerances      Vital Signs Last 24 Hrs  T(C): 36.5 (28 Nov 2022 12:25), Max: 37.4 (28 Nov 2022 10:00)  T(F): 97.7 (28 Nov 2022 12:25), Max: 99.4 (28 Nov 2022 10:00)  HR: 76 (28 Nov 2022 14:17) (60 - 80)  BP: 110/64 (28 Nov 2022 14:17) (97/35 - 113/68)  BP(mean): 56 (28 Nov 2022 08:29) (56 - 56)  RR: 18 (28 Nov 2022 12:25) (16 - 18)  SpO2: 93% (28 Nov 2022 12:25) (93% - 96%)    Parameters below as of 28 Nov 2022 12:25  Patient On (Oxygen Delivery Method): room air      CAPILLARY BLOOD GLUCOSE          11-27 @ 07:01 - 11-28 @ 07:00  --------------------------------------------------------  IN: 780 mL / OUT: 3300 mL / NET: -2520 mL    11-28 @ 07:01 - 11-28 @ 16:53  --------------------------------------------------------  IN: 240 mL / OUT: 1800 mL / NET: -1560 mL      Physical Exam:    Daily Height in cm: 182.88 (28 Nov 2022 08:29)    Daily   General:  Well appearing, NAD, not cachetic  HEENT:  Nonicteric, PERRLA  CV:  RRR, S1S2   Lungs:  CTA B/L, no wheezes, rales, rhonchi  Abdomen:  Soft, non-tender, no distended, positive BS  Extremities:  2+ pulses, no c/c, no edema  Skin:  Warm and dry, no rashes  :  No crystal  Neuro:  AAOx3, non-focal, grossly intact                                                                                                                                                                                                                                                                                                LABS:                            11-28    139  |  100  |  38<H>  ----------------------------<  115<H>  3.8   |  29  |  1.17    Ca    9.3      28 Nov 2022 06:40                         RADIOLOGY & ADDITIONAL TESTS         I personally reviewed: [  ]EKG   [  ]CXR    [  ] CT      A/P:         Discussed with :     Aarti consultants' Notes   Time spent :

## 2022-11-28 NOTE — CONSULT NOTE ADULT - ATTENDING COMMENTS
cardiology work up reveals severe AI.  cardiology rec eval by CT surg for redo valve prior to femoral avf/psa repair

## 2022-11-28 NOTE — PROCEDURE NOTE - ADDITIONAL PROCEDURE DETAILS
Indication for interrogation: Assess pacing percentage  Presenting rhythm: SR with ventricular pacing 70s  Measured data WNL, normal pacemaker function, Pt is pacemaker dependent  Stored data revealed known PAF with 19 episodes up to 11 hours 41 minutes, total burden 12.4%  AP: 1.3%  : 99.6% - EF 73% on recent echo (11/9/22)

## 2022-11-29 ENCOUNTER — APPOINTMENT (OUTPATIENT)
Dept: CT IMAGING | Facility: IMAGING CENTER | Age: 67
End: 2022-11-29

## 2022-11-29 DIAGNOSIS — T82.03XA LEAKAGE OF HEART VALVE PROSTHESIS, INITIAL ENCOUNTER: ICD-10-CM

## 2022-11-29 LAB
ANION GAP SERPL CALC-SCNC: 13 MMOL/L — SIGNIFICANT CHANGE UP (ref 5–17)
BASOPHILS # BLD AUTO: 0.06 K/UL — SIGNIFICANT CHANGE UP (ref 0–0.2)
BASOPHILS NFR BLD AUTO: 0.6 % — SIGNIFICANT CHANGE UP (ref 0–2)
BLD GP AB SCN SERPL QL: NEGATIVE — SIGNIFICANT CHANGE UP
BUN SERPL-MCNC: 39 MG/DL — HIGH (ref 7–23)
CALCIUM SERPL-MCNC: 9.5 MG/DL — SIGNIFICANT CHANGE UP (ref 8.4–10.5)
CHLORIDE SERPL-SCNC: 98 MMOL/L — SIGNIFICANT CHANGE UP (ref 96–108)
CO2 SERPL-SCNC: 27 MMOL/L — SIGNIFICANT CHANGE UP (ref 22–31)
CREAT SERPL-MCNC: 1.14 MG/DL — SIGNIFICANT CHANGE UP (ref 0.5–1.3)
EGFR: 70 ML/MIN/1.73M2 — SIGNIFICANT CHANGE UP
EOSINOPHIL # BLD AUTO: 0.21 K/UL — SIGNIFICANT CHANGE UP (ref 0–0.5)
EOSINOPHIL NFR BLD AUTO: 2.3 % — SIGNIFICANT CHANGE UP (ref 0–6)
GLUCOSE SERPL-MCNC: 132 MG/DL — HIGH (ref 70–99)
HCT VFR BLD CALC: 36 % — LOW (ref 39–50)
HGB BLD-MCNC: 11.8 G/DL — LOW (ref 13–17)
IMM GRANULOCYTES NFR BLD AUTO: 0.3 % — SIGNIFICANT CHANGE UP (ref 0–0.9)
LYMPHOCYTES # BLD AUTO: 2.41 K/UL — SIGNIFICANT CHANGE UP (ref 1–3.3)
LYMPHOCYTES # BLD AUTO: 26.1 % — SIGNIFICANT CHANGE UP (ref 13–44)
MCHC RBC-ENTMCNC: 31.9 PG — SIGNIFICANT CHANGE UP (ref 27–34)
MCHC RBC-ENTMCNC: 32.8 GM/DL — SIGNIFICANT CHANGE UP (ref 32–36)
MCV RBC AUTO: 97.3 FL — SIGNIFICANT CHANGE UP (ref 80–100)
MONOCYTES # BLD AUTO: 0.78 K/UL — SIGNIFICANT CHANGE UP (ref 0–0.9)
MONOCYTES NFR BLD AUTO: 8.4 % — SIGNIFICANT CHANGE UP (ref 2–14)
NEUTROPHILS # BLD AUTO: 5.76 K/UL — SIGNIFICANT CHANGE UP (ref 1.8–7.4)
NEUTROPHILS NFR BLD AUTO: 62.3 % — SIGNIFICANT CHANGE UP (ref 43–77)
NRBC # BLD: 0 /100 WBCS — SIGNIFICANT CHANGE UP (ref 0–0)
PLATELET # BLD AUTO: 193 K/UL — SIGNIFICANT CHANGE UP (ref 150–400)
POTASSIUM SERPL-MCNC: 3.9 MMOL/L — SIGNIFICANT CHANGE UP (ref 3.5–5.3)
POTASSIUM SERPL-SCNC: 3.9 MMOL/L — SIGNIFICANT CHANGE UP (ref 3.5–5.3)
RBC # BLD: 3.7 M/UL — LOW (ref 4.2–5.8)
RBC # FLD: 14.6 % — HIGH (ref 10.3–14.5)
RH IG SCN BLD-IMP: NEGATIVE — SIGNIFICANT CHANGE UP
SODIUM SERPL-SCNC: 138 MMOL/L — SIGNIFICANT CHANGE UP (ref 135–145)
WBC # BLD: 9.25 K/UL — SIGNIFICANT CHANGE UP (ref 3.8–10.5)
WBC # FLD AUTO: 9.25 K/UL — SIGNIFICANT CHANGE UP (ref 3.8–10.5)

## 2022-11-29 PROCEDURE — 99221 1ST HOSP IP/OBS SF/LOW 40: CPT

## 2022-11-29 PROCEDURE — 73706 CT ANGIO LWR EXTR W/O&W/DYE: CPT | Mod: 26,RT

## 2022-11-29 RX ORDER — HEPARIN SODIUM 5000 [USP'U]/ML
1200 INJECTION INTRAVENOUS; SUBCUTANEOUS
Qty: 25000 | Refills: 0 | Status: DISCONTINUED | OUTPATIENT
Start: 2022-11-29 | End: 2022-11-30

## 2022-11-29 RX ORDER — POTASSIUM CHLORIDE 20 MEQ
10 PACKET (EA) ORAL ONCE
Refills: 0 | Status: COMPLETED | OUTPATIENT
Start: 2022-11-29 | End: 2022-11-29

## 2022-11-29 RX ADMIN — Medication 50 MILLIGRAM(S): at 05:07

## 2022-11-29 RX ADMIN — HEPARIN SODIUM 12 UNIT(S)/HR: 5000 INJECTION INTRAVENOUS; SUBCUTANEOUS at 17:41

## 2022-11-29 RX ADMIN — Medication 10 MILLIEQUIVALENT(S): at 19:44

## 2022-11-29 RX ADMIN — TAMSULOSIN HYDROCHLORIDE 0.4 MILLIGRAM(S): 0.4 CAPSULE ORAL at 17:22

## 2022-11-29 RX ADMIN — BUMETANIDE 1 MILLIGRAM(S): 0.25 INJECTION INTRAMUSCULAR; INTRAVENOUS at 15:45

## 2022-11-29 RX ADMIN — TAMSULOSIN HYDROCHLORIDE 0.4 MILLIGRAM(S): 0.4 CAPSULE ORAL at 05:07

## 2022-11-29 RX ADMIN — Medication 1000 UNIT(S): at 12:50

## 2022-11-29 RX ADMIN — Medication 500 MILLIGRAM(S): at 12:50

## 2022-11-29 RX ADMIN — Medication 200 MILLIGRAM(S): at 12:50

## 2022-11-29 RX ADMIN — FINASTERIDE 5 MILLIGRAM(S): 5 TABLET, FILM COATED ORAL at 12:49

## 2022-11-29 RX ADMIN — BUMETANIDE 1 MILLIGRAM(S): 0.25 INJECTION INTRAMUSCULAR; INTRAVENOUS at 05:07

## 2022-11-29 NOTE — CONSULT NOTE ADULT - PROBLEM SELECTOR RECOMMENDATION 9
Preop Redo AVR and possible MVR with Dr. Mckeon   OR date - Monday 12/5   Preop orders pending  Stop Xarelto v Preop Redo AVR and possible MVR with Dr. Mckeon   OR date - Monday 12/5   Preop orders pending  Stop Xarelto - HELD   Start hepgtt  Continue diuresis with bumex as per Cards

## 2022-11-29 NOTE — DIETITIAN INITIAL EVALUATION ADULT - PERTINENT MEDS FT
MEDICATIONS  (STANDING):  albuterol/ipratropium for Nebulization. 3 milliLiter(s) Nebulizer once  allopurinol 200 milliGRAM(s) Oral daily  ascorbic acid 500 milliGRAM(s) Oral daily  buMETAnide Injectable 1 milliGRAM(s) IV Push two times a day  cholecalciferol 1000 Unit(s) Oral daily  finasteride 5 milliGRAM(s) Oral daily  metoprolol succinate ER 50 milliGRAM(s) Oral daily  tamsulosin 0.4 milliGRAM(s) Oral two times a day    MEDICATIONS  (PRN):

## 2022-11-29 NOTE — DIETITIAN INITIAL EVALUATION ADULT - NSFNSADHERENCEPTAFT_GEN_A_CORE
Pt reports not following any therapeutic diet restrictions PTA. Most recent HbA1c 5.8% indicating adequate glycemic control and Pt denies being on any diabetes medication regimen or checking fingersticks.

## 2022-11-29 NOTE — DIETITIAN INITIAL EVALUATION ADULT - ORAL INTAKE PTA/DIET HISTORY
Chart reviewed, events noted. Pt reports good appetite PTA - consuming 2-3 meals/day. Tries to "stay away from garbage." Denies issues chewing/swallowing. NKFA. UBW ~225 lb.

## 2022-11-29 NOTE — DIETITIAN INITIAL EVALUATION ADULT - ENERGY INTAKE
Currently Pt reports "lousy" appetite while in the hospital due to food dislike/having no taste. Despite lack of appetite, Pt reports he is eating the meals and consuming ~75% of most meals. Pt states he calls down to diet office to order based on his preferences. Noted excess beverages at bedside, stressed importance of watching fluid intake despite not being on an ordered fluid restriction. Pt not overly interested in diet instruction, but accepted heart failure nutrition therapy handouts and brief verbal instruction provided. Adequate (%)

## 2022-11-29 NOTE — DIETITIAN INITIAL EVALUATION ADULT - REASON FOR ADMISSION
Per chart, Pt is a 68 y/o M with PMH: "HTN,Gout,HLD, Mild CAD, diastolic CHF, AAA s/p repair, DVT x 2 separate episodes (s/p Eliquis), s/p AVR (Bovine), s/p medtronic PPM,  BPH, hx of paroxysmal atrial fibrillation on xarelto, possible AV fistula in his Right groin who presents for shortness of breath, endorses occasional cough and mild leg swelling. He was previously admitted on 11/8/22 for acute CHF c/b entero/rhino virus infection. Found with acute decompensated diastolic heart failure."

## 2022-11-29 NOTE — DIETITIAN INITIAL EVALUATION ADULT - PERSON TAUGHT/METHOD
Heart failure education provided to the patient in detail. Discussed heart failure nutrition therapy, sodium and fluid intake, importance of diet adherence, daily weights monitoring with the patient. Reinforced importance of weight gain parameters and importance of contacting MD’s about weight changes./verbal instruction/written material/individual instruction/patient instructed

## 2022-11-29 NOTE — DIETITIAN INITIAL EVALUATION ADULT - ADD RECOMMEND
Iona [phone].    Iona can be contacted at [phone] to discuss.    1) continue DASH diet  2) Defer to MD team for fluids  3) Continue vitC and VitD micronutrient supplementation  4) Daily wts  5) Reinforce heart healthy diet education at f/u and continue to encourage compliance  6) Monitor PO intake, weight, labs, skin, GI status, diet

## 2022-11-29 NOTE — PROGRESS NOTE ADULT - SUBJECTIVE AND OBJECTIVE BOX
HPI:  Patient is a 68 yo Man with pmhx of HTN,Gout,HLD, Mild CAD, diastolic CHF, AAA s/p repair, DVT x 2 separate episodes (s/p Eliquis), s/p AVR (Bovine), s/p medtronic PPM,  BPH, hx of paroxysmal atrial fibrillation on xarelto, possible AV fistula in his Right groin who presents for shortness of breath  He was watching tv and he started feeling short of breath. This prompted him and his son to bring him to the ER. He states he has been taking medications as prescribed. He did not miss a dose. He had stopped aspirin, presumably due to mild CAD. He endorses occasional cough, and mild leg swelling. He was previously admitted on 22 for acute CHF c/b entero/rhino virus infection. He went to work on Friday after discharge. And the following Monday went to see his PCP.      (2022 09:53)      REVIEW OF SYSTEMS:    CONSTITUTIONAL: No weakness, fevers or chills  EYES/ENT: No visual changes;  No vertigo or throat pain   NECK: No pain or stiffness  RESPIRATORY: No cough, wheezing, hemoptysis; No shortness of breath  CARDIOVASCULAR: No chest pain or palpitations  GASTROINTESTINAL: No abdominal or epigastric pain. No nausea, vomiting, or hematemesis; No diarrhea or constipation. No melena or hematochezia.  GENITOURINARY: No dysuria, frequency or hematuria  NEUROLOGICAL: No numbness or weakness  SKIN: No itching, burning, rashes, or lesions   All other review of systems is negative unless indicated above.      Medications:   MEDICATIONS  (STANDING):  albuterol/ipratropium for Nebulization. 3 milliLiter(s) Nebulizer once  allopurinol 200 milliGRAM(s) Oral daily  ascorbic acid 500 milliGRAM(s) Oral daily  buMETAnide Injectable 1 milliGRAM(s) IV Push two times a day  cholecalciferol 1000 Unit(s) Oral daily  finasteride 5 milliGRAM(s) Oral daily  heparin  Infusion 1200 Unit(s)/Hr (12 mL/Hr) IV Continuous <Continuous>  metoprolol succinate ER 50 milliGRAM(s) Oral daily  tamsulosin 0.4 milliGRAM(s) Oral two times a day    MEDICATIONS  (PRN):      Allergies    No Known Allergies    Intolerances        PAST MEDICAL & SURGICAL HISTORY:  Hypertension      History of BPH      Cardiac murmur      Malignant melanoma  left back      Cardiac pacemaker  2013      History of heart valve replacement  , , porcine      S/P abdominal aortic aneurysm repair            Social :    No smoking       No ETOH use            Vital Signs Last 24 Hrs  T(C): 36.4 (2022 12:17), Max: 36.5 (2022 04:22)  T(F): 97.5 (:), Max: 97.7 (2022 04:22)  HR: 71 (:) (67 - 78)  BP: 99/61 (:17) (99/61 - 110/48)  BP(mean): --  RR: 18 (:) (18 - 18)  SpO2: 94% (:17) (94% - 97%)    Parameters below as of 2022 12:17  Patient On (Oxygen Delivery Method): room air      CAPILLARY BLOOD GLUCOSE           @ : @ 07:00  --------------------------------------------------------  IN: 660 mL / OUT: 3600 mL / NET: -2940 mL     @ 07: @ 16:13  --------------------------------------------------------  IN: 420 mL / OUT: 750 mL / NET: -330 mL        Physical Exam:    Daily     Daily Weight in k.7 (2022 04:22)  General:  Well appearing, NAD, not cachetic  HEENT:  Nonicteric, PERRLA  CV:  RRR, no murmur, no JVD  Lungs:  CTA B/L, no wheezes, rales, rhonchi  Abdomen:  Soft, non-tender, no distended, positive BS, no hepatosplenomegaly  Extremities:  2+ pulses, no c/c, no edema  Skin:  Warm and dry, no rashes  :  No marah  Neuro:  AAOx3, non-focal, CN II-XII grossly intact  No Restraints    LABS:                        11.8   9.25  )-----------( 193      ( 2022 06:33 )             36.0     11    138  |  98  |  39<H>  ----------------------------<  132<H>  3.9   |  27  |  1.14    Ca    9.5      2022 06:33                  RADIOLOGY & ADDITIONAL TESTS:    ---------------------------------------------------------------------------  I personally reviewed: [  ]EKG   [  ]CXR    [  ] CT    [  ]Other  ---------------------------------------------------------------------------

## 2022-11-29 NOTE — DIETITIAN INITIAL EVALUATION ADULT - PERTINENT LABORATORY DATA
11-29    138  |  98  |  39<H>  ----------------------------<  132<H>  3.9   |  27  |  1.14    Ca    9.5      29 Nov 2022 06:33    A1C with Estimated Average Glucose Result: 5.8 % (11-10-22 @ 06:56)

## 2022-11-29 NOTE — PROGRESS NOTE ADULT - SUBJECTIVE AND OBJECTIVE BOX
CARDIOLOGY FOLLOW UP - Dr. Carrera  DATE OF SERVICE:    CC      REVIEW OF SYSTEMS:  CONSTITUTIONAL: No fever, weight loss, or fatigue  RESPIRATORY: No cough, wheezing, chills or hemoptysis; No Shortness of Breath  CARDIOVASCULAR: No chest pain, palpitations, passing out, dizziness, or leg swelling  GASTROINTESTINAL: No abdominal or epigastric pain. No nausea, vomiting, or hematemesis; No diarrhea or constipation. No melena or hematochezia.  VASCULAR: No edema     PHYSICAL EXAM:  T(C): 36.5 (11-29-22 @ 04:22), Max: 37.4 (11-28-22 @ 10:00)  HR: 78 (11-29-22 @ 04:22) (60 - 80)  BP: 100/59 (11-29-22 @ 04:22) (97/35 - 113/68)  RR: 18 (11-29-22 @ 04:22) (16 - 18)  SpO2: 94% (11-29-22 @ 04:22) (93% - 97%)  Wt(kg): --  I&O's Summary    28 Nov 2022 07:01  -  29 Nov 2022 07:00  --------------------------------------------------------  IN: 660 mL / OUT: 3600 mL / NET: -2940 mL        Appearance: Normal	  Cardiovascular: Normal S1 S2,RRR, No JVD, No murmurs  Respiratory: Lungs clear to auscultation	  Gastrointestinal:  Soft, Non-tender, + BS	  Extremities: Normal range of motion, No clubbing, cyanosis or edema      Home Medications:  finasteride 5 mg oral tablet: 1 tab(s) orally once a day (24 Nov 2022 10:35)  tamsulosin 0.4 mg oral capsule: 1 cap(s) orally 2 times a day (24 Nov 2022 10:35)  Vitamin C 1000 mg oral tablet: 1 tab(s) orally once a day (24 Nov 2022 10:35)  Vitamin D3 1000 intl units oral tablet: 1 tab(s) orally once a day (24 Nov 2022 10:35)      MEDICATIONS  (STANDING):  albuterol/ipratropium for Nebulization. 3 milliLiter(s) Nebulizer once  allopurinol 200 milliGRAM(s) Oral daily  ascorbic acid 500 milliGRAM(s) Oral daily  buMETAnide Injectable 1 milliGRAM(s) IV Push two times a day  cholecalciferol 1000 Unit(s) Oral daily  finasteride 5 milliGRAM(s) Oral daily  metoprolol succinate ER 50 milliGRAM(s) Oral daily  rivaroxaban 15 milliGRAM(s) Oral with dinner  tamsulosin 0.4 milliGRAM(s) Oral two times a day      TELEMETRY: 	    ECG:  	  RADIOLOGY:   DIAGNOSTIC TESTING:  [ ] Echocardiogram:  [ ]  Catheterization:  [ ] Stress Test:    OTHER:   < from: Transesophageal Echocardiogram w/o TTE (11.28.22 @ 11:50) >  Conclusions:  1. Thickened mitral valve. Moderate mitral regurgitation.  Two jets are seen one lateral P1/P2 0.17 sqcm by 3D Vena  contracta area and one more  medial P3 which measures  0.12sqcm.  2. Bioprosthetic aortic valve. Peak transaortic valve  gradient equals 25 mm Hg, mean transaortic valve gradient  equals 16 mm Hg, which is probably normal in the presence  of a bioprosthetic aortic valve. Moderate-Severe eccentric  aortic regurgitation which appears paravalvular at 11-1pm  on short axis.  3. Mild atheroma noted in aortic arch/descending aorta.  There is echogenic material seen in the proximal ascending  aorta consistant with prior hemiarch replacement.  4.  No left atrium or left atrial appendage thrombus.  5. Normal left ventricular internal dimensions and wall  thicknesses.  6. Normal left ventricular systolic function.  7. Right ventricular enlargement with mildly decreased  right ventricular systolic function.  8. Normal tricuspid valve. Mild-moderate tricuspid  regurgitation.  9. Estimated pulmonary artery systolic pressure equals 53  mm Hg, assuming right atrial pressure equals 8 mm Hg,  consistent with moderate pulmonary pressures.      LABS:	 	    Troponin T, High Sensitivity Result: 32 ng/L [0 - 51] (11-24 @ 05:31)  Troponin T, High Sensitivity Result: 32 ng/L [0 - 51] (11-24 @ 04:05)                          11.8   9.25  )-----------( 193      ( 29 Nov 2022 06:33 )             36.0     11-29    138  |  98  |  39<H>  ----------------------------<  132<H>  3.9   |  27  |  1.14    Ca    9.5      29 Nov 2022 06:33      A/P     66 y/o male with hx of ascending aortic aneurysm repair, DVT ( two sepearte occasions ) was on Eliquis ( last used 6 months ago ) , sp  AVR ( bovine ) ( repaired twice ?),  s/p PPM (medtronic), HFpEF, AVF of right femoral artery, HTN, BPH, admitted with acute onset dyspnea    #acute on chronic DCHF, pulmonary edema  -recurrent HF, pulmonary edema likely d/t  severe paravalvular AI and moderate MR as seen on JULIANO  -was doing fine for a few days but had 1/2 can of sardines/+++salt intake few hours before onset of dyspnea  -previous CTA chest No pulmonary embolism  -no ACS  -RECENT echo with nl LV sys fx, nl fxn bio avr   -recent cath with luminal LCx/Ramus disease.  Mild proximal LAD disease.   -recent RHC with severely elevated PASP in setting of markedly elevated filling pressures/wedge pressure. Severely elevated PASP likely secondary to increased LA pressure  + RVP recently   -recent bubble study ok  -vol status improved still with ++orthopnea/LE edema, chest imaging with pulmonary edema  - PPM interrogation done yesterday - noted w/ normal pacemaker fxn  - JULIANO w/ with severe paravalvular AI and moderate MR   - Will need CT sx consult for possible re-op of AR  -cont iv bumex as ordered bid   -cont toprol 50 mg daily    #RLE  pseudoaneurysm/AV fistula   -during exam before attempting right femoral vein access for RHC --> incidental finding of bounding/pulsatile thrill  -Right fem artery was NOT accessed during cath   -Right LE Arterial doppler revealed  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 is AVF playing a role in HF -- RHc with low cardiac output     #PAF  -cont toprol   -sp recent PPM interrogation prior admission noted: Measured data WNL, normal pacemaker function, Pt is NOT pacemaker dependent ; Stored data revealed 3 episodes of AF lasting up to 13 hours, AF burden 0.4% as well as brief NSVT  -cont eliquis    #Hx dvt  -recent le doppler neg for dvt     #SP AVR   -stable on echo     #Sp PPM - Medtronic   -Recent ppm interrogation as mentioned above               CARDIOLOGY FOLLOW UP - Dr. Carrera  DATE OF SERVICE: 11/29/22    CC  Sitting up in bed, no new complaints. Had some orthopnea overnight, otherwise feeling well. Presently no CP or SOB. JULIANO findings discussed.    REVIEW OF SYSTEMS:  CONSTITUTIONAL: No fever, weight loss, or fatigue  RESPIRATORY: No cough, wheezing, chills or hemoptysis; No Shortness of Breath  CARDIOVASCULAR: No chest pain, palpitations, passing out, dizziness, or leg swelling  GASTROINTESTINAL: No abdominal or epigastric pain. No nausea, vomiting, or hematemesis; No diarrhea or constipation. No melena or hematochezia.  VASCULAR: No edema     PHYSICAL EXAM:  T(C): 36.5 (11-29-22 @ 04:22), Max: 37.4 (11-28-22 @ 10:00)  HR: 78 (11-29-22 @ 04:22) (60 - 80)  BP: 100/59 (11-29-22 @ 04:22) (97/35 - 113/68)  RR: 18 (11-29-22 @ 04:22) (16 - 18)  SpO2: 94% (11-29-22 @ 04:22) (93% - 97%)  Wt(kg): --  I&O's Summary    28 Nov 2022 07:01  -  29 Nov 2022 07:00  --------------------------------------------------------  IN: 660 mL / OUT: 3600 mL / NET: -2940 mL        Appearance: Normal	  Cardiovascular: Normal S1 S2,RRR, No JVD, No murmurs  Respiratory: Lungs clear to auscultation	  Gastrointestinal:  Soft, Non-tender, + BS	  Extremities: Normal range of motion, No clubbing, cyanosis or edema      Home Medications:  finasteride 5 mg oral tablet: 1 tab(s) orally once a day (24 Nov 2022 10:35)  tamsulosin 0.4 mg oral capsule: 1 cap(s) orally 2 times a day (24 Nov 2022 10:35)  Vitamin C 1000 mg oral tablet: 1 tab(s) orally once a day (24 Nov 2022 10:35)  Vitamin D3 1000 intl units oral tablet: 1 tab(s) orally once a day (24 Nov 2022 10:35)      MEDICATIONS  (STANDING):  albuterol/ipratropium for Nebulization. 3 milliLiter(s) Nebulizer once  allopurinol 200 milliGRAM(s) Oral daily  ascorbic acid 500 milliGRAM(s) Oral daily  buMETAnide Injectable 1 milliGRAM(s) IV Push two times a day  cholecalciferol 1000 Unit(s) Oral daily  finasteride 5 milliGRAM(s) Oral daily  metoprolol succinate ER 50 milliGRAM(s) Oral daily  rivaroxaban 15 milliGRAM(s) Oral with dinner  tamsulosin 0.4 milliGRAM(s) Oral two times a day      TELEMETRY: 	    ECG:  	  RADIOLOGY:   DIAGNOSTIC TESTING:  [ ] Echocardiogram:  [ ]  Catheterization:  [ ] Stress Test:      OTHER:   < from: Transesophageal Echocardiogram w/o TTE (11.28.22 @ 11:50) >  Conclusions:  1. Thickened mitral valve. Moderate mitral regurgitation.  Two jets are seen one lateral P1/P2 0.17 sqcm by 3D Vena  contracta area and one more  medial P3 which measures  0.12sqcm.  2. Bioprosthetic aortic valve. Peak transaortic valve  gradient equals 25 mm Hg, mean transaortic valve gradient  equals 16 mm Hg, which is probably normal in the presence  of a bioprosthetic aortic valve. Moderate-Severe eccentric  aortic regurgitation which appears paravalvular at 11-1pm  on short axis.  3. Mild atheroma noted in aortic arch/descending aorta.  There is echogenic material seen in the proximal ascending  aorta consistant with prior hemiarch replacement.  4.  No left atrium or left atrial appendage thrombus.  5. Normal left ventricular internal dimensions and wall  thicknesses.  6. Normal left ventricular systolic function.  7. Right ventricular enlargement with mildly decreased  right ventricular systolic function.  8. Normal tricuspid valve. Mild-moderate tricuspid  regurgitation.  9. Estimated pulmonary artery systolic pressure equals 53  mm Hg, assuming right atrial pressure equals 8 mm Hg,  consistent with moderate pulmonary pressures.      LABS:	 	    Troponin T, High Sensitivity Result: 32 ng/L [0 - 51] (11-24 @ 05:31)  Troponin T, High Sensitivity Result: 32 ng/L [0 - 51] (11-24 @ 04:05)                          11.8   9.25  )-----------( 193      ( 29 Nov 2022 06:33 )             36.0     11-29    138  |  98  |  39<H>  ----------------------------<  132<H>  3.9   |  27  |  1.14    Ca    9.5      29 Nov 2022 06:33      A/P     68 y/o male with hx of ascending aortic aneurysm repair, DVT ( two sepearte occasions ) was on Eliquis ( last used 6 months ago ) , sp  AVR ( bovine ) ( repaired twice ?),  s/p PPM (medtronic), HFpEF, AVF of right femoral artery, HTN, BPH, admitted with acute onset dyspnea    #acute on chronic DCHF, pulmonary edema  -recurrent HF, pulmonary edema likely d/t  severe paravalvular AI and moderate MR as seen on JULIANO  -was doing fine for a few days but had 1/2 can of sardines/+++salt intake few hours before onset of dyspnea  -previous CTA chest No pulmonary embolism  -no ACS  -RECENT echo with nl LV sys fx, nl fxn bio avr   -recent cath with luminal LCx/Ramus disease.  Mild proximal LAD disease.   -recent RHC with severely elevated PASP in setting of markedly elevated filling pressures/wedge pressure. Severely elevated PASP likely secondary to increased LA pressure  + RVP recently   -recent bubble study ok  -Chest imaging c/w pulm edema  -Volume status improved but still w/ orthopnea overnight  - PPM interrogation done yesterday - noted w/ normal pacemaker fxn  - JULIANO w/ with severe paravalvular AI and moderate MR   - Will need CT sx consult for possible re-op of AR  - cont iv bumex as ordered bid   - cont toprol 50 mg daily    #RLE  pseudoaneurysm/AV fistula   -during exam before attempting right femoral vein access for RHC --> incidental finding of bounding/pulsatile thrill  -Right fem artery was NOT accessed during cath   -Right LE Arterial doppler revealed  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 is AVF playing a role in HF -- RHc with low cardiac output     #PAF  -cont toprol   -sp recent PPM interrogation prior admission noted: Measured data WNL, normal pacemaker function, Pt is NOT pacemaker dependent ; Stored data revealed 3 episodes of AF lasting up to 13 hours, AF burden 0.4% as well as brief NSVT  -cont eliquis    #Hx dvt  -recent le doppler neg for dvt     #SP AVR   -stable on echo     #Sp PPM - Medtronic   -Recent ppm interrogation as mentioned above               CARDIOLOGY FOLLOW UP - Dr. Carrera  DATE OF SERVICE: 11/29/22    CC  Sitting up in bed, no new complaints. Had some orthopnea overnight, otherwise feeling well. Presently no CP or SOB. JULIANO findings discussed.    REVIEW OF SYSTEMS:  CONSTITUTIONAL: No fever, weight loss, or fatigue  RESPIRATORY: No cough, wheezing, chills or hemoptysis; No Shortness of Breath  CARDIOVASCULAR: No chest pain, palpitations, passing out, dizziness, or leg swelling  GASTROINTESTINAL: No abdominal or epigastric pain. No nausea, vomiting, or hematemesis; No diarrhea or constipation. No melena or hematochezia.  VASCULAR: No edema     PHYSICAL EXAM:  T(C): 36.5 (11-29-22 @ 04:22), Max: 37.4 (11-28-22 @ 10:00)  HR: 78 (11-29-22 @ 04:22) (60 - 80)  BP: 100/59 (11-29-22 @ 04:22) (97/35 - 113/68)  RR: 18 (11-29-22 @ 04:22) (16 - 18)  SpO2: 94% (11-29-22 @ 04:22) (93% - 97%)  Wt(kg): --  I&O's Summary    28 Nov 2022 07:01  -  29 Nov 2022 07:00  --------------------------------------------------------  IN: 660 mL / OUT: 3600 mL / NET: -2940 mL        Appearance: Normal	  Cardiovascular: Normal S1 S2,RRR, No JVD, No murmurs  Respiratory: Lungs clear to auscultation	  Gastrointestinal:  Soft, Non-tender, + BS	  Extremities: Normal range of motion, No clubbing, cyanosis or edema      Home Medications:  finasteride 5 mg oral tablet: 1 tab(s) orally once a day (24 Nov 2022 10:35)  tamsulosin 0.4 mg oral capsule: 1 cap(s) orally 2 times a day (24 Nov 2022 10:35)  Vitamin C 1000 mg oral tablet: 1 tab(s) orally once a day (24 Nov 2022 10:35)  Vitamin D3 1000 intl units oral tablet: 1 tab(s) orally once a day (24 Nov 2022 10:35)      MEDICATIONS  (STANDING):  albuterol/ipratropium for Nebulization. 3 milliLiter(s) Nebulizer once  allopurinol 200 milliGRAM(s) Oral daily  ascorbic acid 500 milliGRAM(s) Oral daily  buMETAnide Injectable 1 milliGRAM(s) IV Push two times a day  cholecalciferol 1000 Unit(s) Oral daily  finasteride 5 milliGRAM(s) Oral daily  metoprolol succinate ER 50 milliGRAM(s) Oral daily  rivaroxaban 15 milliGRAM(s) Oral with dinner  tamsulosin 0.4 milliGRAM(s) Oral two times a day      TELEMETRY: 	    ECG:  	  RADIOLOGY:   DIAGNOSTIC TESTING:  [ ] Echocardiogram:  [ ]  Catheterization:  [ ] Stress Test:      OTHER:   < from: Transesophageal Echocardiogram w/o TTE (11.28.22 @ 11:50) >  Conclusions:  1. Thickened mitral valve. Moderate mitral regurgitation.  Two jets are seen one lateral P1/P2 0.17 sqcm by 3D Vena  contracta area and one more  medial P3 which measures  0.12sqcm.  2. Bioprosthetic aortic valve. Peak transaortic valve  gradient equals 25 mm Hg, mean transaortic valve gradient  equals 16 mm Hg, which is probably normal in the presence  of a bioprosthetic aortic valve. Moderate-Severe eccentric  aortic regurgitation which appears paravalvular at 11-1pm  on short axis.  3. Mild atheroma noted in aortic arch/descending aorta.  There is echogenic material seen in the proximal ascending  aorta consistant with prior hemiarch replacement.  4.  No left atrium or left atrial appendage thrombus.  5. Normal left ventricular internal dimensions and wall  thicknesses.  6. Normal left ventricular systolic function.  7. Right ventricular enlargement with mildly decreased  right ventricular systolic function.  8. Normal tricuspid valve. Mild-moderate tricuspid  regurgitation.  9. Estimated pulmonary artery systolic pressure equals 53  mm Hg, assuming right atrial pressure equals 8 mm Hg,  consistent with moderate pulmonary pressures.      LABS:	 	    Troponin T, High Sensitivity Result: 32 ng/L [0 - 51] (11-24 @ 05:31)  Troponin T, High Sensitivity Result: 32 ng/L [0 - 51] (11-24 @ 04:05)                          11.8   9.25  )-----------( 193      ( 29 Nov 2022 06:33 )             36.0     11-29    138  |  98  |  39<H>  ----------------------------<  132<H>  3.9   |  27  |  1.14    Ca    9.5      29 Nov 2022 06:33      A/P     66 y/o male with hx of ascending aortic aneurysm repair, DVT ( two sepearte occasions ) was on Eliquis ( last used 6 months ago ) , sp  AVR ( bovine ) ( repaired twice ?),  s/p PPM (medtronic), HFpEF, AVF of right femoral artery, HTN, BPH, admitted with acute onset dyspnea    #acute on chronic DCHF, pulmonary edema  -recurrent HF, pulmonary edema likely d/t  severe paravalvular AI and moderate MR as seen on JULIANO  -was doing fine for a few days but had 1/2 can of sardines/+++salt intake few hours before onset of dyspnea  -previous CTA chest No pulmonary embolism  -no ACS  -RECENT echo with nl LV sys fx, nl fxn bio avr   -recent cath with luminal LCx/Ramus disease.  Mild proximal LAD disease.   -recent RHC with severely elevated PASP in setting of markedly elevated filling pressures/wedge pressure. Severely elevated PASP likely secondary to increased LA pressure  + RVP recently   -recent bubble study ok  -Repeat chest imaging with improved pulmonary edema  -Volume status improved but still w/ orthopnea overnight  -PPM interrogation done yesterday - noted w/ normal pacemaker fxn  -JULIANO w/ with severe paravalvular AI and moderate MR   -Will need CT sx consult for possible re-op of AR  -cont iv bumex as ordered bid   -cont toprol 50 mg daily    #Aortic Insufficiency/Moderate MR  -JULIANO results as mentioned above  -CTS consult    #RLE pseudoaneurysm/AV fistula   -during exam before attempting right femoral vein access for RHC --> incidental finding of bounding/pulsatile thrill  -Right fem artery was NOT accessed during cath   -Right LE Arterial doppler revealed  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 Data within normal pacemaking function. Pt with known afib  -cont eliquis    #Hx dvt  -recent le doppler neg for dvt     #SP AVR   -stable on echo     #Sp PPM - [employer]tr[employer]   -Recent ppm interrogation as mentioned above

## 2022-11-29 NOTE — DIETITIAN INITIAL EVALUATION ADULT - OTHER INFO
Current Dosing Wt: 99.8 kg (11/28)  Reported UBW: 102.2 kg  Previous admission Wt: 102.11 kg (11/14) *Anticipate wt loss 2/2 IV diuresis

## 2022-11-30 ENCOUNTER — APPOINTMENT (OUTPATIENT)
Dept: CARE COORDINATION | Facility: HOME HEALTH | Age: 67
End: 2022-11-30

## 2022-11-30 LAB
ANION GAP SERPL CALC-SCNC: 9 MMOL/L — SIGNIFICANT CHANGE UP (ref 5–17)
APTT BLD: 38.1 SEC — HIGH (ref 27.5–35.5)
APTT BLD: 50.2 SEC — HIGH (ref 27.5–35.5)
BUN SERPL-MCNC: 36 MG/DL — HIGH (ref 7–23)
CALCIUM SERPL-MCNC: 9.3 MG/DL — SIGNIFICANT CHANGE UP (ref 8.4–10.5)
CHLORIDE SERPL-SCNC: 100 MMOL/L — SIGNIFICANT CHANGE UP (ref 96–108)
CO2 SERPL-SCNC: 30 MMOL/L — SIGNIFICANT CHANGE UP (ref 22–31)
CREAT SERPL-MCNC: 1.15 MG/DL — SIGNIFICANT CHANGE UP (ref 0.5–1.3)
EGFR: 69 ML/MIN/1.73M2 — SIGNIFICANT CHANGE UP
GLUCOSE SERPL-MCNC: 113 MG/DL — HIGH (ref 70–99)
HCT VFR BLD CALC: 34.3 % — LOW (ref 39–50)
HCT VFR BLD CALC: 35 % — LOW (ref 39–50)
HGB BLD-MCNC: 11.4 G/DL — LOW (ref 13–17)
HGB BLD-MCNC: 11.6 G/DL — LOW (ref 13–17)
MAGNESIUM SERPL-MCNC: 2.2 MG/DL — SIGNIFICANT CHANGE UP (ref 1.6–2.6)
MCHC RBC-ENTMCNC: 31.8 PG — SIGNIFICANT CHANGE UP (ref 27–34)
MCHC RBC-ENTMCNC: 32 PG — SIGNIFICANT CHANGE UP (ref 27–34)
MCHC RBC-ENTMCNC: 33.1 GM/DL — SIGNIFICANT CHANGE UP (ref 32–36)
MCHC RBC-ENTMCNC: 33.2 GM/DL — SIGNIFICANT CHANGE UP (ref 32–36)
MCV RBC AUTO: 95.9 FL — SIGNIFICANT CHANGE UP (ref 80–100)
MCV RBC AUTO: 96.3 FL — SIGNIFICANT CHANGE UP (ref 80–100)
NRBC # BLD: 0 /100 WBCS — SIGNIFICANT CHANGE UP (ref 0–0)
NRBC # BLD: 0 /100 WBCS — SIGNIFICANT CHANGE UP (ref 0–0)
PLATELET # BLD AUTO: 172 K/UL — SIGNIFICANT CHANGE UP (ref 150–400)
PLATELET # BLD AUTO: 178 K/UL — SIGNIFICANT CHANGE UP (ref 150–400)
POTASSIUM SERPL-MCNC: 3.9 MMOL/L — SIGNIFICANT CHANGE UP (ref 3.5–5.3)
POTASSIUM SERPL-SCNC: 3.9 MMOL/L — SIGNIFICANT CHANGE UP (ref 3.5–5.3)
RBC # BLD: 3.56 M/UL — LOW (ref 4.2–5.8)
RBC # BLD: 3.65 M/UL — LOW (ref 4.2–5.8)
RBC # FLD: 14.6 % — HIGH (ref 10.3–14.5)
RBC # FLD: 14.6 % — HIGH (ref 10.3–14.5)
SODIUM SERPL-SCNC: 139 MMOL/L — SIGNIFICANT CHANGE UP (ref 135–145)
WBC # BLD: 8.04 K/UL — SIGNIFICANT CHANGE UP (ref 3.8–10.5)
WBC # BLD: 8.18 K/UL — SIGNIFICANT CHANGE UP (ref 3.8–10.5)
WBC # FLD AUTO: 8.04 K/UL — SIGNIFICANT CHANGE UP (ref 3.8–10.5)
WBC # FLD AUTO: 8.18 K/UL — SIGNIFICANT CHANGE UP (ref 3.8–10.5)

## 2022-11-30 PROCEDURE — 75572 CT HRT W/3D IMAGE: CPT | Mod: 26

## 2022-11-30 RX ORDER — ENOXAPARIN SODIUM 100 MG/ML
100 INJECTION SUBCUTANEOUS EVERY 12 HOURS
Refills: 0 | Status: DISCONTINUED | OUTPATIENT
Start: 2022-11-30 | End: 2022-12-04

## 2022-11-30 RX ORDER — HEPARIN SODIUM 5000 [USP'U]/ML
1350 INJECTION INTRAVENOUS; SUBCUTANEOUS
Qty: 25000 | Refills: 0 | Status: DISCONTINUED | OUTPATIENT
Start: 2022-11-30 | End: 2022-11-30

## 2022-11-30 RX ADMIN — Medication 50 MILLIGRAM(S): at 05:36

## 2022-11-30 RX ADMIN — Medication 500 MILLIGRAM(S): at 11:38

## 2022-11-30 RX ADMIN — HEPARIN SODIUM 13 UNIT(S)/HR: 5000 INJECTION INTRAVENOUS; SUBCUTANEOUS at 08:57

## 2022-11-30 RX ADMIN — BUMETANIDE 1 MILLIGRAM(S): 0.25 INJECTION INTRAMUSCULAR; INTRAVENOUS at 14:25

## 2022-11-30 RX ADMIN — HEPARIN SODIUM 13 UNIT(S)/HR: 5000 INJECTION INTRAVENOUS; SUBCUTANEOUS at 01:00

## 2022-11-30 RX ADMIN — TAMSULOSIN HYDROCHLORIDE 0.4 MILLIGRAM(S): 0.4 CAPSULE ORAL at 18:02

## 2022-11-30 RX ADMIN — HEPARIN SODIUM 13.5 UNIT(S)/HR: 5000 INJECTION INTRAVENOUS; SUBCUTANEOUS at 11:36

## 2022-11-30 RX ADMIN — Medication 1000 UNIT(S): at 11:38

## 2022-11-30 RX ADMIN — Medication 200 MILLIGRAM(S): at 11:38

## 2022-11-30 RX ADMIN — ENOXAPARIN SODIUM 100 MILLIGRAM(S): 100 INJECTION SUBCUTANEOUS at 21:03

## 2022-11-30 RX ADMIN — FINASTERIDE 5 MILLIGRAM(S): 5 TABLET, FILM COATED ORAL at 11:37

## 2022-11-30 RX ADMIN — BUMETANIDE 1 MILLIGRAM(S): 0.25 INJECTION INTRAMUSCULAR; INTRAVENOUS at 05:36

## 2022-11-30 RX ADMIN — TAMSULOSIN HYDROCHLORIDE 0.4 MILLIGRAM(S): 0.4 CAPSULE ORAL at 05:35

## 2022-11-30 NOTE — PROGRESS NOTE ADULT - SUBJECTIVE AND OBJECTIVE BOX
Date of service: 22 @ 16:13      Patient is a 67y old  Male who presents with a chief complaint of Acute heart failure (2022 10:54)                                                               INTERVAL HPI/OVERNIGHT EVENTS:    REVIEW OF SYSTEMS:     CONSTITUTIONAL: No weakness, fevers or chills  EYES/ENT: No visual changes , no ear ache   NECK: No pain or stiffness  RESPIRATORY: No cough, wheezing,  No shortness of breath  CARDIOVASCULAR: No chest pain or palpitations  GASTROINTESTINAL: No abdominal pain  . No nausea, vomiting, or hematemesis; No diarrhea or constipation. No melena or hematochezia.  GENITOURINARY: No dysuria, frequency or hematuria  NEUROLOGICAL: No numbness or weakness  SKIN: No itching, burning, rashes, or lesions                                                                                                                                                                                                                                                                                 Medications:  MEDICATIONS  (STANDING):  albuterol/ipratropium for Nebulization. 3 milliLiter(s) Nebulizer once  allopurinol 200 milliGRAM(s) Oral daily  ascorbic acid 500 milliGRAM(s) Oral daily  buMETAnide Injectable 1 milliGRAM(s) IV Push two times a day  cholecalciferol 1000 Unit(s) Oral daily  enoxaparin Injectable 100 milliGRAM(s) SubCutaneous every 12 hours  finasteride 5 milliGRAM(s) Oral daily  metoprolol succinate ER 50 milliGRAM(s) Oral daily  tamsulosin 0.4 milliGRAM(s) Oral two times a day    MEDICATIONS  (PRN):       Allergies    No Known Allergies    Intolerances      Vital Signs Last 24 Hrs  T(C): 36.7 (2022 12:24), Max: 36.7 (2022 21:10)  T(F): 98 (2022 12:24), Max: 98 (2022 21:10)  HR: 80 (2022 12:24) (76 - 80)  BP: 104/62 (2022 12:24) (102/60 - 105/66)  BP(mean): --  RR: 18 (2022 12:24) (18 - 18)  SpO2: 94% (2022 12:24) (92% - 95%)    Parameters below as of 2022 12:24  Patient On (Oxygen Delivery Method): room air      CAPILLARY BLOOD GLUCOSE           @ 07:01  -   @ 07:00  --------------------------------------------------------  IN: 810 mL / OUT: 1400 mL / NET: -590 mL     @ 07:01  -   @ 16:13  --------------------------------------------------------  IN: 420 mL / OUT: 1150 mL / NET: -730 mL      Physical Exam:    Daily     Daily Weight in k.7 (2022 04:48)  General:  Well appearing, NAD, not cachetic  HEENT:  Nonicteric, PERRLA  CV:  RRR, S1S2   Lungs:  CTA B/L, no wheezes, rales, rhonchi  Abdomen:  Soft, non-tender, no distended, positive BS  Extremities: trace edmea   Neuro:  AAOx3, non-focal, grossly intact                                                                                                                                                                                                                                                                                                LABS:                               11.6   8.18  )-----------( 172      ( 2022 07:04 )             35.0                          139  |  100  |  36<H>  ----------------------------<  113<H>  3.9   |  30  |  1.15    Ca    9.3      2022 07:04  Mg     2.2                              RADIOLOGY & ADDITIONAL TESTS         I personally reviewed: [  ]EKG   [  ]CXR    [  ] CT      A/P:         Discussed with :     Aarti consultants' Notes   Time spent :

## 2022-11-30 NOTE — PROGRESS NOTE ADULT - SUBJECTIVE AND OBJECTIVE BOX
CARDIOLOGY FOLLOW UP - Dr. Carrera  DATE OF SERVICE: 11/29/22    CC  Pt feels well. No orthopnea last night. Denies CP, SOB, palpitations    REVIEW OF SYSTEMS:  CONSTITUTIONAL: No fever, weight loss, or fatigue  RESPIRATORY: No cough, wheezing, chills or hemoptysis; No Shortness of Breath  CARDIOVASCULAR: No chest pain, palpitations, passing out, dizziness, or leg swelling  GASTROINTESTINAL: No abdominal or epigastric pain. No nausea, vomiting, or hematemesis; No diarrhea or constipation. No melena or hematochezia.  VASCULAR: No edema     PHYSICAL EXAM:  T(C): 36.4 (11-30-22 @ 04:48), Max: 36.7 (11-29-22 @ 21:10)  HR: 79 (11-30-22 @ 04:48) (71 - 79)  BP: 102/60 (11-30-22 @ 04:48) (99/61 - 105/66)  RR: 18 (11-30-22 @ 04:48) (18 - 18)  SpO2: 92% (11-30-22 @ 04:48) (92% - 95%)  Wt(kg): --  I&O's Summary    29 Nov 2022 07:01  -  30 Nov 2022 07:00  --------------------------------------------------------  IN: 810 mL / OUT: 1400 mL / NET: -590 mL        Appearance: Normal	  Cardiovascular: Normal S1 S2,RRR, No JVD, No murmurs  Respiratory: Lungs clear to auscultation	  Gastrointestinal:  Soft, Non-tender, + BS	  Extremities: Normal range of motion, No clubbing, cyanosis or edema      Home Medications:  finasteride 5 mg oral tablet: 1 tab(s) orally once a day (24 Nov 2022 10:35)  tamsulosin 0.4 mg oral capsule: 1 cap(s) orally 2 times a day (24 Nov 2022 10:35)  Vitamin C 1000 mg oral tablet: 1 tab(s) orally once a day (24 Nov 2022 10:35)  Vitamin D3 1000 intl units oral tablet: 1 tab(s) orally once a day (24 Nov 2022 10:35)      MEDICATIONS  (STANDING):  albuterol/ipratropium for Nebulization. 3 milliLiter(s) Nebulizer once  allopurinol 200 milliGRAM(s) Oral daily  ascorbic acid 500 milliGRAM(s) Oral daily  buMETAnide Injectable 1 milliGRAM(s) IV Push two times a day  cholecalciferol 1000 Unit(s) Oral daily  finasteride 5 milliGRAM(s) Oral daily  heparin  Infusion 1350 Unit(s)/Hr (13.5 mL/Hr) IV Continuous <Continuous>  metoprolol succinate ER 50 milliGRAM(s) Oral daily  tamsulosin 0.4 milliGRAM(s) Oral two times a day      TELEMETRY: vpaced 70-80	    ECG:  	  RADIOLOGY:   DIAGNOSTIC TESTING:  [ ] Echocardiogram:  [ ]  Catheterization:  [ ] Stress Test:    OTHER: 	    LABS:	 	    Troponin T, High Sensitivity Result: 32 ng/L [0 - 51] (11-24 @ 05:31)  Troponin T, High Sensitivity Result: 32 ng/L [0 - 51] (11-24 @ 04:05)                          11.6   8.18  )-----------( 172      ( 30 Nov 2022 07:04 )             35.0     11-30    139  |  100  |  36<H>  ----------------------------<  113<H>  3.9   |  30  |  1.15    Ca    9.3      30 Nov 2022 07:04  Mg     2.2     11-30      PTT - ( 30 Nov 2022 07:04 )  PTT:50.2 sec        JULIANO 11/28: Mod-severe paravalvular leak of the BioAVR and mod MR    A/P  68 y/o male with hx of ascending aortic aneurysm repair, DVT ( two sepearte occasions ) was on Eliquis ( last used 6 months ago ) , sp  AVR ( bovine ) ( repaired twice ?),  s/p PPM (medtronic), HFpEF, AVF of right femoral artery, HTN, BPH, admitted with acute onset dyspnea    #acute on chronic DCHF, pulmonary edema  -recurrent HF, pulmonary edema likely d/t  severe paravalvular AI and moderate MR as seen on JULIANO  -was doing fine for a few days but had 1/2 can of sardines/+++salt intake few hours before onset of dyspnea  -previous CTA chest No pulmonary embolism  -no ACS  -RECENT echo with nl LV sys fx, nl fxn bio avr   -recent cath with luminal LCx/Ramus disease.  Mild proximal LAD disease.   -recent RHC with severely elevated PASP in setting of markedly elevated filling pressures/wedge pressure. Severely elevated PASP likely secondary to increased LA pressure  + RVP recently   -recent bubble study ok  -Repeat chest imaging with improved pulmonary edema  -Volume status improved & improvement in orthopnea  -PPM interrogation 11/28 - noted w/ normal pacemaker fxn  -JULIANO w/ with severe paravalvular AI and moderate MR   -Appreciate CT sx consult  -Plan for redo AVR and Poss MVR on Monday vs ? TAVR. F/u further CT sx recs  -cont iv bumex as ordered bid   -cont toprol 50 mg daily    #Aortic Insufficiency/Moderate MR  -JULIANO results as mentioned above  -Plan for redo AVR and Poss MVR on Monday vs ? TAVR. 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 Data within normal pacemaking function. Pt with known afib  -cont eliquis    #Hx dvt  -recent le doppler neg for dvt     #SP AVR   -stable on echo     #Sp PPM - Medtronic   -Recent ppm interrogation as mentioned above

## 2022-12-01 RX ADMIN — Medication 200 MILLIGRAM(S): at 13:02

## 2022-12-01 RX ADMIN — BUMETANIDE 1 MILLIGRAM(S): 0.25 INJECTION INTRAMUSCULAR; INTRAVENOUS at 05:57

## 2022-12-01 RX ADMIN — FINASTERIDE 5 MILLIGRAM(S): 5 TABLET, FILM COATED ORAL at 13:02

## 2022-12-01 RX ADMIN — Medication 1000 UNIT(S): at 13:02

## 2022-12-01 RX ADMIN — BUMETANIDE 1 MILLIGRAM(S): 0.25 INJECTION INTRAMUSCULAR; INTRAVENOUS at 13:03

## 2022-12-01 RX ADMIN — TAMSULOSIN HYDROCHLORIDE 0.4 MILLIGRAM(S): 0.4 CAPSULE ORAL at 17:08

## 2022-12-01 RX ADMIN — ENOXAPARIN SODIUM 100 MILLIGRAM(S): 100 INJECTION SUBCUTANEOUS at 09:27

## 2022-12-01 RX ADMIN — Medication 500 MILLIGRAM(S): at 13:02

## 2022-12-01 RX ADMIN — Medication 50 MILLIGRAM(S): at 05:56

## 2022-12-01 RX ADMIN — TAMSULOSIN HYDROCHLORIDE 0.4 MILLIGRAM(S): 0.4 CAPSULE ORAL at 05:56

## 2022-12-01 RX ADMIN — ENOXAPARIN SODIUM 100 MILLIGRAM(S): 100 INJECTION SUBCUTANEOUS at 21:09

## 2022-12-01 NOTE — CONSULT NOTE ADULT - PROBLEM SELECTOR RECOMMENDATION 9
he is been treated for ac heart failure as the reason for his acute onset sob:  he has severe AI and mod MR and is now scheduled for surgery on Monday  : he is on room air and feels OK: cont diuresis. deneis any underlying lung issue monitor per primary team

## 2022-12-01 NOTE — PROGRESS NOTE ADULT - SUBJECTIVE AND OBJECTIVE BOX
HPI:  Patient is a 68 yo Man with pmhx of HTN,Gout,HLD, Mild CAD, diastolic CHF, AAA s/p repair, DVT x 2 separate episodes (s/p Eliquis), s/p AVR (Bovine), s/p medtronic PPM,  BPH, hx of paroxysmal atrial fibrillation on xarelto, possible AV fistula in his Right groin who presents for shortness of breath  He was watching tv and he started feeling short of breath. This prompted him and his son to bring him to the ER. He states he has been taking medications as prescribed. He did not miss a dose. He had stopped aspirin, presumably due to mild CAD. He endorses occasional cough, and mild leg swelling. He was previously admitted on 22 for acute CHF c/b entero/rhino virus infection. He went to work on Friday after discharge. And the following Monday went to see his PCP.      (2022 09:53)      REVIEW OF SYSTEMS:    CONSTITUTIONAL: No weakness, fevers or chills  EYES/ENT: No visual changes;  No vertigo or throat pain   NECK: No pain or stiffness  RESPIRATORY: No cough, wheezing, hemoptysis; No shortness of breath  CARDIOVASCULAR: No chest pain or palpitations  GASTROINTESTINAL: No abdominal or epigastric pain. No nausea, vomiting, or hematemesis; No diarrhea or constipation. No melena or hematochezia.  GENITOURINARY: No dysuria, frequency or hematuria  NEUROLOGICAL: No numbness or weakness  SKIN: No itching, burning, rashes, or lesions   All other review of systems is negative unless indicated above.      Medications:   MEDICATIONS  (STANDING):  albuterol/ipratropium for Nebulization. 3 milliLiter(s) Nebulizer once  allopurinol 200 milliGRAM(s) Oral daily  ascorbic acid 500 milliGRAM(s) Oral daily  buMETAnide Injectable 1 milliGRAM(s) IV Push two times a day  cholecalciferol 1000 Unit(s) Oral daily  enoxaparin Injectable 100 milliGRAM(s) SubCutaneous every 12 hours  finasteride 5 milliGRAM(s) Oral daily  metoprolol succinate ER 50 milliGRAM(s) Oral daily  tamsulosin 0.4 milliGRAM(s) Oral two times a day    MEDICATIONS  (PRN):      Allergies    No Known Allergies    Intolerances        PAST MEDICAL & SURGICAL HISTORY:  Hypertension      History of BPH      Cardiac murmur      Malignant melanoma  left back      Cardiac pacemaker  2013      History of heart valve replacement  , , porcine      S/P abdominal aortic aneurysm repair            Social :    No smoking       No ETOH use            Vital Signs Last 24 Hrs  T(C): 36.3 (01 Dec 2022 20:40), Max: 36.7 (01 Dec 2022 11:42)  T(F): 97.3 (01 Dec 2022 20:40), Max: 98 (01 Dec 2022 11:42)  HR: 68 (01 Dec 2022 20:40) (68 - 77)  BP: 101/62 (01 Dec 2022 20:40) (100/64 - 103/64)  BP(mean): --  RR: 18 (01 Dec 2022 20:40) (18 - 18)  SpO2: 95% (01 Dec 2022 20:40) (92% - 95%)    Parameters below as of 01 Dec 2022 20:40  Patient On (Oxygen Delivery Method): room air      CAPILLARY BLOOD GLUCOSE          11-30 @ :  -   @ 07:00  --------------------------------------------------------  IN: 780 mL / OUT: 1950 mL / NET: -1170 mL     @ 07:  -   @ 22:15  --------------------------------------------------------  IN: 800 mL / OUT: 1850 mL / NET: -1050 mL        Physical Exam:    Daily     Daily Weight in k.2 (01 Dec 2022 04:36)  General:  Well appearing, NAD, not cachetic  HEENT:  Nonicteric, PERRLA  CV:  RRR, no murmur, no JVD  Lungs:  CTA B/L, no wheezes, rales, rhonchi  Abdomen:  Soft, non-tender, no distended, positive BS, no hepatosplenomegaly  Extremities:  2+ pulses, no c/c, no edema  Skin:  Warm and dry, no rashes  :  No crystal  Neuro:  AAOx3, non-focal, CN II-XII grossly intact  No Restraints    LABS:                        11.6   8.18  )-----------( 172      ( 2022 07:04 )             35.0         139  |  100  |  36<H>  ----------------------------<  113<H>  3.9   |  30  |  1.15    Ca    9.3      2022 07:04  Mg     2.2           PTT - ( 2022 07:04 )  PTT:50.2 sec            RADIOLOGY & ADDITIONAL TESTS:    ---------------------------------------------------------------------------  I personally reviewed: [  ]EKG   [  ]CXR    [  ] CT    [  ]Other  ---------------------------------------------------------------------------

## 2022-12-02 ENCOUNTER — APPOINTMENT (OUTPATIENT)
Dept: CV DIAGNOSITCS | Facility: HOSPITAL | Age: 67
End: 2022-12-02

## 2022-12-02 DIAGNOSIS — T82.857A STENOSIS OF OTHER CARDIAC PROSTHETIC DEVICES, IMPLANTS AND GRAFTS, INITIAL ENCOUNTER: ICD-10-CM

## 2022-12-02 DIAGNOSIS — T82.897A OTHER SPECIFIED COMPLICATION OF CARDIAC PROSTHETIC DEVICES, IMPLANTS AND GRAFTS, INITIAL ENCOUNTER: ICD-10-CM

## 2022-12-02 LAB — GLUCOSE BLDC GLUCOMTR-MCNC: 136 MG/DL — HIGH (ref 70–99)

## 2022-12-02 PROCEDURE — 71045 X-RAY EXAM CHEST 1 VIEW: CPT | Mod: 26

## 2022-12-02 RX ORDER — BUMETANIDE 0.25 MG/ML
1 INJECTION INTRAMUSCULAR; INTRAVENOUS ONCE
Refills: 0 | Status: DISCONTINUED | OUTPATIENT
Start: 2022-12-02 | End: 2022-12-02

## 2022-12-02 RX ORDER — BUMETANIDE 0.25 MG/ML
1 INJECTION INTRAMUSCULAR; INTRAVENOUS ONCE
Refills: 0 | Status: COMPLETED | OUTPATIENT
Start: 2022-12-02 | End: 2022-12-02

## 2022-12-02 RX ORDER — ONDANSETRON 8 MG/1
4 TABLET, FILM COATED ORAL ONCE
Refills: 0 | Status: DISCONTINUED | OUTPATIENT
Start: 2022-12-02 | End: 2022-12-05

## 2022-12-02 RX ADMIN — Medication 200 MILLIGRAM(S): at 10:44

## 2022-12-02 RX ADMIN — TAMSULOSIN HYDROCHLORIDE 0.4 MILLIGRAM(S): 0.4 CAPSULE ORAL at 18:51

## 2022-12-02 RX ADMIN — ENOXAPARIN SODIUM 100 MILLIGRAM(S): 100 INJECTION SUBCUTANEOUS at 10:43

## 2022-12-02 RX ADMIN — ENOXAPARIN SODIUM 100 MILLIGRAM(S): 100 INJECTION SUBCUTANEOUS at 20:01

## 2022-12-02 RX ADMIN — Medication 50 MILLIGRAM(S): at 05:23

## 2022-12-02 RX ADMIN — TAMSULOSIN HYDROCHLORIDE 0.4 MILLIGRAM(S): 0.4 CAPSULE ORAL at 05:23

## 2022-12-02 RX ADMIN — Medication 1000 UNIT(S): at 10:45

## 2022-12-02 RX ADMIN — Medication 500 MILLIGRAM(S): at 10:45

## 2022-12-02 RX ADMIN — BUMETANIDE 1 MILLIGRAM(S): 0.25 INJECTION INTRAMUSCULAR; INTRAVENOUS at 05:23

## 2022-12-02 RX ADMIN — BUMETANIDE 1 MILLIGRAM(S): 0.25 INJECTION INTRAMUSCULAR; INTRAVENOUS at 10:44

## 2022-12-02 RX ADMIN — FINASTERIDE 5 MILLIGRAM(S): 5 TABLET, FILM COATED ORAL at 10:45

## 2022-12-02 RX ADMIN — BUMETANIDE 1 MILLIGRAM(S): 0.25 INJECTION INTRAMUSCULAR; INTRAVENOUS at 20:01

## 2022-12-02 NOTE — PROGRESS NOTE ADULT - SUBJECTIVE AND OBJECTIVE BOX
Date of Service: 12-02-22 @ 12:22    Patient is a 67y old  Male who presents with a chief complaint of Acute heart failure (02 Dec 2022 10:10)      Any change in ROS: lastnight got sob:  started on 2 L of nasal cannula:      MEDICATIONS  (STANDING):  albuterol/ipratropium for Nebulization. 3 milliLiter(s) Nebulizer once  allopurinol 200 milliGRAM(s) Oral daily  ascorbic acid 500 milliGRAM(s) Oral daily  buMETAnide Injectable 1 milliGRAM(s) IV Push two times a day  cholecalciferol 1000 Unit(s) Oral daily  enoxaparin Injectable 100 milliGRAM(s) SubCutaneous every 12 hours  finasteride 5 milliGRAM(s) Oral daily  metoprolol succinate ER 50 milliGRAM(s) Oral daily  tamsulosin 0.4 milliGRAM(s) Oral two times a day    MEDICATIONS  (PRN):    Vital Signs Last 24 Hrs  T(C): 36.4 (02 Dec 2022 04:18), Max: 36.4 (02 Dec 2022 04:18)  T(F): 97.5 (02 Dec 2022 04:18), Max: 97.5 (02 Dec 2022 04:18)  HR: 62 (02 Dec 2022 04:18) (62 - 68)  BP: 104/54 (02 Dec 2022 04:18) (101/62 - 104/54)  BP(mean): --  RR: 18 (02 Dec 2022 04:18) (18 - 18)  SpO2: 98% (02 Dec 2022 04:18) (95% - 98%)    Parameters below as of 02 Dec 2022 04:18  Patient On (Oxygen Delivery Method): nasal cannula        I&O's Summary    01 Dec 2022 07:01  -  02 Dec 2022 07:00  --------------------------------------------------------  IN: 800 mL / OUT: 2250 mL / NET: -1450 mL          Physical Exam:   GENERAL: NAD, well-groomed, well-developed  HEENT: BRIA/   Atraumatic, Normocephalic  ENMT: No tonsillar erythema, exudates, or enlargement; Moist mucous membranes, Good dentition, No lesions  NECK: Supple, No JVD, Normal thyroid  CHEST/LUNG: Clear to auscultaion  CVS: Regular rate and rhythm; No murmurs, rubs, or gallops  GI: : Soft, Nontender, Nondistended; Bowel sounds present  NERVOUS SYSTEM:  Alert & Oriented X3  EXTREMITIES:  + edema  LYMPH: No lymphadenopathy noted  SKIN: No rashes or lesions  ENDOCRINOLOGY: No Thyromegaly  PSYCH: Appropriate    Labs:                              11.6   8.18  )-----------( 172      ( 30 Nov 2022 07:04 )             35.0                         11.4   8.04  )-----------( 178      ( 30 Nov 2022 00:18 )             34.3                         11.8   9.25  )-----------( 193      ( 29 Nov 2022 06:33 )             36.0                         11.8   7.89  )-----------( 197      ( 28 Nov 2022 18:32 )             35.8     11-30    139  |  100  |  36<H>  ----------------------------<  113<H>  3.9   |  30  |  1.15  11-29    138  |  98  |  39<H>  ----------------------------<  132<H>  3.9   |  27  |  1.14        CAPILLARY BLOOD GLUCOSE      POCT Blood Glucose.: 136 mg/dL (02 Dec 2022 05:04)                  RECENT CULTURES:        RESPIRATORY CULTURES:      rad< from: CT Heart without Coronaries w/ IV Cont (11.30.22 @ 12:58) >        Diameters: 7 x 10 (mm)        Tortuosity: Minimal        Calcification: None    IMPRESSION:  1.  Severely calcified (Agatston calcium score) trileaflet aortic valve.  2.  Aortic and peripheral access vessel measurements as reported above.  3. 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 This is best seen on series 21, the thin   section axial images [id number].    --- End of Report ---            BISHNU HIGHTOWER MD; Attending Radiologist  This document has been electronically signed. Dec  1 2022 11:21AM    < end of copied text >  < from: Transesophageal Echocardiogram w/o TTE (11.28.22 @ 11:50) >  Mild-moderate tricuspid regurgitation. Pulmonic valve not  well visualized.  Pericardium/Pleura: Normal pericardium with no pericardial  effusion.  Hemodynamic: Estimated right atrial pressure is 8 mm Hg.  Estimated right ventricular systolic pressure equals 53 mm  Hg, assuming right atrial pressure equals 8 mm Hg,  consistent with moderate pulmonary hypertension. Contrast  injection demonstrates no evidence of a patent foramen  ovale.  ------------------------------------------------------------------------  Conclusions:  1. Thickened mitral valve. Moderate mitral regurgitation.  Two jets are seen one lateral P1/P2 0.17 sqcm by 3D Vena  contracta area and one more  medial P3 which measures  0.12sqcm.  2. Bioprosthetic aortic valve. Peak transaortic valve  gradient equals 25 mm Hg, mean transaortic valve gradient  equals 16 mm Hg, which is probably normal in the presence  of a bioprosthetic aortic valve. Moderate-Severe eccentric  aortic regurgitation which appears paravalvular at 11-1pm  on short axis.  3. Mild atheroma noted in aortic arch/descending aorta.  There is echogenic material seen in the proximal ascending  aorta consistant with prior hemiarch replacement.  4.  No left atrium or left atrial appendage thrombus.  5. Normal left ventricular internal dimensions and wall  thicknesses.  6. Normal left ventricular systolic function.  7. Right ventricular enlargement with mildly decreased  right ventricular systolic function.  8. Normal tricuspid valve. Mild-moderate tricuspid  regurgitation.  9. Estimated pulmonary artery systolic pressure equals 53  mm Hg, assuming right atrial pressure equals 8 mm Hg,  consistent with moderate pulmonary pressures.  ------------------------------------------------------------------------  Confirmed on  11/28/2022 - 20:49:57by FRANK Martinez  ------------------------------------------------------------------------    < end of copied text >      Studies  Chest X-RAY  CT SCAN Chest   Venous Dopplers: LE:   CT Abdomen  Others

## 2022-12-02 NOTE — CONSULT NOTE ADULT - PROBLEM SELECTOR RECOMMENDATION 9
- echo reviewed with Dr. Shayla Stern and AI is found to be mod/severe and intravalvular  - will proceed with Shubham TAVR on Monday

## 2022-12-02 NOTE — CONSULT NOTE ADULT - PROBLEM SELECTOR PROBLEM 1
Paravalvular leak (prosthetic valve)
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
Acute on chronic diastolic congestive heart failure

## 2022-12-02 NOTE — CONSULT NOTE ADULT - SUBJECTIVE AND OBJECTIVE BOX
67M with pmhx of HTN,Gout,HLD, Mild CAD, diastolic CHF, AAA s/p repair, DVT x 2 separate episodes (s/p Eliquis), s/p AVR (Bovine), s/p medtronic PPM,  BPH, hx of paroxysmal atrial fibrillation on xarelto, and known, chronic CFA pseudoaneurysm-CFV AVF, with a chief complaint of SOB,   Three days earlier, He was watching tv and he started feeling short of breath and was brought to the ED. He was believed to be in acute decompensated HF, however is doing better since admission. he is being worked up for possible causes of HF. Of note, a JULIANO was performed earlier today. He was previously admitted on 11/8/22 for acute CHF in which vascular surgery was consulted for the AV fistula. A duplex was obtained confirming diagnosis, and patient was discharged with outpatient f/u. Pt currently scheduled for surgical repair this Monday 11/29. Vascular consulted for re-admssion      PAST MEDICAL & SURGICAL HISTORY:  Hypertension      History of BPH      Cardiac murmur      Malignant melanoma  left back      Cardiac pacemaker  05/2013      History of heart valve replacement  2005, 2013, porcine      S/P abdominal aortic aneurysm repair  2013          Home Medications:  finasteride 5 mg oral tablet: 1 tab(s) orally once a day (24 Nov 2022 10:35)  tamsulosin 0.4 mg oral capsule: 1 cap(s) orally 2 times a day (24 Nov 2022 10:35)  Vitamin C 1000 mg oral tablet: 1 tab(s) orally once a day (24 Nov 2022 10:35)  Vitamin D3 1000 intl units oral tablet: 1 tab(s) orally once a day (24 Nov 2022 10:35)          Vitals (Last 12 Hours)  T(F): 97.7 (11-28-22 @ 12:25), Max: 99.4 (11-28-22 @ 10:00)  HR:  (60 - 80)  BP:  (97/35 - 113/68)  RR:  (16 - 18)  SpO2:  (93% - 96%)        PHYSICAL EXAM:  General: NAD, resting comfortably  Pulmonary: patient currently Nonlabored breathing, no respiratory distress, supplemental O2 is off  Abdominal: soft, mildly distended  Extremities:  RLE: palpable thrill in R groin femoral vessels   palpable PT/DP  BL pitting edema        LABS:      11-28    139  |  100  |  38<H>  ----------------------------<  115<H>  3.8   |  29  |  1.17    Ca    9.3      28 Nov 2022 06:40              CAPILLARY BLOOD GLUCOSE        CAPILLARY BLOOD GLUCOSE            Microbiology:        RADIOLOGY & ADDITIONAL STUDIES:  
CARDIOLOGY CONSULT NOTE - DR. RIBEIRO    HPI:  Patient is a 68 yo Man with pmhx of HTN,Gout,HLD, Mild CAD, diastolic CHF, AAA s/p repair, DVT x 2 separate episodes (s/p Eliquis), s/p AVR (Bovine), s/p medtronic PPM,  BPH, hx of paroxysmal atrial fibrillation on xarelto, possible AV fistula in his Right groin who presents for shortness of breath  He was watching tv and he started feeling short of breath. This prompted him and his son to bring him to the ER. He states he has been taking medications as prescribed. He did not miss a dose. He had stopped aspirin, presumably due to mild CAD. He endorses occasional cough, and mild leg swelling. He was previously admitted on 11/8/22 for acute CHF c/b entero/rhino virus infection. He went to work on Friday after discharge. And the following Monday went to see his PCP.     feels better  no dyspnea  no chest pain            PAST MEDICAL & SURGICAL HISTORY:  Hypertension      History of BPH      Cardiac murmur      Malignant melanoma  left back      Cardiac pacemaker  05/2013      History of heart valve replacement  2005, 2013, porcine      S/P abdominal aortic aneurysm repair  2013            PREVIOUS DIAGNOSTIC TESTING:    [ ] Echocardiogram:  [ ]  Catheterization:  [ ] Stress Test:  	    MEDICATIONS:    Home Medications:  finasteride 5 mg oral tablet: 1 tab(s) orally once a day (24 Nov 2022 10:35)  tamsulosin 0.4 mg oral capsule: 1 cap(s) orally 2 times a day (24 Nov 2022 10:35)  Vitamin C 1000 mg oral tablet: 1 tab(s) orally once a day (24 Nov 2022 10:35)  Vitamin D3 1000 intl units oral tablet: 1 tab(s) orally once a day (24 Nov 2022 10:35)      MEDICATIONS  (STANDING):  allopurinol 200 milliGRAM(s) Oral daily  ascorbic acid 500 milliGRAM(s) Oral daily  cholecalciferol 1000 Unit(s) Oral daily  finasteride 5 milliGRAM(s) Oral daily  metoprolol succinate ER 50 milliGRAM(s) Oral daily  rivaroxaban 15 milliGRAM(s) Oral with dinner  tamsulosin 0.4 milliGRAM(s) Oral two times a day      FAMILY HISTORY:  FH: lung cancer (Mother)    FH: colon cancer (Father)        SOCIAL HISTORY:    [x ] Non-smoker  [ ] Smoker  [ ] Alcohol    Allergies    No Known Allergies    Intolerances    	    REVIEW OF SYSTEMS:  CONSTITUTIONAL: No fever, weight loss, or fatigue  EYES: No eye pain, visual disturbances, or discharge  ENMT:  No difficulty hearing, tinnitus, vertigo; No sinus or throat pain  NECK: No pain or stiffness  RESPIRATORY: No cough, wheezing, chills or hemoptysis; + Shortness of Breath  CARDIOVASCULAR: as HPI  GASTROINTESTINAL: No abdominal or epigastric pain. No nausea, vomiting, or hematemesis; No diarrhea or constipation. No melena or hematochezia.  GENITOURINARY: No dysuria, frequency, hematuria, or incontinence  NEUROLOGICAL: No headaches, memory loss, loss of strength, numbness, or tremors  SKIN: No itching, burning, rashes, or lesions   	  [ ] All others negative	  [ ] Unable to obtain    PHYSICAL EXAM:    T(C): 36.3 (11-24-22 @ 09:19), Max: 36.6 (11-24-22 @ 08:00)  HR: 62 (11-24-22 @ 12:00) (60 - 69)  BP: 116/66 (11-24-22 @ 12:00) (100/45 - 116/66)  RR: 18 (11-24-22 @ 12:00) (17 - 19)  SpO2: 98% (11-24-22 @ 12:00) (97% - 100%)  Wt(kg): --  I&O's Summary    Daily Height in cm: 182.88 (24 Nov 2022 02:07)    Daily     Appearance: Normal	  Psychiatry: A & O x 3, Mood & affect appropriate  HEENT:   Normal oral mucosa, PERRL, EOMI	  Lymphatic: No lymphadenopathy  Cardiovascular: Normal S1 S2,RRR, sm  Respiratory: crackles b/l bases   Gastrointestinal:  Soft, Non-tender, + BS	  Skin: No rashes, No ecchymoses, No cyanosis	  Neurologic: Non-focal  Extremities: Normal range of motion, min b/l edema   Vascular: Peripheral pulses palpable 2+ bilaterally    TELEMETRY: 	    ECG:  	paced no acute ischemic abnl   RADIOLOGY:  OTHER: 	  	  LABS:	 	    CARDIAC MARKERS:        proBNP: Serum Pro-Brain Natriuretic Peptide: 7668 pg/mL (11-24 @ 04:05)      Lipid Profile:   HgA1c:   TSH:                           12.7   12.15 )-----------( 237      ( 24 Nov 2022 04:05 )             38.5     11-24    141  |  103  |  39<H>  ----------------------------<  173<H>  4.8   |  26  |  1.87<H>    Ca    9.9      24 Nov 2022 04:05  Mg     2.2     11-24    TPro  7.4  /  Alb  3.9  /  TBili  1.1  /  DBili  x   /  AST  146<H>  /  ALT  132<H>  /  AlkPhos  118  11-24    PT/INR - ( 24 Nov 2022 04:05 )   PT: 22.0 sec;   INR: 1.88 ratio         PTT - ( 24 Nov 2022 04:05 )  PTT:34.2 sec    Creatinine, Serum: 1.87 mg/dL (11-24-22 @ 04:05)        ASSESSMENT/PLAN: 	              
Structural Heart Team    HPI:  Patient is a 68 yo Man with pmhx of HTN,Gout,HLD, Mild CAD, diastolic CHF, AAA s/p repair, DVT x 2 separate episodes (s/p Eliquis), s/p AVR (Bovine), s/p medtronic PPM,  BPH, hx of paroxysmal atrial fibrillation on xarelto, possible AV fistula in his Right groin who presents for shortness of breath  He was watching tv and he started feeling short of breath. This prompted him and his son to bring him to the ER. He states he has been taking medications as prescribed. He did not miss a dose. He had stopped aspirin, presumably due to mild CAD. He endorses occasional cough, and mild leg swelling. He was previously admitted on 11/8/22 for acute CHF c/b entero/rhino virus infection. He went to work on Friday after discharge. And the following Monday went to see his PCP.       Allergies    No Known Allergies    Intolerances      PAST MEDICAL & SURGICAL HISTORY:  Hypertension    History of BPH    Cardiac murmur    Malignant melanoma  left back    Cardiac pacemaker  05/2013    History of heart valve replacement  2005, 2013, porcine    S/P abdominal aortic aneurysm repair  2013      MEDICATIONS  (STANDING):  albuterol/ipratropium for Nebulization. 3 milliLiter(s) Nebulizer once  allopurinol 200 milliGRAM(s) Oral daily  ascorbic acid 500 milliGRAM(s) Oral daily  buMETAnide Injectable 1 milliGRAM(s) IV Push two times a day  cholecalciferol 1000 Unit(s) Oral daily  enoxaparin Injectable 100 milliGRAM(s) SubCutaneous every 12 hours  finasteride 5 milliGRAM(s) Oral daily  metoprolol succinate ER 50 milliGRAM(s) Oral daily  tamsulosin 0.4 milliGRAM(s) Oral two times a day      REVIEW OF SYSTEMS:    CONSTITUTIONAL: No weakness, fevers or chills  EYES/ENT: No visual changes;  No vertigo or throat pain   NECK: No pain or stiffness  RESPIRATORY: No cough, wheezing, hemoptysis; No shortness of breath  CARDIOVASCULAR: No chest pain or palpitations  GASTROINTESTINAL: No abdominal or epigastric pain. No nausea, vomiting, or hematemesis; No diarrhea or constipation. No melena or hematochezia.  GENITOURINARY: No dysuria, frequency or hematuria  NEUROLOGICAL: No numbness or weakness  SKIN: No itching, rashes      Vital Signs Last 24 Hrs  T(C): 36.3 (02 Dec 2022 12:25), Max: 36.4 (02 Dec 2022 04:18)  T(F): 97.4 (02 Dec 2022 12:25), Max: 97.5 (02 Dec 2022 04:18)  HR: 68 (02 Dec 2022 12:25) (62 - 68)  BP: 102/64 (02 Dec 2022 12:25) (101/62 - 104/54)  BP(mean): --  RR: 18 (02 Dec 2022 12:25) (18 - 18)  SpO2: 96% (02 Dec 2022 12:25) (95% - 98%)    Parameters below as of 02 Dec 2022 12:25  Patient On (Oxygen Delivery Method): nasal cannula                  I&O's Summary    01 Dec 2022 07:01  -  02 Dec 2022 07:00  --------------------------------------------------------  IN: 800 mL / OUT: 2250 mL / NET: -1450 mL    02 Dec 2022 07:01  -  02 Dec 2022 16:37  --------------------------------------------------------  IN: 118 mL / OUT: 500 mL / NET: -382 mL          Physical Exam  General: NAD  Cardiac: s1s2, RRR, diastolic murmur  Pulmonary: CTA b/l, nonfocal  Gastrointestinal: soft abdomen, nontender, nondistended, + bowel sounds throughout  Extremities: no edema, 2+ DP pulses b/l  Neuro: A&Ox3, nonfocal  EKG: Vpaced      < from: Transesophageal Echocardiogram w/o TTE (11.28.22 @ 11:50) >  LA:            2.0 - 4.0 cm  Ao:            2.0 - 3.8 cm  SEPTUM:        0.6 - 1.2 cm  PWT:           0.6 - 1.1 cm  LVIDd:         3.0 - 5.6 cm  LVIDs:         1.8 - 4.0 cm  EF (Visual Estimate): 65 %  EF (Urban Rule): 68 %Doppler Peak Velocity (m/sec):  AoV=2.5  ------------------------------------------------------------------------  Observations:  Mitral Valve: Thickened mitral valve. Moderate mitral  regurgitation. Two jets are seen one lateral P1/P2 0.17  sqcm by 3D Vena contracta area and one more  medial P3  which measures 0.12sqcm.  Aortic Valve/Aorta: Bioprosthetic aortic valve. Peak  transaortic valve gradient equals 25 mm Hg, mean  transaortic valve gradient equals 16 mm Hg, which is  probably normal in the presence of a bioprosthetic aortic  valve. Moderate-Severe eccentric aortic regurgitation which  appears paravalvular at 11-1pm on short axis.  Mild atheroma noted in aortic arch/descending aorta. There  is echogenic material seen in the proximal ascending aorta  consistant with prior hemiarch replacement.  Left Atrium:  No left atrium or left atrial appendage  thrombus.  Left Ventricle: Normal left ventricular systolic function.  Normal left ventricular internal dimensions and wall  thicknesses. Unable to evaluate diastology.  Right Heart: A device wire is noted in the right heart.  Right ventricular enlargement with mildly decreased right  ventricular systolic function. Normal tricuspid valve.  Mild-moderate tricuspid regurgitation. Pulmonic valve not  well visualized.  Pericardium/Pleura: Normal pericardium with no pericardial  effusion.  Hemodynamic: Estimated right atrial pressure is 8 mm Hg.  Estimated right ventricular systolic pressure equals 53 mm  Hg, assuming right atrial pressure equals 8 mm Hg,  consistent with moderate pulmonary hypertension. Contrast  injection demonstrates no evidence of a patent foramen  ovale.  ------------------------------------------------------------------------  Conclusions:  1. Thickened mitral valve. Moderate mitral regurgitation.  Two jets are seen one lateral P1/P2 0.17 sqcm by 3D Vena  contracta area and one more  medial P3 which measures  0.12sqcm.  2. Bioprosthetic aortic valve. Peak transaortic valve  gradient equals 25 mm Hg, mean transaortic valve gradient  equals 16 mm Hg, which is probably normal in the presence  of a bioprosthetic aortic valve. Moderate-Severe eccentric  aortic regurgitation which appears paravalvular at 11-1pm  on short axis.  3. Mild atheroma noted in aortic arch/descending aorta.  There is echogenic material seen in the proximal ascending  aorta consistant with prior hemiarch replacement.  4.  No left atrium or left atrial appendage thrombus.  5. Normal left ventricular internal dimensions and wall  thicknesses.  6. Normal left ventricular systolic function.  7. Right ventricular enlargement with mildly decreased  right ventricular systolic function.  8. Normal tricuspid valve. Mild-moderate tricuspid  regurgitation.  9. Estimated pulmonary artery systolic pressure equals 53  mm Hg, assuming right atrial pressure equals 8 mm Hg,  consistent with moderate pulmonary pressures.    < end of copied text >        
History of Present Illness:  68 yo Man with pmhx of HTN, Gout, HLD, Mild CAD, diastolic CHF, AAA s/p repair, DVT x 2 separate episodes (s/p Eliquis), s/p AVR (Bovine), s/p Medtronic PPM,  BPH, hx of paroxysmal atrial fibrillation on Xarelto possible AV fistula in his Right groin who presents for shortness of breath  He was watching tv and he started feeling short of breath. This prompted him and his son to bring him to the ER. He states he has been taking medications as prescribed. He did not miss a dose. He had stopped aspirin, presumably due to mild CAD. He endorses occasional cough, and mild leg swelling. He was previously admitted on 11/8/22 for acute CHF c/b entero/rhino virus infection. He went to work on Friday after discharge. And the following Monday went to see his PCP    Past Medical History  Hypertension    History of BPH    Cardiac murmur    Malignant melanoma  left back      Past Surgical History  History of cardiac murmur    Cardiac pacemaker  05/2013    History of heart valve replacement  2005, 2013, porcine    S/P abdominal aortic aneurysm repair  2013        MEDICATIONS  (STANDING):  albuterol/ipratropium for Nebulization. 3 milliLiter(s) Nebulizer once  allopurinol 200 milliGRAM(s) Oral daily  ascorbic acid 500 milliGRAM(s) Oral daily  buMETAnide Injectable 1 milliGRAM(s) IV Push two times a day  cholecalciferol 1000 Unit(s) Oral daily  finasteride 5 milliGRAM(s) Oral daily  metoprolol succinate ER 50 milliGRAM(s) Oral daily  rivaroxaban 15 milliGRAM(s) Oral with dinner  tamsulosin 0.4 milliGRAM(s) Oral two times a day    MEDICATIONS  (PRN):    Antiplatelet therapy:                           Last dose/amt:    Allergies: No Known Allergies      SOCIAL HISTORY:  Smoker: [ ] Yes  [ X] No        PACK YEARS:                         WHEN QUIT?  ETOH use: [ ] Yes  [X ] No              FREQUENCY / QUANTITY:  Ilicit Drug use:  [ ] Yes  [X ] No  Occupation:  Live with: Alone with son nearby  Assist device use: none     Relevant Family History  FAMILY HISTORY:  FH: lung cancer (Mother)    FH: colon cancer (Father)        Review of Systems  GENERAL:  Fevers[] chills[] sweats[] fatigue[] weight loss[] weight gain [X]                                        NEURO:  parathesias[] seizures []  syncope []  confusion []                                                                                  EYES: glasses[]  blurry vision[]  discharge[] pain[] glaucoma []                                                                            ENMT:  difficulty hearing []  vertigo[]  dysphagia[] epistaxis[] recent dental work []                                      CV:  chest pain[] palpitations[X] BRAND [] diaphoresis [] edema[X]                                                                                             RESPIRATORY:  wheezing[] SOB[X] cough [] sputum[] hemoptysis[]                                                                    GI:  nausea[]  vomiting []  diarrhea[] constipation [] melena []                                                                        : hematuria[ ]  dysuria[ ] urgency[] incontinence[]                                                                                              MUSKULOSKELETAL:  arthritis[ ]  joint swelling [ ] muscle weakness [ ]                                                                  SKIN/BREAST:  rash[ ] itching [ ]  hair loss[ ] masses[ ]                                                                                                PSYCH:  dementia [ ] depresion [ ] anxiety[ ]                                                                                                                  HEME/LYMPH:  bruises easily[ ] enlarged lymph nodes[ ] tender lymph nodes[ ]                                                 ENDOCRINE:  cold intolerance[ ] heat intolerance[ ] polydipsia[ ]                                                                              PHYSICAL EXAM  Vital Signs Last 24 Hrs  T(C): 36.4 (29 Nov 2022 12:17), Max: 36.5 (29 Nov 2022 04:22)  T(F): 97.5 (29 Nov 2022 12:17), Max: 97.7 (29 Nov 2022 04:22)  HR: 71 (29 Nov 2022 12:17) (67 - 78)  BP: 99/61 (29 Nov 2022 12:17) (99/61 - 110/64)  BP(mean): --  RR: 18 (29 Nov 2022 12:17) (18 - 18)  SpO2: 94% (29 Nov 2022 12:17) (94% - 97%)    Parameters below as of 29 Nov 2022 12:17  Patient On (Oxygen Delivery Method): room air        General: Well nourished, well developed, no acute distress.                                                         Neuro: Normal exam oriented to person/place & time with no focal motor or sensory  deficits.                    Eyes: Normal exam of conjunctiva & lids, pupils equally reactive.   ENT: Normal exam of nasal/oral mucosa with absence of cyanosis.   Neck: Normal exam of jugular veins, trachea & thyroid.   Chest: Normal lung exam with good air movement absence of wheezes, rales, or rhonchi:                                                                          CV:  Auscultation: normal [ ] S3[ ] S4[ ] Irregular [ ] Rub[ ] Clicks[ ]  Murmurs none:[ ]systolic [ ]  diastolic [X ] holosystolic [ ]  Carotids: No Bruits[ ] Other____________ Abdominal Aorta: normal [ ] nonpalpable[ ]                                                                         GI: Normal exam of abdomen, liver & spleen with no noted masses or tenderness.                                                                                              Extremities: Normal no evidence of cyanosis or deformity Edema: none[ ]trace[ X]1+[ ]2+[ ]3+[ ]4+[ ]  Lower Extremity Pulses: Right[ ] Left[ ]Varicosities[ ]  SKIN : Normal exam to inspection & palpation.                                                           LABS:                        11.8   9.25  )-----------( 193      ( 29 Nov 2022 06:33 )             36.0     11-29    138  |  98  |  39<H>  ----------------------------<  132<H>  3.9   |  27  |  1.14    Ca    9.5      29 Nov 2022 06:33      < from: Transesophageal Echocardiogram w/o TTE (11.28.22 @ 11:50) >  PROCEDURE: Transesophageal (JULIANO) was performed.  Informed  consent was first obtained for JULIANO. The patient wassedated  - see anesthesia record.  The procedure was monitored with  automatic blood pressure monitoring, ECG tracings and pulse  oximetry. The transesophageal probe was placed in the  esophagus posterior to the heart without complications.  Real-time and reconstructed 3-dimensional imaging was  performed with Workstation. Color Doppler analysis was  carried out using both 2D and 3D mapping. Patient was  injected with 10 cc's of aerosolized saline. Patient was  injected with 10 cc's of aerosolized saline.  INDICATION: Presence of prosthetic heart valve (Z95.2),  Other specified complication of cardiac prosthetic devices,  implants and grafts, initial encounter (U20.930C)  ------------------------------------------------------------------------  Dimensions:    Normal Values:  LA:            2.0 - 4.0 cm  Ao:            2.0 - 3.8 cm  SEPTUM:        0.6 - 1.2 cm  PWT:           0.6 - 1.1 cm  LVIDd:         3.0 - 5.6 cm  LVIDs:         1.8 - 4.0 cm  EF (Visual Estimate): 65 %  EF (Urban Rule): 68 %Doppler Peak Velocity (m/sec):  AoV=2.5  ------------------------------------------------------------------------  Observations:  Mitral Valve: Thickened mitral valve. Moderate mitral  regurgitation. Two jets are seen one lateral P1/P2 0.17  sqcm by 3D Vena contracta area and one more  medial P3  which measures 0.12sqcm.  Aortic Valve/Aorta: Bioprosthetic aortic valve. Peak  transaortic valve gradient equals 25 mm Hg, mean  transaortic valve gradient equals 16 mm Hg, which is  probably normal in the presence of a bioprosthetic aortic  valve. Moderate-Severe eccentric aortic regurgitation which  appears paravalvular at 11-1pm on short axis.  Mild atheroma noted in aortic arch/descending aorta. There  is echogenic material seen in the proximal ascending aorta  consistant with prior hemiarch replacement.  Left Atrium:  No left atrium or left atrial appendage  thrombus.  Left Ventricle: Normal left ventricular systolic function.  Normal left ventricular internal dimensions and wall  thicknesses. Unable to evaluate diastology.  Right Heart: A device wire is noted in the right heart.  Right ventricular enlargement with mildly decreased right  ventricular systolic function. Normal tricuspid valve.  Mild-moderate tricuspid regurgitation. Pulmonic valve not  well visualized.  Pericardium/Pleura: Normal pericardium with no pericardial  effusion.  Hemodynamic: Estimated right atrial pressure is 8 mm Hg.  Estimated right ventricular systolic pressure equals 53 mm  Hg, assuming right atrial pressure equals 8 mm Hg,  consistent with moderate pulmonary hypertension. Contrast  injection demonstrates no evidence of a patent foramen  ovale.  ------------------------------------------------------------------------  Conclusions:  1. Thickened mitral valve. Moderate mitral regurgitation.  Two jets are seen one lateral P1/P2 0.17 sqcm by 3D Vena  contracta area and one more  medial P3 which measures  0.12sqcm.  2. Bioprosthetic aortic valve. Peak transaortic valve  gradient equals 25 mm Hg, mean transaortic valve gradient  equals 16 mm Hg, which is probably normal in the presence  of a bioprosthetic aortic valve. Moderate-Severe eccentric  aortic regurgitation which appears paravalvular at 11-1pm  on short axis.  3. Mild atheroma noted in aortic arch/descending aorta.  There is echogenic material seen in the proximal ascending  aorta consistant with prior hemiarch replacement.  4.  No left atrium or left atrial appendage thrombus.  5. Normal left ventricular internal dimensions and wall  thicknesses.  6. Normal left ventricular systolic function.  7. Right ventricular enlargement with mildly decreased  right ventricular systolic function.  8. Normal tricuspid valve. Mild-moderate tricuspid  regurgitation.  9. Estimated pulmonary artery systolic pressure equals 53  mm Hg, assuming right atrial pressure equals 8 mm Hg,  consistent with moderate pulmonary pressures.  ------------------------------------------------------------------------  Confirmed on  11/28/2022 - 20:49:57by FRANK Martinez    < end of copied text >        
  12-01-22 @ 12:03    Patient is a 67y old  Male who presents with a chief complaint of Acute heart failure (01 Dec 2022 10:40)      HPI:  Patient is a 68 yo Man with pmhx of HTN,Gout,HLD, Mild CAD, diastolic CHF, AAA s/p repair, DVT x 2 separate episodes (s/p Eliquis), s/p AVR (Bovine), s/p medtronic PPM,  BPH, hx of paroxysmal atrial fibrillation on xarelto, possible AV fistula in his Right groin who presents for shortness of breath  He was watching tv and he started feeling short of breath. This prompted him and his son to bring him to the ER. He states he has been taking medications as prescribed. He did not miss a dose. He had stopped aspirin, presumably due to mild CAD. He endorses occasional cough, and mild leg swelling. He was previously admitted on 11/8/22 for acute CHF c/b entero/rhino virus infection. He went to work on Friday after discharge. And the following Monday went to see his PCP.      (24 Nov 2022 09:53)      he was recently dc from hospital and at that time he was hypoxic without any obvious reason :  now he came  back with acute onset of sob while watching tv:       ?FOLLOWING PRESENT  [ x] Hx of PE/DVT, [ x] Hx COPD, [ x] Hx of Asthma, [ y] Hx of Hospitalization, [ ]x  Hx of BiPAP/CPAP use, [x ] Hx of CHARLI    Allergies    No Known Allergies    Intolerances        PAST MEDICAL & SURGICAL HISTORY:  Hypertension      History of BPH      Cardiac murmur      Malignant melanoma  left back      Cardiac pacemaker  05/2013      History of heart valve replacement  2005, 2013, porcine      S/P abdominal aortic aneurysm repair  2013          FAMILY HISTORY:  FH: lung cancer (Mother)    FH: colon cancer (Father)        Social History: [ x ] TOBACCO                  [ x ] ETOH                                 [  x] IVDA/DRUGS    REVIEW OF SYSTEMS      General:	x    Skin/Breast:x  	  Ophthalmologic:x  	  ENMT:	x    Respiratory and Thorax: sob,  	  Cardiovascular:	x    Gastrointestinal:	x    Genitourinary:	x    Musculoskeletal:	x    Neurological:	x    Psychiatric:	x    Hematology/Lymphatics:	x    Endocrine:	x    Allergic/Immunologic:	x    MEDICATIONS  (STANDING):  albuterol/ipratropium for Nebulization. 3 milliLiter(s) Nebulizer once  allopurinol 200 milliGRAM(s) Oral daily  ascorbic acid 500 milliGRAM(s) Oral daily  buMETAnide Injectable 1 milliGRAM(s) IV Push two times a day  cholecalciferol 1000 Unit(s) Oral daily  enoxaparin Injectable 100 milliGRAM(s) SubCutaneous every 12 hours  finasteride 5 milliGRAM(s) Oral daily  metoprolol succinate ER 50 milliGRAM(s) Oral daily  tamsulosin 0.4 milliGRAM(s) Oral two times a day    MEDICATIONS  (PRN):       Vital Signs Last 24 Hrs  T(C): 36.7 (01 Dec 2022 11:42), Max: 36.7 (30 Nov 2022 12:24)  T(F): 98 (01 Dec 2022 11:42), Max: 98 (30 Nov 2022 12:24)  HR: 77 (01 Dec 2022 11:42) (74 - 80)  BP: 103/64 (01 Dec 2022 11:42) (98/61 - 104/62)  BP(mean): --  RR: 18 (01 Dec 2022 11:42) (18 - 18)  SpO2: 95% (01 Dec 2022 11:42) (92% - 95%)    Parameters below as of 01 Dec 2022 11:42  Patient On (Oxygen Delivery Method): room air    Orthostatic VS          I&O's Summary    30 Nov 2022 07:01  -  01 Dec 2022 07:00  --------------------------------------------------------  IN: 780 mL / OUT: 1950 mL / NET: -1170 mL        Physical Exam:   GENERAL: NAD, well-groomed, well-developed  HEENT: BRIA/   Atraumatic, Normocephalic  ENMT: No tonsillar erythema, exudates, or enlargement; Moist mucous membranes, Good dentition, No lesions  NECK: Supple, No JVD, Normal thyroid  CHEST/LUNG: Clear to auscultation bilaterally- no wheezing  CVS: Regular rate and rhythm; No murmurs, rubs, or gallops  GI: : Soft, Nontender, Nondistended; Bowel sounds present  NERVOUS SYSTEM:  Alert & Oriented X3  EXTREMITIES: - edema  LYMPH: No lymphadenopathy noted  SKIN: No rashes or lesions  ENDOCRINOLOGY: No Thyromegaly  PSYCH: Appropriate    Labs:  COVID-19 PCR: NotDetec (27 Nov 2022 07:24)  SARS-CoV-2: NotDetec (24 Nov 2022 04:05)  SARS-CoV-2: NotDetec (16 Nov 2022 07:21)  COVID-19 PCR: NotDetec (13 Nov 2022 16:34)                              11.6   8.18  )-----------( 172      ( 30 Nov 2022 07:04 )             35.0                         11.4   8.04  )-----------( 178      ( 30 Nov 2022 00:18 )             34.3                         11.8   9.25  )-----------( 193      ( 29 Nov 2022 06:33 )             36.0                         11.8   7.89  )-----------( 197      ( 28 Nov 2022 18:32 )             35.8     11-30    139  |  100  |  36<H>  ----------------------------<  113<H>  3.9   |  30  |  1.15  11-29    138  |  98  |  39<H>  ----------------------------<  132<H>  3.9   |  27  |  1.14  11-28    139  |  100  |  38<H>  ----------------------------<  115<H>  3.8   |  29  |  1.17    Ca    9.3      30 Nov 2022 07:04  Mg     2.2     11-30      CAPILLARY BLOOD GLUCOSE    rad< from: CT Heart without Coronaries w/ IV Cont (11.30.22 @ 12:58) >        Diameters: 7 x 10 (mm)        Tortuosity: Minimal        Calcification: None    IMPRESSION:  1.  Severely calcified (Agatston calcium score) trileaflet aortic valve.  2.  Aortic and peripheral access vessel measurements as reported above.  3. 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 This is best seen on series 21, the thin   section axial images [id number].    --- End of Report ---            BISHNU HIGHTOWER MD; Attending Radiologist  This document has been electronically signed. Dec  1 2022 11:21AM    < end of copied text >  < from: CT Angio Lower Extremity w/ IV Cont, Right (11.29.22 @ 18:38) >    There is a fistulous connection between the pseudoaneurysmand the   central great saphenous vein (series 6 image 122). Aneurysmal dilation of   the central great saphenous vein, measuring up to 4.2 cm in diameter.    ADDITIONAL FINDINGS: The prostate is enlarged. Subcutaneous edema of the   right lower extremity, most prominently in the calf. Osseous degenerative   changes.      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    --- End of Report ---      < end of copied text >  < from: Xray Chest 2 Views PA/Lat (11.27.22 @ 10:36) >  INTERPRETATION:  HISTORY: Shortness of breath    TECHNIQUE: PA and lateral views of the chest were obtained.    COMPARISON: 11/24/2022    FINDINGS: The cardiac silhouette is normal in size. The patient is status   post median sternotomy and valve repair. There is a left-sided dual-lead   pacemaker. There is linear scarring versus atelectasis at the left lung   base. Otherwise, the lungs are clear.    IMPRESSION: Linear scarring versus atelectasis at the left lung base.   Otherwise, clear lungs.    --- End of Report ---            RAMBO HERRERA MD; Attending Radiologist  This document has been electronically signed. Nov 27 2022  4:50PM    < end of copied text >        PTT - ( 30 Nov 2022 07:04 )  PTT:50.2 sec    D DImer      Studies  Chest X-RAY  CT SCAN Chest   CT Abdomen  Venous Dopplers: LE:   Others      < from: Transesophageal Echocardiogram w/o TTE (11.28.22 @ 11:50) >  Mild-moderate tricuspid regurgitation. Pulmonic valve not  well visualized.  Pericardium/Pleura: Normal pericardium with no pericardial  effusion.  Hemodynamic: Estimated right atrial pressure is 8 mm Hg.  Estimated right ventricular systolic pressure equals 53 mm  Hg, assuming right atrial pressure equals 8 mm Hg,  consistent with moderate pulmonary hypertension. Contrast  injection demonstrates no evidence of a patent foramen  ovale.  ------------------------------------------------------------------------  Conclusions:  1. Thickened mitral valve. Moderate mitral regurgitation.  Two jets are seen one lateral P1/P2 0.17 sqcm by 3D Vena  contracta area and one more  medial P3 which measures  0.12sqcm.  2. Bioprosthetic aortic valve. Peak transaortic valve  gradient equals 25 mm Hg, mean transaortic valve gradient  equals 16 mm Hg, which is probably normal in the presence  of a bioprosthetic aortic valve. Moderate-Severe eccentric  aortic regurgitation which appears paravalvular at 11-1pm  on short axis.  3. Mild atheroma noted in aortic arch/descending aorta.  There is echogenic material seen in the proximal ascending  aorta consistant with prior hemiarch replacement.  4.  No left atrium or left atrial appendage thrombus.  5. Normal left ventricular internal dimensions and wall  thicknesses.  6. Normal left ventricular systolic function.  7. Right ventricular enlargement with mildly decreased  right ventricular systolic function.  8. Normal tricuspid valve. Mild-moderate tricuspid  regurgitation.  9. Estimated pulmonary artery systolic pressure equals 53  mm Hg, assuming right atrial pressure equals 8 mm Hg,  consistent with moderate pulmonary pressures.  ------------------------------------------------------------------------  Confirmed on  11/28/2022 - 20:49:57by FRANK Martinez    < end of copied text >  < from: TTE with Doppler (w/Cont) (11.09.22 @ 07:25) >  Hemodynamic: Estimated right atrial pressure is 8 mm Hg.  Estimated right ventricular systolic pressure equals 57 mm  Hg, assuming right atrial pressure equals 8 mm Hg,  consistent with moderate pulmonary hypertension.  ------------------------------------------------------------------------  Conclusions:  1. Bioprosthetic aortic valve. Peak transaortic valve  gradient equals 25 mm Hg, mean transaortic valve gradient  equals 15 mm Hg, which is probably normal in the presence  of a bioprosthetic aortic valve.  2. Normal left ventricular internal dimensions and wall  thicknesses.  3. Normal left ventricular systolic function.  4. Right ventricular enlargement with normal right  ventricular systolic function.  5. Normal tricuspid valve. Mild tricuspid regurgitation.  6. Estimated pulmonary artery systolic pressure equals 57  mm Hg, assuming right atrial pressure equals 8 mm Hg,  consistent with moderate pulmonary pressures.  *** No previous Echo exam.  ------------------------------------------------------------------------  Confirmed on  11/9/2022 - 09:56:24 by CARLEEN Escamilla  ------------------------------------------------------------------------    < end of copied text >

## 2022-12-02 NOTE — CONSULT NOTE ADULT - ASSESSMENT
68 y/o male with hx of ascending aortic aneurysm repair, DVT ( two sepearte occasions ) was on Eliquis ( last used 6 months ago ) , sp  AVR ( bovine ) ( repaired twice ?),  s/p PPM (medtronic), HFpEF, AVF of right femoral artery, HTN, BPH, admitted with acute onset dyspnea.   JULIANO done showing Mod MR and Mod/Sev AV PVL.     11/29 CT surgery consulted. Preop for Redo AVR and Poss MVR on Monday
ECHO 11/9/22:  Ef 73%: mild MS, fx bioprosthe avr, nl LV sys fx   mod pulm pressure   Stress test 11/9/22: abnl with evidence of infarct and shilo-infarct ischemia, LVEF 59%;   revealing hypokinesis of the basal inferior and basal septal walls and reduced systolic thickening of the basal to mid inferolateral, distal anterior, distal anteroseptal, and apical walls.   Nuclear Stress Test-Pharmacologic (06.03.15 @ 12:15) >  ------------------------------------------------------------------------  IMPRESSIONS:Normal Study  * Baseline ECG: Nonspecific ST-T wave abnormality.  * ECG Changes: No ischemic ST segment changes.  * Arrhythmia: None.  * Review of raw data shows: Minor motion artifact.  * The left ventricle was enlarged. There is a small, mild  defect in the apex that is mostly fixed and demonstrates  preserved wall motion suggestive of attenuation artifact.  * Post-stress gated wall motion analysis was performed  (LVEF = 62 %;LVEDV = 136 ml.) revealing paradoxical septal  motion consistent with a post-operative state.  ------------------------------------------------------------------------    a/p     66 y/o male with hx of ascending aortic aneurysm repair, DVT ( two sepearte occasions ) was on Eliquis ( last used 6 months ago ) , sp  AVR ( bovine ) ( repaired twice ?),  s/p PPM (medtronic), HFpEF, AVF of right femoral artery, HTN, BPH, admitted with acute onset dyspnea    #acute on chronic DCHF, pulmonary edema  -recurrent HF, pulmonary edema   -was doing fine for a few days but had 1/2 can of sardines/+++salt intake few hours before onset of dyspnea  -previous CTA chest No pulmonary embolism  -no ACS  -RECENT echo with nl LV sys fx, nl fxn bio avr   -recent cath with luminal LCx/Ramus disease.  Mild proximal LAD disease.   -recent RHC with severely elevated PASP in setting of markedly elevated filling pressures/wedge pressure. Severely elevated PASP likely secondary to increased LA pressure  + RVP recently   -overloaded on exam and chest imaging with pulmonary edema  -iv bumex 2mg x1 now  -start iv bumex 1 mg bid tonight  -cont toprol 50 mg daily  -recent bubble study ok  -plan for JULIANO sharron to further eval TAVR/MV and r/o shunting in setting of recurrent HF, flash plumonary edema, hypoxia     #RLE  pseudoaneurysm/AV fistula   -during exam before attempting right femoral vein access for RHC --> incidental finding of bounding/pulsatile thrill  -Right fem artery was NOT accessed during cath   -Right LE Arterial doppler revealed  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 is AVF playing a role in HF -- RHc with low cardiac output     #PAF  -cont toprol   -sp recent PPM interrogation noted: Measured data WNL, normal pacemaker function, Pt is NOT pacemaker dependent ; Stored data revealed 3 episodes of AF lasting up to 13 hours, AF burden 0.4% as well as brief NSVT  -cont eliquis    #Hx dvt  -recent le doppler neg for dvt     #SP AVR   -stable on echo     #Sp PPM - Medtronic   -ppm interrogation as above       70 minutes spent on total encounter; more than 50% of the visit was spent counseling and/or coordinating care by the attending physician.  
Patient is a 68 yo Man with pmhx of HTN,Gout,HLD, Mild CAD, diastolic CHF, AAA s/p repair, DVT x 2 separate episodes (s/p Eliquis), s/p AVR (Bovine), s/p medtronic PPM,  BPH, hx of paroxysmal atrial fibrillation on xarelto, duplex confirmed R groin AV fistula/psuedoanyerusm who presented due to SOB, acute HF decompensation. He was previously admitted on 11/8/22 for acute CHF in which vascular surgery was consulted for the AV fistula. A duplex was obtained confirming diagnosis, and patient was discharged with outpatient f/u. Pt currently scheduled for surgical repair this Monday 11/29. Vascular consulted for re-admssion    Plan  - Patient currently scheduled for OR on Monday  - Please obtain Right lower extremity CTA with run off  -Please obtain medical and cardiac clearance if patient cleared for surgery    D/w Attending  Vascular   [id number]
Mr Martin is a 68y/o man with Pmhx of HTN, Gout, HLD, Mild CAD, diastolic CHF, AAA s/p repair, DVT x 2 separate episodes (s/p Eliquis), s/p AVR (Bovine), s/p medtronic PPM,  BPH, hx of paroxysmal atrial fibrillation on xarelto, AV fistula in his Right groin admitted with shortness of breath       AVR is a 27 perimount
Patient is a 66 yo Man with pmhx of HTN,Gout,HLD, Mild CAD, diastolic CHF, AAA s/p repair, DVT x 2 separate episodes (s/p Eliquis), s/p AVR (Bovine), s/p medtronic PPM,  BPH, hx of paroxysmal atrial fibrillation on xarelto, possible AV fistula in his Right groin who presents for shortness of breath. Found with acute decompensated diastolic heart failure.

## 2022-12-02 NOTE — PROGRESS NOTE ADULT - SUBJECTIVE AND OBJECTIVE BOX
Date of service: 12-02-22 @ 14:24      Patient is a 67y old  Male who presents with a chief complaint of Acute heart failure (02 Dec 2022 12:22)                                                               INTERVAL HPI/OVERNIGHT EVENTS:    REVIEW OF SYSTEMS:     CONSTITUTIONAL: No weakness, fevers or chills  EYES/ENT: No visual changes , no ear ache   NECK: No pain or stiffness  RESPIRATORY:" SOB overnight   CARDIOVASCULAR: No chest pain or palpitations  GASTROINTESTINAL: No abdominal pain  . No nausea, vomiting, or hematemesis; No diarrhea or constipation. No melena or hematochezia.  GENITOURINARY: No dysuria, frequency or hematuria  NEUROLOGICAL: No numbness or weakness  SKIN: No itching, burning, rashes, or lesions                                                                                                                                                                                                                                                                                 Medications:  MEDICATIONS  (STANDING):  albuterol/ipratropium for Nebulization. 3 milliLiter(s) Nebulizer once  allopurinol 200 milliGRAM(s) Oral daily  ascorbic acid 500 milliGRAM(s) Oral daily  buMETAnide Injectable 1 milliGRAM(s) IV Push two times a day  buMETAnide Injectable 1 milliGRAM(s) IV Push once  cholecalciferol 1000 Unit(s) Oral daily  enoxaparin Injectable 100 milliGRAM(s) SubCutaneous every 12 hours  finasteride 5 milliGRAM(s) Oral daily  metoprolol succinate ER 50 milliGRAM(s) Oral daily  tamsulosin 0.4 milliGRAM(s) Oral two times a day    MEDICATIONS  (PRN):       Allergies    No Known Allergies    Intolerances      Vital Signs Last 24 Hrs  T(C): 36.3 (02 Dec 2022 12:25), Max: 36.4 (02 Dec 2022 04:18)  T(F): 97.4 (02 Dec 2022 12:25), Max: 97.5 (02 Dec 2022 04:18)  HR: 68 (02 Dec 2022 12:25) (62 - 68)  BP: 102/64 (02 Dec 2022 12:25) (101/62 - 104/54)  BP(mean): --  RR: 18 (02 Dec 2022 12:25) (18 - 18)  SpO2: 96% (02 Dec 2022 12:25) (95% - 98%)    Parameters below as of 02 Dec 2022 12:25  Patient On (Oxygen Delivery Method): nasal cannula      CAPILLARY BLOOD GLUCOSE      POCT Blood Glucose.: 136 mg/dL (02 Dec 2022 05:04)      12-01 @ 07:01  -  12-02 @ 07:00  --------------------------------------------------------  IN: 800 mL / OUT: 2250 mL / NET: -1450 mL      Physical Exam:    Daily     Daily   General:  Well appearing, NAD, not cachetic  HEENT:  Nonicteric, PERRLA  CV:  RRR, S1S2   Lungs: crackles at bases   Abdomen:  Soft, non-tender, no distended, positive BS  Extremities:  2+ pulses, no c/c, no edema  Skin:  Warm and dry, no rashes  :  No crystal  Neuro:  AAOx3, non-focal, grossly intact                                                                                                                                                                                                                                                                                                LABS:                                                     RADIOLOGY & ADDITIONAL TESTS         I personally reviewed: [  ]EKG   [  ]CXR    [  ] CT      A/P:         Discussed with :     Aarti consultants' Notes   Time spent :

## 2022-12-03 LAB
ANION GAP SERPL CALC-SCNC: 15 MMOL/L — SIGNIFICANT CHANGE UP (ref 5–17)
BUN SERPL-MCNC: 48 MG/DL — HIGH (ref 7–23)
CALCIUM SERPL-MCNC: 9.7 MG/DL — SIGNIFICANT CHANGE UP (ref 8.4–10.5)
CHLORIDE SERPL-SCNC: 97 MMOL/L — SIGNIFICANT CHANGE UP (ref 96–108)
CO2 SERPL-SCNC: 26 MMOL/L — SIGNIFICANT CHANGE UP (ref 22–31)
CREAT SERPL-MCNC: 1.43 MG/DL — HIGH (ref 0.5–1.3)
EGFR: 53 ML/MIN/1.73M2 — LOW
GLUCOSE SERPL-MCNC: 119 MG/DL — HIGH (ref 70–99)
HCT VFR BLD CALC: 34.3 % — LOW (ref 39–50)
HGB BLD-MCNC: 11.2 G/DL — LOW (ref 13–17)
MCHC RBC-ENTMCNC: 31.4 PG — SIGNIFICANT CHANGE UP (ref 27–34)
MCHC RBC-ENTMCNC: 32.7 GM/DL — SIGNIFICANT CHANGE UP (ref 32–36)
MCV RBC AUTO: 96.1 FL — SIGNIFICANT CHANGE UP (ref 80–100)
NRBC # BLD: 0 /100 WBCS — SIGNIFICANT CHANGE UP (ref 0–0)
PLATELET # BLD AUTO: 149 K/UL — LOW (ref 150–400)
POTASSIUM SERPL-MCNC: 4.3 MMOL/L — SIGNIFICANT CHANGE UP (ref 3.5–5.3)
POTASSIUM SERPL-SCNC: 4.3 MMOL/L — SIGNIFICANT CHANGE UP (ref 3.5–5.3)
RBC # BLD: 3.57 M/UL — LOW (ref 4.2–5.8)
RBC # FLD: 14.8 % — HIGH (ref 10.3–14.5)
SODIUM SERPL-SCNC: 138 MMOL/L — SIGNIFICANT CHANGE UP (ref 135–145)
WBC # BLD: 7.74 K/UL — SIGNIFICANT CHANGE UP (ref 3.8–10.5)
WBC # FLD AUTO: 7.74 K/UL — SIGNIFICANT CHANGE UP (ref 3.8–10.5)

## 2022-12-03 RX ADMIN — TAMSULOSIN HYDROCHLORIDE 0.4 MILLIGRAM(S): 0.4 CAPSULE ORAL at 17:59

## 2022-12-03 RX ADMIN — FINASTERIDE 5 MILLIGRAM(S): 5 TABLET, FILM COATED ORAL at 11:15

## 2022-12-03 RX ADMIN — Medication 1000 UNIT(S): at 14:36

## 2022-12-03 RX ADMIN — ENOXAPARIN SODIUM 100 MILLIGRAM(S): 100 INJECTION SUBCUTANEOUS at 11:15

## 2022-12-03 RX ADMIN — ENOXAPARIN SODIUM 100 MILLIGRAM(S): 100 INJECTION SUBCUTANEOUS at 21:50

## 2022-12-03 RX ADMIN — BUMETANIDE 1 MILLIGRAM(S): 0.25 INJECTION INTRAMUSCULAR; INTRAVENOUS at 05:12

## 2022-12-03 RX ADMIN — TAMSULOSIN HYDROCHLORIDE 0.4 MILLIGRAM(S): 0.4 CAPSULE ORAL at 05:12

## 2022-12-03 RX ADMIN — Medication 200 MILLIGRAM(S): at 11:14

## 2022-12-03 RX ADMIN — Medication 500 MILLIGRAM(S): at 11:14

## 2022-12-03 RX ADMIN — Medication 50 MILLIGRAM(S): at 11:14

## 2022-12-03 RX ADMIN — BUMETANIDE 1 MILLIGRAM(S): 0.25 INJECTION INTRAMUSCULAR; INTRAVENOUS at 14:36

## 2022-12-03 RX ADMIN — Medication 3 MILLILITER(S): at 19:07

## 2022-12-03 NOTE — PROGRESS NOTE ADULT - SUBJECTIVE AND OBJECTIVE BOX
CARDIOLOGY FOLLOW UP - Dr. Carrera  Date of Service: 12/3/2022  CC: no events, appreciate structural eval, plan for Shubham TAVR monday    Review of Systems:  Constitutional: No fever, weight loss, or fatigue  Respiratory: No cough, wheezing, or hemoptysis, no shortness of breath  Cardiovascular: No chest pain, palpitations, passing out, dizziness, or leg swelling  Gastrointestinal: No abd or epigastric pain. No nausea, vomiting, or hematemesis; no diarrhea or consiptaiton, no melena or hematochezia  Vascular: No edema     TELEMETRY:    PHYSICAL EXAM:  T(C): 36.4 (12-03-22 @ 04:14), Max: 36.4 (12-02-22 @ 21:50)  HR: 60 (12-03-22 @ 04:14) (60 - 68)  BP: 93/53 (12-03-22 @ 04:14) (93/53 - 112/62)  RR: 18 (12-03-22 @ 04:14) (18 - 18)  SpO2: 94% (12-03-22 @ 04:14) (94% - 98%)  Wt(kg): --  I&O's Summary    02 Dec 2022 07:01  -  03 Dec 2022 07:00  --------------------------------------------------------  IN: 718 mL / OUT: 1350 mL / NET: -632 mL        Appearance: Normal	  Cardiovascular: Normal S1 S2,RRR, No JVD, No murmurs  Respiratory: Lungs clear to auscultation	  Gastrointestinal:  Soft, Non-tender, + BS	  Extremities: Normal range of motion, No clubbing, cyanosis or edema  Vascular: Peripheral pulses palpable 2+ bilaterally       Home Medications:  finasteride 5 mg oral tablet: 1 tab(s) orally once a day (24 Nov 2022 10:35)  tamsulosin 0.4 mg oral capsule: 1 cap(s) orally 2 times a day (24 Nov 2022 10:35)  Vitamin C 1000 mg oral tablet: 1 tab(s) orally once a day (24 Nov 2022 10:35)  Vitamin D3 1000 intl units oral tablet: 1 tab(s) orally once a day (24 Nov 2022 10:35)        MEDICATIONS  (STANDING):  albuterol/ipratropium for Nebulization. 3 milliLiter(s) Nebulizer once  allopurinol 200 milliGRAM(s) Oral daily  ascorbic acid 500 milliGRAM(s) Oral daily  buMETAnide Injectable 1 milliGRAM(s) IV Push two times a day  cholecalciferol 1000 Unit(s) Oral daily  enoxaparin Injectable 100 milliGRAM(s) SubCutaneous every 12 hours  finasteride 5 milliGRAM(s) Oral daily  metoprolol succinate ER 50 milliGRAM(s) Oral daily  ondansetron Injectable 4 milliGRAM(s) IV Push once  tamsulosin 0.4 milliGRAM(s) Oral two times a day        EKG:  RADIOLOGY:  DIAGNOSTIC TESTING:  [ ] Echocardiogram:  [ ] Catherterization:  [ ] Stress Test:  OTHER:     LABS:	 	                  CARDIAC MARKERS:

## 2022-12-03 NOTE — PROGRESS NOTE ADULT - SUBJECTIVE AND OBJECTIVE BOX
Date of service: 22 @ 23:50      Patient is a 67y old  Male who presents with a chief complaint of Acute heart failure (03 Dec 2022 07:09)                                                               INTERVAL HPI/OVERNIGHT EVENTS:    REVIEW OF SYSTEMS:     CONSTITUTIONAL: No weakness, fevers or chills  EYES/ENT: No visual changes , no ear ache   NECK: No pain or stiffness  RESPIRATORY: sob   CARDIOVASCULAR: No chest pain or palpitations  GASTROINTESTINAL: No abdominal pain  . No nausea, vomiting, or hematemesis; No diarrhea or constipation. No melena or hematochezia.  GENITOURINARY: No dysuria, frequency or hematuria  NEUROLOGICAL: No numbness or weakness  SKIN: No itching, burning, rashes, or lesions                                                                                                                                                                                                                                                                                 Medications:  MEDICATIONS  (STANDING):  allopurinol 200 milliGRAM(s) Oral daily  ascorbic acid 500 milliGRAM(s) Oral daily  buMETAnide Injectable 1 milliGRAM(s) IV Push two times a day  cholecalciferol 1000 Unit(s) Oral daily  enoxaparin Injectable 100 milliGRAM(s) SubCutaneous every 12 hours  finasteride 5 milliGRAM(s) Oral daily  metoprolol succinate ER 50 milliGRAM(s) Oral daily  ondansetron Injectable 4 milliGRAM(s) IV Push once  tamsulosin 0.4 milliGRAM(s) Oral two times a day    MEDICATIONS  (PRN):       Allergies    No Known Allergies    Intolerances      Vital Signs Last 24 Hrs  T(C): 36.7 (03 Dec 2022 19:19), Max: 36.7 (03 Dec 2022 12:33)  T(F): 98.1 (03 Dec 2022 19:19), Max: 98.1 (03 Dec 2022 19:19)  HR: 60 (03 Dec 2022 19:19) (60 - 91)  BP: 107/55 (03 Dec 2022 19:19) (93/53 - 112/62)  BP(mean): 73 (03 Dec 2022 19:19) (73 - 73)  RR: 18 (03 Dec 2022 19:19) (18 - 18)  SpO2: 99% (03 Dec 2022 19:19) (94% - 100%)    Parameters below as of 03 Dec 2022 19:19  Patient On (Oxygen Delivery Method): nasal cannula  O2 Flow (L/min): 2    CAPILLARY BLOOD GLUCOSE          - @ 07:01  -  12- @ 07:00  --------------------------------------------------------  IN: 718 mL / OUT: 1350 mL / NET: -632 mL     @ 07: @ 23:50  --------------------------------------------------------  IN: 320 mL / OUT: 500 mL / NET: -180 mL      Physical Exam:    Daily     Daily Weight in k.6 (03 Dec 2022 01:08)  General:  Well appearing, NAD, not cachetic  HEENT:  Nonicteric, PERRLA  CV:  RRR, S1S2   Lungs:  mild crackles   Abdomen:  Soft, non-tender, no distended, positive BS  Extremities:  2+ pulses, no c/c, no edema  Skin:  Warm and dry, no rashes  :  No crystal  Neuro:  AAOx3, non-focal, grossly intact                                                                                                                                                                                                                                                                                                LABS:                               11.2   7.74  )-----------( 149      ( 03 Dec 2022 07:49 )             34.3                          138  |  97  |  48<H>  ----------------------------<  119<H>  4.3   |  26  |  1.43<H>    Ca    9.7      03 Dec 2022 07:49                         RADIOLOGY & ADDITIONAL TESTS         I personally reviewed: [  ]EKG   [  ]CXR    [  ] CT      A/P:         Discussed with :     Aarti consultants' Notes   Time spent :

## 2022-12-04 ENCOUNTER — TRANSCRIPTION ENCOUNTER (OUTPATIENT)
Age: 67
End: 2022-12-04

## 2022-12-04 LAB
ANION GAP SERPL CALC-SCNC: 13 MMOL/L — SIGNIFICANT CHANGE UP (ref 5–17)
BLD GP AB SCN SERPL QL: NEGATIVE — SIGNIFICANT CHANGE UP
BUN SERPL-MCNC: 51 MG/DL — HIGH (ref 7–23)
CALCIUM SERPL-MCNC: 9.6 MG/DL — SIGNIFICANT CHANGE UP (ref 8.4–10.5)
CHLORIDE SERPL-SCNC: 97 MMOL/L — SIGNIFICANT CHANGE UP (ref 96–108)
CO2 SERPL-SCNC: 26 MMOL/L — SIGNIFICANT CHANGE UP (ref 22–31)
CREAT SERPL-MCNC: 1.42 MG/DL — HIGH (ref 0.5–1.3)
EGFR: 53 ML/MIN/1.73M2 — LOW
GLUCOSE SERPL-MCNC: 132 MG/DL — HIGH (ref 70–99)
MAGNESIUM SERPL-MCNC: 2.6 MG/DL — SIGNIFICANT CHANGE UP (ref 1.6–2.6)
POTASSIUM SERPL-MCNC: 4.6 MMOL/L — SIGNIFICANT CHANGE UP (ref 3.5–5.3)
POTASSIUM SERPL-SCNC: 4.6 MMOL/L — SIGNIFICANT CHANGE UP (ref 3.5–5.3)
RH IG SCN BLD-IMP: NEGATIVE — SIGNIFICANT CHANGE UP
SARS-COV-2 RNA SPEC QL NAA+PROBE: SIGNIFICANT CHANGE UP
SODIUM SERPL-SCNC: 136 MMOL/L — SIGNIFICANT CHANGE UP (ref 135–145)

## 2022-12-04 RX ORDER — CHLORHEXIDINE GLUCONATE 213 G/1000ML
1 SOLUTION TOPICAL
Refills: 0 | Status: COMPLETED | OUTPATIENT
Start: 2022-12-04 | End: 2022-12-05

## 2022-12-04 RX ORDER — GABAPENTIN 400 MG/1
200 CAPSULE ORAL ONCE
Refills: 0 | Status: COMPLETED | OUTPATIENT
Start: 2022-12-04 | End: 2022-12-05

## 2022-12-04 RX ORDER — CHLORHEXIDINE GLUCONATE 213 G/1000ML
30 SOLUTION TOPICAL ONCE
Refills: 0 | Status: COMPLETED | OUTPATIENT
Start: 2022-12-04 | End: 2022-12-05

## 2022-12-04 RX ORDER — MUPIROCIN 20 MG/G
1 OINTMENT TOPICAL
Refills: 0 | Status: DISCONTINUED | OUTPATIENT
Start: 2022-12-04 | End: 2022-12-05

## 2022-12-04 RX ORDER — CEFUROXIME AXETIL 250 MG
1500 TABLET ORAL ONCE
Refills: 0 | Status: DISCONTINUED | OUTPATIENT
Start: 2022-12-04 | End: 2022-12-05

## 2022-12-04 RX ADMIN — Medication 200 MILLIGRAM(S): at 11:34

## 2022-12-04 RX ADMIN — CHLORHEXIDINE GLUCONATE 1 APPLICATION(S): 213 SOLUTION TOPICAL at 21:00

## 2022-12-04 RX ADMIN — Medication 1000 UNIT(S): at 11:35

## 2022-12-04 RX ADMIN — ENOXAPARIN SODIUM 100 MILLIGRAM(S): 100 INJECTION SUBCUTANEOUS at 11:34

## 2022-12-04 RX ADMIN — FINASTERIDE 5 MILLIGRAM(S): 5 TABLET, FILM COATED ORAL at 11:34

## 2022-12-04 RX ADMIN — TAMSULOSIN HYDROCHLORIDE 0.4 MILLIGRAM(S): 0.4 CAPSULE ORAL at 06:00

## 2022-12-04 RX ADMIN — BUMETANIDE 1 MILLIGRAM(S): 0.25 INJECTION INTRAMUSCULAR; INTRAVENOUS at 06:00

## 2022-12-04 RX ADMIN — Medication 500 MILLIGRAM(S): at 11:39

## 2022-12-04 RX ADMIN — MUPIROCIN 1 APPLICATION(S): 20 OINTMENT TOPICAL at 22:29

## 2022-12-04 RX ADMIN — TAMSULOSIN HYDROCHLORIDE 0.4 MILLIGRAM(S): 0.4 CAPSULE ORAL at 17:12

## 2022-12-04 RX ADMIN — BUMETANIDE 1 MILLIGRAM(S): 0.25 INJECTION INTRAMUSCULAR; INTRAVENOUS at 13:26

## 2022-12-04 RX ADMIN — Medication 50 MILLIGRAM(S): at 06:00

## 2022-12-04 NOTE — PROGRESS NOTE ADULT - SUBJECTIVE AND OBJECTIVE BOX
Cardiac Surgery Pre-op Note:    CC: Patient is a 67y old  Male who presents with a chief complaint of Acute heart failure (04 Dec 2022 09:34)      Referring Physician: Dr Carrera                                                                                                           Surgeon: Dr Mckeon    Procedure: (Date) (Procedure)    Allergies    No Known Allergies    Intolerances        HPI:  Patient is a 66 yo Man with pmhx of HTN,Gout,HLD, Mild CAD, diastolic CHF, AAA s/p repair, DVT x 2 separate episodes (s/p Eliquis), s/p AVR (Bovine), s/p medtronic PPM,  BPH, hx of paroxysmal atrial fibrillation on xarelto, possible AV fistula in his Right groin who presents for shortness of breath  He was watching tv and he started feeling short of breath. This prompted him and his son to bring him to the ER. He states he has been taking medications as prescribed. He did not miss a dose. He had stopped aspirin, presumably due to mild CAD. He endorses occasional cough, and mild leg swelling. He was previously admitted on 11/8/22 for acute CHF c/b entero/rhino virus infection. He went to work on Friday after discharge. And the following Monday went to see his PCP.      (24 Nov 2022 09:53)      PAST MEDICAL & SURGICAL HISTORY:  Hypertension      History of BPH      Cardiac murmur      Malignant melanoma  left back      Cardiac pacemaker  05/2013      History of heart valve replacement  2005, 2013, porcine      S/P abdominal aortic aneurysm repair  2013          MEDICATIONS  (STANDING):  allopurinol 200 milliGRAM(s) Oral daily  ascorbic acid 500 milliGRAM(s) Oral daily  buMETAnide Injectable 1 milliGRAM(s) IV Push two times a day  cholecalciferol 1000 Unit(s) Oral daily  finasteride 5 milliGRAM(s) Oral daily  ondansetron Injectable 4 milliGRAM(s) IV Push once  tamsulosin 0.4 milliGRAM(s) Oral two times a day    MEDICATIONS  (PRN):        Labs:                        11.2   7.74  )-----------( 149      ( 03 Dec 2022 07:49 )             34.3     12-04    136  |  97  |  51<H>  ----------------------------<  132<H>  4.6   |  26  |  1.42<H>    Ca    9.6      04 Dec 2022 05:54  Mg     2.6     12-04          Blood Type: Type A  HGB A1C:   Prealbumin:   Pro-BNP:   Thyroid Panel:   MRSA:  / MSSA: MRSA/MSSA PCR (11.11.22 @ 02:33)   MRSA PCR Result.: NotDetec:       CXR: clear    EKG:< from: 12 Lead ECG (11.25.22 @ 08:48) >  Ventricular Rate 75 BPM    Atrial Rate 75 BPM    QRS Duration 180 ms    Q-T Interval 508 ms    QTC Calculation(Bazett) 567 ms    P Axis 36 degrees    R Axis 80 degrees    T Axis 29 degrees    Diagnosis Line Ventricular-paced rhythm  ABNORMAL ECG  WHEN COMPARED WITH ECG OF 24-NOV-2022 06:34,  NO SIGNIFICANT CHANGE WAS FOUND    < end of copied text >      Carotid Duplex:      PFT's:    Echocardiogram:< from: Transesophageal Echocardiogram w/o TTE (11.28.22 @ 11:50) >  Conclusions:  1. Thickened mitral valve. Moderate mitral regurgitation.  Two jets are seen one lateral P1/P2 0.17 sqcm by 3D Vena  contracta area and one more  medial P3 which measures  0.12sqcm.  2. Bioprosthetic aortic valve. Peak transaortic valve  gradient equals 25 mm Hg, mean transaortic valve gradient  equals 16 mm Hg, which is probably normal in the presence  of a bioprosthetic aortic valve. Moderate-Severe eccentric  aortic regurgitation which appears paravalvular at 11-1pm  on short axis.  3. Mild atheroma noted in aortic arch/descending aorta.  There is echogenic material seen in the proximal ascending  aorta consistant with prior hemiarch replacement.  4.  No left atrium or left atrial appendage thrombus.  5. Normal left ventricular internal dimensions and wall  thicknesses.  6. Normal left ventricular systolic function.  7. Right ventricular enlargement with mildly decreased  right ventricular systolic function.  8. Normal tricuspid valve. Mild-moderate tricuspid  regurgitation.  9. Estimated pulmonary artery systolic pressure equals 53  mm Hg, assuming right atrial pressure equals 8 mm Hg,  consistent with moderate pulmonary pressures.    < end of copied text >      Cardiac catheterization:    Vein Mapping:    Gen: WN/WD NAD  Neuro: AAOx3, nonfocal  Pulm: CTA B/L  CV: RRR, S1S2  Abd: Soft, NT, ND +BS  Ext: No edema, + peripheral pulses      Pt has AICD/PPM [a ] Yes  [ ] No             Brand Name: Medtronic interrogated 11/28/2022  Pre-op Beta Blocker ordered within 24 hrs of surgery ([facility] ONLY)?  [ ] Yes  [x ] No  If not, Why? tania arrhythmia  Type & Cross  [x ] Yes  [ ] No  NPO after Midnight [ ] Yes  [ ] No  Pre-op ABX ordered, to be taped on chart:  [ ] Yes  [ ] No     Hibiclens/Peridex ordered x[ ] Yes  [ ] No  Intraop on Hold: PRBCs, CXR, JULIANO [x ]   Consent obtained  [ x] Yes  [ ] No

## 2022-12-04 NOTE — PROGRESS NOTE ADULT - SUBJECTIVE AND OBJECTIVE BOX
CARDIOLOGY FOLLOW UP - Dr. Carrera  Date of Service: 12/4/2022  CC: no complaints    Review of Systems:  Constitutional: No fever, weight loss, or fatigue  Respiratory: No cough, wheezing, or hemoptysis, no shortness of breath  Cardiovascular: No chest pain, palpitations, passing out, dizziness, or leg swelling  Gastrointestinal: No abd or epigastric pain. No nausea, vomiting, or hematemesis; no diarrhea or consiptaiton, no melena or hematochezia  Vascular: No edema     TELEMETRY:    PHYSICAL EXAM:  T(C): 36.3 (12-04-22 @ 04:59), Max: 36.7 (12-03-22 @ 12:33)  HR: 60 (12-04-22 @ 04:59) (60 - 91)  BP: 115/60 (12-04-22 @ 04:59) (94/55 - 115/60)  RR: 18 (12-04-22 @ 04:59) (18 - 18)  SpO2: 97% (12-04-22 @ 04:59) (97% - 100%)  Wt(kg): --  I&O's Summary    03 Dec 2022 07:01  -  04 Dec 2022 07:00  --------------------------------------------------------  IN: 520 mL / OUT: 800 mL / NET: -280 mL        Appearance: Normal	  Cardiovascular: Normal S1 S2,RRR, No JVD, No murmurs  Respiratory: Lungs clear to auscultation	  Gastrointestinal:  Soft, Non-tender, + BS	  Extremities: Normal range of motion, No clubbing, cyanosis or edema  Vascular: Peripheral pulses palpable 2+ bilaterally       Home Medications:  finasteride 5 mg oral tablet: 1 tab(s) orally once a day (24 Nov 2022 10:35)  tamsulosin 0.4 mg oral capsule: 1 cap(s) orally 2 times a day (24 Nov 2022 10:35)  Vitamin C 1000 mg oral tablet: 1 tab(s) orally once a day (24 Nov 2022 10:35)  Vitamin D3 1000 intl units oral tablet: 1 tab(s) orally once a day (24 Nov 2022 10:35)        MEDICATIONS  (STANDING):  allopurinol 200 milliGRAM(s) Oral daily  ascorbic acid 500 milliGRAM(s) Oral daily  buMETAnide Injectable 1 milliGRAM(s) IV Push two times a day  cholecalciferol 1000 Unit(s) Oral daily  enoxaparin Injectable 100 milliGRAM(s) SubCutaneous every 12 hours  finasteride 5 milliGRAM(s) Oral daily  metoprolol succinate ER 50 milliGRAM(s) Oral daily  ondansetron Injectable 4 milliGRAM(s) IV Push once  tamsulosin 0.4 milliGRAM(s) Oral two times a day        EKG:  RADIOLOGY:  DIAGNOSTIC TESTING:  [ ] Echocardiogram:  [ ] Catherterization:  [ ] Stress Test:  OTHER:     LABS:	 	                          11.2   7.74  )-----------( 149      ( 03 Dec 2022 07:49 )             34.3     12-04    136  |  97  |  51<H>  ----------------------------<  132<H>  4.6   |  26  |  1.42<H>    Ca    9.6      04 Dec 2022 05:54  Mg     2.6     12-04            CARDIAC MARKERS:          Assessment and Plan:   · Assessment	  68 y/o male with hx of ascending aortic aneurysm repair, DVT ( two sepearte occasions ) was on Eliquis ( last used 6 months ago ) , sp  AVR ( bovine ) ( repaired twice ?),  s/p PPM (medtronic), HFpEF, AVF of right femoral artery, HTN, BPH, admitted with acute onset dyspnea    #acute on chronic DCHF, pulmonary edema  -recurrent HF, pulmonary edema likely d/t  severe paravalvular AI and moderate MR as seen on JULIANO  -was doing fine for a few days but had 1/2 can of sardines/+++salt intake few hours before onset of dyspnea  -previous CTA chest No pulmonary embolism  -no ACS  -RECENT echo with nl LV sys fx, nl fxn bio avr   -recent cath with luminal LCx/Ramus disease.  Mild proximal LAD disease.   -recent RHC with severely elevated PASP in setting of markedly elevated filling pressures/wedge pressure. Severely elevated PASP likely secondary to increased LA pressure  + RVP recently   -recent bubble study ok  -Pt on 2L NC right now, continue supplemental O2 as necessary  -PPM interrogation 11/28 - noted w/ normal pacemaker fxn  -JULIANO w/ with severe paravalvular AI and moderate MR   -Appreciate CT sx consult  -Plan for Shubham TAVR on Monday 12/5  -cont iv bumex as ordered bid for now  -cont toprol 50 mg daily    #Aortic Insufficiency/Moderate MR  -JULIANO results as mentioned above  -Plan for redo AVR and Poss MVR on Monday 12/5    #RLE pseudoaneurysm/AV fistula   -during exam before attempting right femoral vein access for RHC --> incidental finding of bounding/pulsatile thrill  -Right fem artery was NOT accessed during cath   -Right LE Arterial doppler revealed  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 Data within normal pacemaking function. Pt with known afib  -cont eliquis    #Hx dvt  -recent le doppler neg for dvt     #SP AVR   -stable on echo  -JULIANO w/ with severe paravalvular AI and moderate MR    #Sp PPM - Medtronic   -Recent ppm interrogation as mentioned above      35 minutes spent on total encounter; more than 50% of the visit was spent counseling and/or coordinating care by the attending physician.           CARDIOLOGY FOLLOW UP - Dr. Carrera  Date of Service: 12/4/2022  CC: no complaints    Review of Systems:  Constitutional: No fever, weight loss, or fatigue  Respiratory: No cough, wheezing, or hemoptysis, no shortness of breath  Cardiovascular: No chest pain, palpitations, passing out, dizziness, or leg swelling  Gastrointestinal: No abd or epigastric pain. No nausea, vomiting, or hematemesis; no diarrhea or consiptaiton, no melena or hematochezia  Vascular: No edema     TELEMETRY:    PHYSICAL EXAM:  T(C): 36.3 (12-04-22 @ 04:59), Max: 36.7 (12-03-22 @ 12:33)  HR: 60 (12-04-22 @ 04:59) (60 - 91)  BP: 115/60 (12-04-22 @ 04:59) (94/55 - 115/60)  RR: 18 (12-04-22 @ 04:59) (18 - 18)  SpO2: 97% (12-04-22 @ 04:59) (97% - 100%)  Wt(kg): --  I&O's Summary    03 Dec 2022 07:01  -  04 Dec 2022 07:00  --------------------------------------------------------  IN: 520 mL / OUT: 800 mL / NET: -280 mL        Appearance: Normal	  Cardiovascular: Normal S1 S2,RRR, No JVD, No murmurs  Respiratory: Lungs clear to auscultation	  Gastrointestinal:  Soft, Non-tender, + BS	  Extremities: Normal range of motion, No clubbing, cyanosis or edema  Vascular: Peripheral pulses palpable 2+ bilaterally       Home Medications:  finasteride 5 mg oral tablet: 1 tab(s) orally once a day (24 Nov 2022 10:35)  tamsulosin 0.4 mg oral capsule: 1 cap(s) orally 2 times a day (24 Nov 2022 10:35)  Vitamin C 1000 mg oral tablet: 1 tab(s) orally once a day (24 Nov 2022 10:35)  Vitamin D3 1000 intl units oral tablet: 1 tab(s) orally once a day (24 Nov 2022 10:35)        MEDICATIONS  (STANDING):  allopurinol 200 milliGRAM(s) Oral daily  ascorbic acid 500 milliGRAM(s) Oral daily  buMETAnide Injectable 1 milliGRAM(s) IV Push two times a day  cholecalciferol 1000 Unit(s) Oral daily  enoxaparin Injectable 100 milliGRAM(s) SubCutaneous every 12 hours  finasteride 5 milliGRAM(s) Oral daily  metoprolol succinate ER 50 milliGRAM(s) Oral daily  ondansetron Injectable 4 milliGRAM(s) IV Push once  tamsulosin 0.4 milliGRAM(s) Oral two times a day        EKG:  RADIOLOGY:  DIAGNOSTIC TESTING:  [ ] Echocardiogram:  [ ] Catherterization:  [ ] Stress Test:  OTHER:     LABS:	 	                          11.2   7.74  )-----------( 149      ( 03 Dec 2022 07:49 )             34.3     12-04    136  |  97  |  51<H>  ----------------------------<  132<H>  4.6   |  26  |  1.42<H>    Ca    9.6      04 Dec 2022 05:54  Mg     2.6     12-04            CARDIAC MARKERS:          Assessment and Plan:   · Assessment	  66 y/o male with hx of ascending aortic aneurysm repair, DVT ( two sepearte occasions ) was on Eliquis ( last used 6 months ago ) , sp  AVR ( bovine ) ( repaired twice ?),  s/p PPM (medtronic), HFpEF, AVF of right femoral artery, HTN, BPH, admitted with acute onset dyspnea    #acute on chronic DCHF, pulmonary edema  -recurrent HF, pulmonary edema likely d/t  severe paravalvular AI and moderate MR as seen on JULIANO  -was doing fine for a few days but had 1/2 can of sardines/+++salt intake few hours before onset of dyspnea  -previous CTA chest No pulmonary embolism  -no ACS  -RECENT echo with nl LV sys fx, nl fxn bio avr   -recent cath with luminal LCx/Ramus disease.  Mild proximal LAD disease.   -recent RHC with severely elevated PASP in setting of markedly elevated filling pressures/wedge pressure. Severely elevated PASP likely secondary to increased LA pressure  + RVP recently   -recent bubble study ok  -Pt on 2L NC right now, continue supplemental O2 as necessary  -PPM interrogation 11/28 - noted w/ normal pacemaker fxn  -JULIANO w/ with severe paravalvular AI and moderate MR   -Appreciate CT sx consult  -Plan for Shubham TAVR tomorrow 12/5  -cont iv bumex as ordered bid for now  -cont toprol 50 mg daily    #Aortic Insufficiency/Moderate MR  -JULIANO results as mentioned above  -Plan for Shubham TAVR on Monday 12/5    #RLE pseudoaneurysm/AV fistula   -during exam before attempting right femoral vein access for RHC --> incidental finding of bounding/pulsatile thrill  -Right fem artery was NOT accessed during cath   -Right LE Arterial doppler revealed  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 Data within normal pacemaking function. Pt with known afib  -cont eliquis    #Hx dvt  -recent le doppler neg for dvt     #SP AVR   -stable on echo  -JULIANO w/ with severe paravalvular AI and moderate MR    #Sp PPM - Medtronic   -Recent ppm interrogation as mentioned above      35 minutes spent on total encounter; more than 50% of the visit was spent counseling and/or coordinating care by the attending physician.

## 2022-12-04 NOTE — PROGRESS NOTE ADULT - SUBJECTIVE AND OBJECTIVE BOX
I came to meet Mr Martin and discuss the procedure that is planned for tomorrow morning--a Valve in Valve TAVR for a degenerated/failing bioprosthetic AVR with severe AI in a patient with new onset symptomatic acute heart failure. He had a CT that demonstrates AV anatomy that we feel is suitable for Shubham TAVR (aka TAV in MARIA INES). We also discussed that he has at least moderate MR, multiple jets, with anatomy that is not amenable to MV MARTHA (i.e. MitraClip). We discussed his treatment options, including a 3rd re-op cardiac surgery (he would likely need an AVR/MVR), which he has discussed with Dr Mckeon in detail, or a catheter-based approach--the latter of which is his strong preference. As such, we would proceed with a Shubham TAVR tomorrow (likely using a 26mm Maddy 3 Ultra THV in his failing 27mm tissue AVR) via TF access. Given the complexity of the procedure and his inability to lie flat without becoming very SOB (he has been sleeping in his bedside recliner), I would recommend we do this procedure with general anesthesia and JULIANO guidance--he understands and is fine with that approach. He will discuss this further with Cardiac Anesthesia prior to the procedure as well. As I also explained, with respect to his MR, this would be re-evaluated after his Shubham TAVR procedure. Given that the anatomy is suboptimal for MARTHA (MitraClip), I discussed the possibility of him being evaluated for TMVR as part of a clinical trial (no sooner than 90 days following his Shubham TAVR, as per study protocol). In the mean time, his MR would be medically managed. It is possible that after his Shubham TAVR that his MR improves and may not warrant intervention. I discussed the risks and benefits of the Shubham TAVR including death, stroke, PVL, vascular complications, infection, and the possible need for a PPM; our hope of course is that it will improve his symptoms of fatigue, heart failure, dyspnea on exertion, and significant functional decline, all of which have been present for the past 2-3 months but became acutely worse in the past few weeks, prompting his hospital admission. All questions were answered in detail and he provided informed consent.

## 2022-12-04 NOTE — PROGRESS NOTE ADULT - SUBJECTIVE AND OBJECTIVE BOX
Date of service: 22 @ 21:49      Patient is a 67y old  Male who presents with a chief complaint of Acute Heart Failure Secondary to a Degenerated BioAVR with Severe AI (04 Dec 2022 15:38)                                                               INTERVAL HPI/OVERNIGHT EVENTS:    REVIEW OF SYSTEMS:     CONSTITUTIONAL: No weakness, fevers or chills  EYES/ENT: No visual changes , no ear ache   NECK: No pain or stiffness  RESPIRATORY: sob  CARDIOVASCULAR: No chest pain or palpitations  GASTROINTESTINAL: No abdominal pain  . No nausea, vomiting, or hematemesis; No diarrhea or constipation. No melena or hematochezia.  GENITOURINARY: No dysuria, frequency or hematuria  NEUROLOGICAL: No numbness or weakness                                                                                                                                                                                                                                                                               Medications:  MEDICATIONS  (STANDING):  allopurinol 200 milliGRAM(s) Oral daily  ascorbic acid 500 milliGRAM(s) Oral daily  buMETAnide Injectable 1 milliGRAM(s) IV Push two times a day  cefuroxime  IVPB 1500 milliGRAM(s) IV Intermittent once  chlorhexidine 0.12% Liquid 30 milliLiter(s) Swish and Spit once  chlorhexidine 4% Liquid 1 Application(s) Topical two times a day  cholecalciferol 1000 Unit(s) Oral daily  finasteride 5 milliGRAM(s) Oral daily  gabapentin 200 milliGRAM(s) Oral once  mupirocin 2% Ointment 1 Application(s) Both Nostrils two times a day  ondansetron Injectable 4 milliGRAM(s) IV Push once  tamsulosin 0.4 milliGRAM(s) Oral two times a day    MEDICATIONS  (PRN):       Allergies    No Known Allergies    Intolerances      Vital Signs Last 24 Hrs  T(C): 36.3 (04 Dec 2022 19:44), Max: 36.7 (04 Dec 2022 12:16)  T(F): 97.3 (04 Dec 2022 19:44), Max: 98 (04 Dec 2022 12:16)  HR: 60 (04 Dec 2022 19:44) (60 - 81)  BP: 109/64 (04 Dec 2022 19:44) (99/55 - 115/60)  BP(mean): 79 (04 Dec 2022 19:44) (79 - 79)  RR: 18 (04 Dec 2022 19:44) (18 - 18)  SpO2: 97% (04 Dec 2022 19:44) (97% - 99%)    Parameters below as of 04 Dec 2022 19:44  Patient On (Oxygen Delivery Method): nasal cannula      CAPILLARY BLOOD GLUCOSE           @ 07:  -   @ 07:00  --------------------------------------------------------  IN: 520 mL / OUT: 800 mL / NET: -280 mL     @ 07:  -   @ 21:49  --------------------------------------------------------  IN: 360 mL / OUT: 300 mL / NET: 60 mL      Physical Exam:    Daily     Daily Weight in k.9 (04 Dec 2022 07:20)  General:  Well appearing, NAD, not cachetic  HEENT:  Nonicteric, PERRLA  CV:  RRR, S1S2   Lungs:  mild crackles  Abdomen:  Soft, non-tender, no distended, positive BS  Extremities:  2+ pulses, no c/c, no edema  Skin:  Warm and dry, no rashes  :  No crystal  Neuro:  AAOx3, non-focal, grossly intact                                                                                                                                                                                                                                                                                                LABS:                               11.2   7.74  )-----------( 149      ( 03 Dec 2022 07:49 )             34.3                          136  |  97  |  51<H>  ----------------------------<  132<H>  4.6   |  26  |  1.42<H>    Ca    9.6      04 Dec 2022 05:54  Mg     2.6                              RADIOLOGY & ADDITIONAL TESTS         I personally reviewed: [  ]EKG   [  ]CXR    [  ] CT      A/P:         Discussed with :     Aarti consultants' Notes   Time spent :

## 2022-12-05 ENCOUNTER — APPOINTMENT (OUTPATIENT)
Dept: CARDIOTHORACIC SURGERY | Facility: HOSPITAL | Age: 67
End: 2022-12-05

## 2022-12-05 PROCEDURE — 93355 ECHO TRANSESOPHAGEAL (TEE): CPT

## 2022-12-05 PROCEDURE — 33361 REPLACE AORTIC VALVE PERQ: CPT | Mod: 62,Q0

## 2022-12-05 PROCEDURE — 94010 BREATHING CAPACITY TEST: CPT | Mod: 26

## 2022-12-05 PROCEDURE — 99232 SBSQ HOSP IP/OBS MODERATE 35: CPT | Mod: FS

## 2022-12-05 PROCEDURE — 93010 ELECTROCARDIOGRAM REPORT: CPT

## 2022-12-05 DEVICE — CATH VASCULAR EXPO EXPO AMPLATZ LEFT CURVE (AL1) 0.045" X 5FR X 100CM: Type: IMPLANTABLE DEVICE | Status: FUNCTIONAL

## 2022-12-05 DEVICE — WIRE GUIDE EXCHANGE J TIP 3MM X 260CM: Type: IMPLANTABLE DEVICE | Status: FUNCTIONAL

## 2022-12-05 DEVICE — IMPLANTABLE DEVICE: Type: IMPLANTABLE DEVICE | Status: FUNCTIONAL

## 2022-12-05 DEVICE — GWIRE STR .038X180 STIFF LONG TAPR: Type: IMPLANTABLE DEVICE | Status: FUNCTIONAL

## 2022-12-05 DEVICE — CATH VASCULAR EXPO FEMORAL LEFT CURVE (FL4) 0.045" X 5FR X 100CM: Type: IMPLANTABLE DEVICE | Status: FUNCTIONAL

## 2022-12-05 DEVICE — SUT PERCLOSE PROGLIDE 6FR: Type: IMPLANTABLE DEVICE | Status: FUNCTIONAL

## 2022-12-05 DEVICE — SYS CEREBRAL PROT NCT04149535 SENTINEL FOR STUDY ONLY: Type: IMPLANTABLE DEVICE | Status: FUNCTIONAL

## 2022-12-05 DEVICE — CATH VASCULAR EXPO VENTRICULAR PIGTAIL CURVE (PIG 145) 0.045" X 5FR X 10CM: Type: IMPLANTABLE DEVICE | Status: FUNCTIONAL

## 2022-12-05 DEVICE — GWIRE GUID  0.035INX150CM: Type: IMPLANTABLE DEVICE | Status: FUNCTIONAL

## 2022-12-05 DEVICE — SHEATH INTRODUCER TERUMO PINNACLE CORONARY 6FR X 10CM X 0.038" MINI WIRE: Type: IMPLANTABLE DEVICE | Status: FUNCTIONAL

## 2022-12-05 DEVICE — GWIRE RUNTHRU NS .014X300CM: Type: IMPLANTABLE DEVICE | Status: FUNCTIONAL

## 2022-12-05 DEVICE — PACER KT BLLN FLW DIR 5FR: Type: IMPLANTABLE DEVICE | Status: FUNCTIONAL

## 2022-12-05 DEVICE — GUIDEWIRE AMPLATZ EXTRA-STIFF CURVED .035" X 260CM: Type: IMPLANTABLE DEVICE | Status: FUNCTIONAL

## 2022-12-05 DEVICE — CATH VASCULAR EXPO VENTRICULAR PIGTAIL CURVE (PIG) 0.045" X 5FR X 100CM: Type: IMPLANTABLE DEVICE | Status: FUNCTIONAL

## 2022-12-05 DEVICE — WIRE GD SAFARI2 275CM XSML CRV: Type: IMPLANTABLE DEVICE | Status: FUNCTIONAL

## 2022-12-05 DEVICE — VLV HEART SAPIEN 3 ULTRA W/COMMANDER SYS 26MM: Type: IMPLANTABLE DEVICE | Status: FUNCTIONAL

## 2022-12-05 DEVICE — SHEATH INTRODUCER TERUMO PINNACLE CORONARY 8FR X 10CM X 0.038" MINI WIRE: Type: IMPLANTABLE DEVICE | Status: FUNCTIONAL

## 2022-12-05 RX ORDER — CEFUROXIME AXETIL 250 MG
1500 TABLET ORAL EVERY 8 HOURS
Refills: 0 | Status: COMPLETED | OUTPATIENT
Start: 2022-12-05 | End: 2022-12-05

## 2022-12-05 RX ORDER — ATORVASTATIN CALCIUM 80 MG/1
20 TABLET, FILM COATED ORAL AT BEDTIME
Refills: 0 | Status: DISCONTINUED | OUTPATIENT
Start: 2022-12-05 | End: 2022-12-07

## 2022-12-05 RX ORDER — TAMSULOSIN HYDROCHLORIDE 0.4 MG/1
0.4 CAPSULE ORAL AT BEDTIME
Refills: 0 | Status: DISCONTINUED | OUTPATIENT
Start: 2022-12-05 | End: 2022-12-07

## 2022-12-05 RX ORDER — ALLOPURINOL 300 MG
200 TABLET ORAL DAILY
Refills: 0 | Status: DISCONTINUED | OUTPATIENT
Start: 2022-12-05 | End: 2022-12-07

## 2022-12-05 RX ORDER — ASPIRIN/CALCIUM CARB/MAGNESIUM 324 MG
81 TABLET ORAL DAILY
Refills: 0 | Status: DISCONTINUED | OUTPATIENT
Start: 2022-12-05 | End: 2022-12-07

## 2022-12-05 RX ORDER — FINASTERIDE 5 MG/1
5 TABLET, FILM COATED ORAL DAILY
Refills: 0 | Status: DISCONTINUED | OUTPATIENT
Start: 2022-12-05 | End: 2022-12-07

## 2022-12-05 RX ADMIN — BUMETANIDE 1 MILLIGRAM(S): 0.25 INJECTION INTRAMUSCULAR; INTRAVENOUS at 05:25

## 2022-12-05 RX ADMIN — Medication 100 MILLIGRAM(S): at 21:19

## 2022-12-05 RX ADMIN — TAMSULOSIN HYDROCHLORIDE 0.4 MILLIGRAM(S): 0.4 CAPSULE ORAL at 05:25

## 2022-12-05 RX ADMIN — ATORVASTATIN CALCIUM 20 MILLIGRAM(S): 80 TABLET, FILM COATED ORAL at 21:20

## 2022-12-05 RX ADMIN — GABAPENTIN 200 MILLIGRAM(S): 400 CAPSULE ORAL at 06:26

## 2022-12-05 RX ADMIN — CHLORHEXIDINE GLUCONATE 30 MILLILITER(S): 213 SOLUTION TOPICAL at 06:26

## 2022-12-05 RX ADMIN — TAMSULOSIN HYDROCHLORIDE 0.4 MILLIGRAM(S): 0.4 CAPSULE ORAL at 21:20

## 2022-12-05 RX ADMIN — FINASTERIDE 5 MILLIGRAM(S): 5 TABLET, FILM COATED ORAL at 16:29

## 2022-12-05 RX ADMIN — Medication 200 MILLIGRAM(S): at 16:29

## 2022-12-05 RX ADMIN — Medication 100 MILLIGRAM(S): at 16:22

## 2022-12-05 RX ADMIN — CHLORHEXIDINE GLUCONATE 1 APPLICATION(S): 213 SOLUTION TOPICAL at 06:25

## 2022-12-05 RX ADMIN — MUPIROCIN 1 APPLICATION(S): 20 OINTMENT TOPICAL at 07:25

## 2022-12-05 RX ADMIN — Medication 81 MILLIGRAM(S): at 16:29

## 2022-12-05 NOTE — PRE-ANESTHESIA EVALUATION ADULT - NSANTHPMHFT_GEN_ALL_CORE
67M PMH HTN, AVR/ascending aorta replacement, reop AVR (2015), gout, PPM, a. fib, DVT, BPH p/w mod MR, severe prosthetic valve AI, elevated PCWP/PAP.
68 y/o male with hx of ascending aortic aneurysm repair, DVT ( two sepearte occasions ) was on Eliquis ( last used 6 months ago ) , sp  AVR ( bovine ) ( repaired twice ?), s/p PPM, AVF of right femoral artery (post-operative finding), HTN, BPH, admitted with acute onset dyspnea - found to be in heart failure/pulmonary edema.  JULIANO to investigate cardiac source

## 2022-12-05 NOTE — CHART NOTE - NSCHARTNOTEFT_GEN_A_CORE
MEDICINE NP    PARISH UMAIR  67y Male    Patient is a 67y old  Male who presents with a chief complaint of Acute heart failure (24 Nov 2022 13:10)         > Event Summary:   Notified by RN, Patient with c/o SOB @rest, placed on 2LNC for relief.    Patient seen at bedside, OOB to Chair, reporting intermittent SOB, starting again tonight but now resolved.  Denies associated chest pain, palpitations, dyspnea.  Exam w/ Respiration unlabored, no accessory muscle use, RR=21.  Lungs w/ fine rales @ bases.    -SpO2 97% on RA  -Given SOB now resolved, will c/w monitoring  -C/w Bumex iv BID  -Duonebs x1 and prn   -F/u Cardiology in AM  -Will endorse to Day Provider in AM and Attending to follow          -Vital Signs Last 24 Hrs  T(C): 36.2 (24 Nov 2022 21:10), Max: 36.6 (24 Nov 2022 08:00)  T(F): 97.2 (24 Nov 2022 21:10), Max: 97.8 (24 Nov 2022 08:00)  HR: 61 (24 Nov 2022 21:10) (60 - 62)  BP: 103/61 (24 Nov 2022 21:10) (100/45 - 122/66)  BP(mean): 56 (24 Nov 2022 06:25) (55 - 80)  RR: 18 (24 Nov 2022 21:10) (17 - 19)  SpO2: 96% (24 Nov 2022 21:10) (96% - 100%)    Parameters below as of 24 Nov 2022 21:10  Patient On (Oxygen Delivery Method): room air        LYLE Casillas-BC  Medicine Department  #[id number]
R CFA AVF/pseudoaneurysm repair previously scheduled. Now postponed due to more urgent valve surgery needed. Vascular surgery to signoff. If able, would recommend to avoid accessing R groin for catheters/access due to presence of chronic PSA. Please call the vascular surgery team for questions or concerns.
RD CHF education chart note    Patient was visited by RD for CHF education. Heart failure education provided to the patient in detail. Discussed heart failure nutrition therapy, sodium and fluid intake, importance of diet adherence, daily weights monitoring with the patient. Reinforced importance of weight gain parameters and importance of contacting MD’s about weight changes. Provided handouts on heart failure nutrition therapy, reading heart healthy nutrition labels, heart healthy shopping tips and low sodium food list. Patient verbalized understanding demonstrated by teach back method. RD contact information left with patient for any future questioning.    Aide Max MPH, RD, CDN - TEAMS/Pager# [phone]
Pt s/p TAVR/TOSHIA 16 mm Maddy 3 via Left FA with Perclose x 2 and LFV with 8 Fr sheath removed held manual pressure.     - RRA with band in place 2/2 Delcambre procedure and Left radial roma in place neurvascular status intact +2 pulses radial bilateral with no hematoma/bleeding    Pt done under general anesthesia arrived awake and alert with NRB in place desat to 80s on RA replaced with sat 98%     Post ECG V-paced @60    HPI:  Patient is a 66 yo Man with pmhx of HTN,Gout,HLD, Mild CAD, diastolic CHF, AAA s/p repair, DVT x 2 separate episodes (s/p Eliquis), s/p AVR (Bovine), s/p medtronic PPM,  BPH, hx of paroxysmal atrial fibrillation on xarelto, possible AV fistula in his Right groin who presents for shortness of breath  He was watching tv and he started feeling short of breath. This prompted him and his son to bring him to the ER. He states he has been taking medications as prescribed. He did not miss a dose. He had stopped aspirin, presumably due to mild CAD. He endorses occasional cough, and mild leg swelling. He was previously admitted on 11/8/22 for acute CHF c/b entero/rhino virus infection.  Admitted for acute HFpEF exacerbation diuresed with mild MARIO now s/p TAVR/TOSHIA resting comfortably    Plan:  Continue tele and site monitoring with neurovascular checks per protocol  Cefuroxime 1500mg q8H x 2 doses next dose due at 1600  ASA 81mg only due at 1600  On Xarelto for AF/DVT may resume on 12/6 if sites stable  Remove Radial band and [place] at 1030 monitor sites per protocol  Transfer to Floor 2 [facility] bed when available once fully recovered    Kristy Randle, [facility]-BC  Cardiology

## 2022-12-05 NOTE — PROGRESS NOTE ADULT - SUBJECTIVE AND OBJECTIVE BOX
VITAL SIGNS    Telemetry:  afib V. paced 60  Vital Signs Last 24 Hrs  T(C): 36.7 (12-05-22 @ 09:33), Max: 36.7 (12-05-22 @ 04:49)  T(F): 98 (12-05-22 @ 09:33), Max: 98 (12-05-22 @ 04:49)  HR: 60 (12-05-22 @ 12:50) (59 - 64)  BP: 117/61 (12-05-22 @ 12:50) (108/62 - 129/60)  RR: 18 (12-05-22 @ 12:50) (10 - 22)  SpO2: 96% (12-05-22 @ 12:50) (88% - 99%)            12-04 @ 07:01  -  12-05 @ 07:00  --------------------------------------------------------  IN: 720 mL / OUT: 550 mL / NET: 170 mL       Daily Height in cm: 182.88 (05 Dec 2022 08:10)    Daily   Admit Wt: Drug Dosing Weight  Height (cm): 182.9 (05 Dec 2022 08:10)  Weight (kg): 99.8 (05 Dec 2022 08:10)  BMI (kg/m2): 29.8 (05 Dec 2022 08:10)  BSA (m2): 2.22 (05 Dec 2022 08:10)        aspirin enteric coated 81 milliGRAM(s) Oral daily  cefuroxime  IVPB 1500 milliGRAM(s) IV Intermittent every 8 hours      PHYSICAL EXAM    Subjective: "I need to urinate."   Neurology: alert and oriented x 3, nonfocal, no gross deficits  CV : tele:  afib V. paced 60  Lungs: clear. RR easy, unlabored   Abdomen: soft, nontender, nondistended, positive bowel sounds, neg bowel movement   Neg N/V/D   :  pt voiding without difficulty   Extremities:   BARONE; trace LE edema, neg calf tenderness.   PPP bilaterally; left groin cdi w/ dressing; + rt groin bounding thrill           67M of HTN,Gout,HLD, Mild CAD, diastolic CHF, AAA s/p repair, DVT x 2 separate episodes (s/p Eliquis), s/p AVR (Bovine), s/p medtronic PPM,  BPH, hx of paroxysmal atrial fibrillation on xarelto, possible AV fistula in his Right groin who presents for shortness of breath  He was watching tv and he started feeling short of breath. This prompted him and his son to bring him to the ER. He states he has been taking medications as prescribed. He did not miss a dose. He had stopped aspirin, presumably due to mild CAD. He endorses occasional cough, and mild leg swelling. He was previously admitted on 11/8/22 for acute CHF c/b entero/rhino virus infection. He went to work on Friday after discharge. And the following Monday went to see his PCP.       Problem #1:  s/p 12/5 TAVR- TOSHIA  tele  ekg qd  ck echo in am tue  monitor bilateral groin sites   asa today  resume xarelto in am if left groin site stable    Problem #2:  MR  struct heart following  MARTHA/ TMVR in the future as per DR. Rodriguez       Problem #3:  rt groin psa/avf  vasc following  ? surgery on this admission      discharge planning- home when stable- no mcot - pt has a Medtronic PPM

## 2022-12-05 NOTE — BRIEF OPERATIVE NOTE - NSICDXBRIEFPROCEDURE_GEN_ALL_CORE_FT
PROCEDURES:  Percutaneous transcatheter aortic valve implantation ([facility]) 05-Dec-2022 09:39:08 #26 [place] [facility] valve Linda Egan

## 2022-12-05 NOTE — PROGRESS NOTE ADULT - SUBJECTIVE AND OBJECTIVE BOX
CARDIOLOGY FOLLOW UP NOTE - DR. RIBEIRO    Patient Name: UMAIR LUGO  Date of Service: 12-05-22 @ 16:02    Patient seen and examined    Subjective:    cv: denies chest pain, dyspnea, palpitations, dizziness  pulmonary: denies cough  GI: denies abdominal pain, nausea, vomiting  vascular/legs: no edema   skin: no rash  ROS: otherwise negative   overnight events:      PHYSICAL EXAM:  T(C): 36.6 (12-05-22 @ 13:35), Max: 36.7 (12-05-22 @ 04:49)  HR: 61 (12-05-22 @ 13:35) (59 - 64)  BP: 119/69 (12-05-22 @ 13:35) (108/62 - 129/60)  RR: 18 (12-05-22 @ 13:35) (10 - 22)  SpO2: 92% (12-05-22 @ 13:35) (88% - 99%)  Wt(kg): --  I&O's Summary    04 Dec 2022 07:01  -  05 Dec 2022 07:00  --------------------------------------------------------  IN: 720 mL / OUT: 550 mL / NET: 170 mL    05 Dec 2022 07:01  -  05 Dec 2022 16:02  --------------------------------------------------------  IN: 360 mL / OUT: 325 mL / NET: 35 mL      Daily Height in cm: 182.88 (05 Dec 2022 08:10)    Daily     Appearance: Normal	  Cardiovascular: Normal S1 S2,RRR, No JVD, No murmurs  Respiratory: Lungs clear to auscultation	  Gastrointestinal:  Soft, Non-tender, + BS	  Extremities: Normal range of motion, No clubbing, cyanosis or edema      Home Medications:  finasteride 5 mg oral tablet: 1 tab(s) orally once a day (24 Nov 2022 10:35)  tamsulosin 0.4 mg oral capsule: 1 cap(s) orally 2 times a day (24 Nov 2022 10:35)  Vitamin C 1000 mg oral tablet: 1 tab(s) orally once a day (24 Nov 2022 10:35)  Vitamin D3 1000 intl units oral tablet: 1 tab(s) orally once a day (24 Nov 2022 10:35)      MEDICATIONS  (STANDING):  allopurinol 200 milliGRAM(s) Oral daily  aspirin enteric coated 81 milliGRAM(s) Oral daily  atorvastatin 20 milliGRAM(s) Oral at bedtime  cefuroxime  IVPB 1500 milliGRAM(s) IV Intermittent every 8 hours  finasteride 5 milliGRAM(s) Oral daily  tamsulosin 0.4 milliGRAM(s) Oral at bedtime      TELEMETRY: 	    ECG:  	  RADIOLOGY:   DIAGNOSTIC TESTING:  [ ] Echocardiogram:  [ ] Catheterization:  [ ] Stress Test:    OTHER: 	    LABS:	 	    CARDIAC MARKERS:                  12-04    136  |  97  |  51<H>  ----------------------------<  132<H>  4.6   |  26  |  1.42<H>    Ca    9.6      04 Dec 2022 05:54  Mg     2.6     12-04      proBNP:     Lipid Profile:   HgA1c:     Creatinine, Serum: 1.42 mg/dL (12-04-22 @ 05:54)  Creatinine, Serum: 1.43 mg/dL (12-03-22 @ 07:49)        Assessment and Plan:   · Assessment	  66 y/o male with hx of ascending aortic aneurysm repair, DVT ( two sepearte occasions ) was on Eliquis ( last used 6 months ago ) , sp  AVR ( bovine ) ( repaired twice ?),  s/p PPM (medtronic), HFpEF, AVF of right femoral artery, HTN, BPH, admitted with acute onset dyspnea    #acute on chronic DCHF, pulmonary edema  -recurrent HF, pulmonary edema likely d/t  severe paravalvular AI and moderate MR as seen on JULIANO  -was doing fine for a few days but had 1/2 can of sardines/+++salt intake few hours before onset of dyspnea  -previous CTA chest No pulmonary embolism  -no ACS  -RECENT echo with nl LV sys fx, nl fxn bio avr   -recent cath with luminal LCx/Ramus disease.  Mild proximal LAD disease.   -recent RHC with severely elevated PASP in setting of markedly elevated filling pressures/wedge pressure. Severely elevated PASP likely secondary to increased LA pressure  + RVP recently   -recent bubble study ok  -Pt on 2L NC right now, continue supplemental O2 as necessary  -PPM interrogation 11/28 - noted w/ normal pacemaker fxn  -JULIANO w/ with severe paravalvular AI and moderate MR   -Appreciate CT sx consult  -s/p Toshia TAVR today 12/5  -cont diuretics per cts  -cont med tx of MR  -poss eventual perc mvr as outpatient if MR still significant  -cont toprol 50 mg daily    #Aortic Insufficiency/Moderate MR  -JULIANO results as mentioned above  -Plan for Toshia TAVR on Monday 12/5    #RLE pseudoaneurysm/AV fistula   -during exam before attempting right femoral vein access for RHC --> incidental finding of bounding/pulsatile thrill  -Right fem artery was NOT accessed during cath   -Right LE Arterial doppler revealed  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 Data within normal pacemaking function. Pt with known afib  -resume a/c per cts    #Hx dvt  -recent le doppler neg for dvt     #SP AVR   -stable on echo  -JULIANO w/ with severe paravalvular AI and moderate MR  -s/p TOSHIA TAVR     #Sp PPM - Medtronic   -Recent ppm interrogation as mentioned above      35 minutes spent on total encounter; more than 50% of the visit was spent counseling and/or coordinating care by the attending physician.

## 2022-12-05 NOTE — PRE-ANESTHESIA EVALUATION ADULT - NSANTHOSAYNRD_GEN_A_CORE
No. CHARLI screening performed.  STOP BANG Legend: 0-2 = LOW Risk; 3-4 = INTERMEDIATE Risk; 5-8 = HIGH Risk
No. CHARLI screening performed.  STOP BANG Legend: 0-2 = LOW Risk; 3-4 = INTERMEDIATE Risk; 5-8 = HIGH Risk

## 2022-12-05 NOTE — PROGRESS NOTE ADULT - SUBJECTIVE AND OBJECTIVE BOX
Date of service: 12-05-22 @ 21:59      Patient is a 67y old  Male who presents with a chief complaint of Acute heart failure (06 Dec 2022 00:49)                                                               INTERVAL HPI/OVERNIGHT EVENTS:    REVIEW OF SYSTEMS:     CONSTITUTIONAL: No weakness, fevers or chills  EYES/ENT: No visual changes , no ear ache   NECK: No pain or stiffness  RESPIRATORY: No cough, wheezing,  No shortness of breath  CARDIOVASCULAR: No chest pain or palpitations  GASTROINTESTINAL: No abdominal pain  . No nausea, vomiting, or hematemesis; No diarrhea or constipation. No melena or hematochezia.  GENITOURINARY: No dysuria, frequency or hematuria  NEUROLOGICAL: No numbness or weakness  SKIN: No itching, burning, rashes, or lesions                                                                                                                                                                                                                                                                                 Medications:  MEDICATIONS  (STANDING):  allopurinol 200 milliGRAM(s) Oral daily  aspirin enteric coated 81 milliGRAM(s) Oral daily  atorvastatin 20 milliGRAM(s) Oral at bedtime  finasteride 5 milliGRAM(s) Oral daily  tamsulosin 0.4 milliGRAM(s) Oral at bedtime    MEDICATIONS  (PRN):       Allergies    No Known Allergies    Intolerances      Vital Signs Last 24 Hrs  T(C): 37 (05 Dec 2022 18:49), Max: 37 (05 Dec 2022 18:49)  T(F): 98.6 (05 Dec 2022 18:49), Max: 98.6 (05 Dec 2022 18:49)  HR: 61 (05 Dec 2022 18:49) (59 - 64)  BP: 112/67 (05 Dec 2022 18:49) (108/62 - 129/60)  BP(mean): 79 (05 Dec 2022 08:10) (79 - 79)  RR: 18 (05 Dec 2022 18:49) (10 - 22)  SpO2: 93% (05 Dec 2022 18:49) (88% - 99%)    Parameters below as of 05 Dec 2022 18:49  Patient On (Oxygen Delivery Method): nasal cannula      CAPILLARY BLOOD GLUCOSE          12-04 @ 07:01  -  12-05 @ 07:00  --------------------------------------------------------  IN: 720 mL / OUT: 550 mL / NET: 170 mL    12-05 @ 07:01  -  12-06 @ 00:59  --------------------------------------------------------  IN: 530 mL / OUT: 575 mL / NET: -45 mL      Physical Exam:    Daily Height in cm: 182.88 (05 Dec 2022 08:10)    Daily   General:  Well appearing, NAD, not cachetic  HEENT:  Nonicteric, PERRLA  CV:  RRR, S1S2   Lungs:  CTA B/L, no wheezes, rales, rhonchi  Abdomen:  Soft, non-tender, no distended, positive BS  Extremities:  2+ pulses, no c/c, no edema  Skin:  Warm and dry, no rashes  :  No crystal  Neuro:  AAOx3, non-focal, grossly intact                                                                                                                                                                                                                                                                                                LABS:                            12-04    136  |  97  |  51<H>  ----------------------------<  132<H>  4.6   |  26  |  1.42<H>    Ca    9.6      04 Dec 2022 05:54  Mg     2.6     12-04                         RADIOLOGY & ADDITIONAL TESTS         I personally reviewed: [  ]EKG   [  ]CXR    [  ] CT      A/P:         Discussed with :     Aarti consultants' Notes   Time spent :

## 2022-12-06 LAB
ALBUMIN SERPL ELPH-MCNC: 3.4 G/DL — SIGNIFICANT CHANGE UP (ref 3.3–5)
ALP SERPL-CCNC: 60 U/L — SIGNIFICANT CHANGE UP (ref 40–120)
ALT FLD-CCNC: 40 U/L — SIGNIFICANT CHANGE UP (ref 10–45)
ANION GAP SERPL CALC-SCNC: 10 MMOL/L — SIGNIFICANT CHANGE UP (ref 5–17)
AST SERPL-CCNC: 18 U/L — SIGNIFICANT CHANGE UP (ref 10–40)
BASOPHILS # BLD AUTO: 0 K/UL — SIGNIFICANT CHANGE UP (ref 0–0.2)
BASOPHILS NFR BLD AUTO: 0 % — SIGNIFICANT CHANGE UP (ref 0–2)
BILIRUB SERPL-MCNC: 1.2 MG/DL — SIGNIFICANT CHANGE UP (ref 0.2–1.2)
BUN SERPL-MCNC: 34 MG/DL — HIGH (ref 7–23)
CALCIUM SERPL-MCNC: 8.9 MG/DL — SIGNIFICANT CHANGE UP (ref 8.4–10.5)
CHLORIDE SERPL-SCNC: 100 MMOL/L — SIGNIFICANT CHANGE UP (ref 96–108)
CO2 SERPL-SCNC: 30 MMOL/L — SIGNIFICANT CHANGE UP (ref 22–31)
CREAT SERPL-MCNC: 1.21 MG/DL — SIGNIFICANT CHANGE UP (ref 0.5–1.3)
EGFR: 65 ML/MIN/1.73M2 — SIGNIFICANT CHANGE UP
EOSINOPHIL # BLD AUTO: 0.03 K/UL — SIGNIFICANT CHANGE UP (ref 0–0.5)
EOSINOPHIL NFR BLD AUTO: 0.4 % — SIGNIFICANT CHANGE UP (ref 0–6)
GLUCOSE SERPL-MCNC: 111 MG/DL — HIGH (ref 70–99)
HCT VFR BLD CALC: 30.2 % — LOW (ref 39–50)
HGB BLD-MCNC: 9.8 G/DL — LOW (ref 13–17)
IMM GRANULOCYTES NFR BLD AUTO: 0.4 % — SIGNIFICANT CHANGE UP (ref 0–0.9)
LYMPHOCYTES # BLD AUTO: 0.83 K/UL — LOW (ref 1–3.3)
LYMPHOCYTES # BLD AUTO: 12.2 % — LOW (ref 13–44)
MCHC RBC-ENTMCNC: 31.7 PG — SIGNIFICANT CHANGE UP (ref 27–34)
MCHC RBC-ENTMCNC: 32.5 GM/DL — SIGNIFICANT CHANGE UP (ref 32–36)
MCV RBC AUTO: 97.7 FL — SIGNIFICANT CHANGE UP (ref 80–100)
MONOCYTES # BLD AUTO: 0.65 K/UL — SIGNIFICANT CHANGE UP (ref 0–0.9)
MONOCYTES NFR BLD AUTO: 9.5 % — SIGNIFICANT CHANGE UP (ref 2–14)
NEUTROPHILS # BLD AUTO: 5.29 K/UL — SIGNIFICANT CHANGE UP (ref 1.8–7.4)
NEUTROPHILS NFR BLD AUTO: 77.5 % — HIGH (ref 43–77)
NRBC # BLD: 0 /100 WBCS — SIGNIFICANT CHANGE UP (ref 0–0)
PLATELET # BLD AUTO: 118 K/UL — LOW (ref 150–400)
POTASSIUM SERPL-MCNC: 4 MMOL/L — SIGNIFICANT CHANGE UP (ref 3.5–5.3)
POTASSIUM SERPL-SCNC: 4 MMOL/L — SIGNIFICANT CHANGE UP (ref 3.5–5.3)
PROT SERPL-MCNC: 6.2 G/DL — SIGNIFICANT CHANGE UP (ref 6–8.3)
RBC # BLD: 3.09 M/UL — LOW (ref 4.2–5.8)
RBC # FLD: 14.6 % — HIGH (ref 10.3–14.5)
SODIUM SERPL-SCNC: 140 MMOL/L — SIGNIFICANT CHANGE UP (ref 135–145)
WBC # BLD: 6.83 K/UL — SIGNIFICANT CHANGE UP (ref 3.8–10.5)
WBC # FLD AUTO: 6.83 K/UL — SIGNIFICANT CHANGE UP (ref 3.8–10.5)

## 2022-12-06 PROCEDURE — 93306 TTE W/DOPPLER COMPLETE: CPT | Mod: 26

## 2022-12-06 PROCEDURE — 93010 ELECTROCARDIOGRAM REPORT: CPT

## 2022-12-06 PROCEDURE — 99232 SBSQ HOSP IP/OBS MODERATE 35: CPT

## 2022-12-06 RX ORDER — BUMETANIDE 0.25 MG/ML
1 INJECTION INTRAMUSCULAR; INTRAVENOUS DAILY
Refills: 0 | Status: DISCONTINUED | OUTPATIENT
Start: 2022-12-06 | End: 2022-12-07

## 2022-12-06 RX ADMIN — ATORVASTATIN CALCIUM 20 MILLIGRAM(S): 80 TABLET, FILM COATED ORAL at 22:46

## 2022-12-06 RX ADMIN — FINASTERIDE 5 MILLIGRAM(S): 5 TABLET, FILM COATED ORAL at 11:27

## 2022-12-06 RX ADMIN — Medication 81 MILLIGRAM(S): at 11:27

## 2022-12-06 RX ADMIN — TAMSULOSIN HYDROCHLORIDE 0.4 MILLIGRAM(S): 0.4 CAPSULE ORAL at 22:46

## 2022-12-06 RX ADMIN — Medication 200 MILLIGRAM(S): at 11:28

## 2022-12-06 RX ADMIN — BUMETANIDE 1 MILLIGRAM(S): 0.25 INJECTION INTRAMUSCULAR; INTRAVENOUS at 09:06

## 2022-12-06 NOTE — PROGRESS NOTE ADULT - SUBJECTIVE AND OBJECTIVE BOX
Date of service: 22 @ 16:15      Patient is a 67y old  Male who presents with a chief complaint of Acute heart failure (06 Dec 2022 13:05)                                                               INTERVAL HPI/OVERNIGHT EVENTS:    REVIEW OF SYSTEMS:     CONSTITUTIONAL: No weakness, fevers or chills  EYES/ENT: No visual changes , no ear ache   NECK: No pain or stiffness  RESPIRATORY: No cough, wheezing,  No shortness of breath  CARDIOVASCULAR: No chest pain or palpitations  GASTROINTESTINAL: No abdominal pain  . No nausea, vomiting, or hematemesis; No diarrhea or constipation. No melena or hematochezia.  GENITOURINARY: No dysuria, frequency or hematuria  NEUROLOGICAL: No numbness or weakness  SKIN: No itching, burning, rashes, or lesions                                                                                                                                                                                                                                                                                 Medications:  MEDICATIONS  (STANDING):  allopurinol 200 milliGRAM(s) Oral daily  aspirin enteric coated 81 milliGRAM(s) Oral daily  atorvastatin 20 milliGRAM(s) Oral at bedtime  buMETAnide 1 milliGRAM(s) Oral daily  finasteride 5 milliGRAM(s) Oral daily  tamsulosin 0.4 milliGRAM(s) Oral at bedtime    MEDICATIONS  (PRN):       Allergies    No Known Allergies    Intolerances      Vital Signs Last 24 Hrs  T(C): 36.5 (06 Dec 2022 12:24), Max: 37 (05 Dec 2022 18:49)  T(F): 97.7 (06 Dec 2022 12:24), Max: 98.6 (05 Dec 2022 18:49)  HR: 60 (06 Dec 2022 12:24) (60 - 62)  BP: 118/71 (06 Dec 2022 12:24) (112/67 - 124/69)  BP(mean): 87 (06 Dec 2022 03:56) (87 - 87)  RR: 18 (06 Dec 2022 13:21) (18 - 18)  SpO2: 93% (06 Dec 2022 13:21) (89% - 93%)    Parameters below as of 06 Dec 2022 13:21  Patient On (Oxygen Delivery Method): room air      CAPILLARY BLOOD GLUCOSE           @ 07:01  -   @ 07:00  --------------------------------------------------------  IN: 820 mL / OUT: 1575 mL / NET: -755 mL     @ 07:01  -   @ 16:15  --------------------------------------------------------  IN: 520 mL / OUT: 400 mL / NET: 120 mL      Physical Exam:    Daily     Daily Weight in k.6 (06 Dec 2022 07:00)  General:  Well appearing, NAD, not cachetic  HEENT:  Nonicteric, PERRLA  CV:  RRR, S1S2   Lungs:  CTA B/L, no wheezes, rales, rhonchi  Abdomen:  Soft, non-tender, no distended, positive BS  Extremities: edema   Neuro:  AAOx3, non-focal, grossly intact                                                                                                                                                                                                                                                                                                LABS:                               9.8    6.83  )-----------( 118      ( 06 Dec 2022 06:19 )             30.2                          140  |  100  |  34<H>  ----------------------------<  111<H>  4.0   |  30  |  1.21    Ca    8.9      06 Dec 2022 06:18    TPro  6.2  /  Alb  3.4  /  TBili  1.2  /  DBili  x   /  AST  18  /  ALT  40  /  AlkPhos  60                         RADIOLOGY & ADDITIONAL TESTS         I personally reviewed: [  ]EKG   [  ]CXR    [  ] CT      A/P:         Discussed with :     Aarti consultants' Notes   Time spent :

## 2022-12-06 NOTE — PROGRESS NOTE ADULT - PROBLEM SELECTOR PLAN 6
As per chart review.   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
As per chart review.   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
As per chart review.   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
As per chart review.   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
As per chart review.   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
As per chart review.   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
As per chart review.   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
As per chart review.   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

## 2022-12-06 NOTE — PROGRESS NOTE ADULT - PROBLEM SELECTOR PLAN 10
- DVT prophylaxis - on xarelto   - Diet - DASH/TLC  - Goals of care- He states his emergency contact is brother in law Lon. He filled out a form in the past. He would want resuscitation if his heart stopped. , but does not want to be left connected to a machine.
- DVT prophylaxis - on Xarelto   - Diet - DASH/TLC  - Goals of care- He states his emergency contact is brother in law Lon. He filled out a form in the past. He would want resuscitation if his heart stopped. , but does not want to be left connected to a machine.  - Full Code
- DVT prophylaxis - on xarelto   - Diet - DASH/TLC  - Goals of care- He states his emergency contact is brother in law Lon. He filled out a form in the past. He would want resuscitation if his heart stopped. , but does not want to be left connected to a machine.
- DVT prophylaxis - on Xarelto   - Diet - DASH/TLC  - Goals of care- He states his emergency contact is brother in law Lon. He filled out a form in the past. He would want resuscitation if his heart stopped. , but does not want to be left connected to a machine.  - Full Code

## 2022-12-06 NOTE — PROGRESS NOTE ADULT - PROBLEM SELECTOR PLAN 8
c/w metoprolol and anticoagulation

## 2022-12-06 NOTE — PROGRESS NOTE ADULT - PROBLEM SELECTOR PLAN 9
- Likely from heart failure.   - Monitoring on bmp  - Avoid nephrotoxic agents

## 2022-12-06 NOTE — PROGRESS NOTE ADULT - ASSESSMENT
Patient is a 66 yo Man with PMHx of HTN, Gout, HLD, Mild CAD, diastolic CHF, AAA s/p repair, DVT x 2 separate episodes (s/p Eliquis), s/p AVR (Bovine), s/p medtronic PPM,  BPH, hx of paroxysmal atrial fibrillation on Xarelto, possible AV fistula in his Right groin who presents for shortness of breath. Found with acute decompensated diastolic heart failure. 
Patient is a 66 yo Man with PMHx of HTN, Gout, HLD, Mild CAD, diastolic CHF, AAA s/p repair, DVT x 2 separate episodes (s/p Eliquis), s/p AVR (Bovine), s/p medtronic PPM,  BPH, hx of paroxysmal atrial fibrillation on Xarelto, possible AV fistula in his Right groin who presents for shortness of breath. Found with acute decompensated diastolic heart failure.     Problem/Plan - 1:  ·  Problem: Acute on chronic diastolic congestive heart failure.   ·  Plan: - Shortness of breath, mild leg edema, and elevated pro-bnp. s/p furosemide in the ED.   - Bumex --> switched to IV per card  - History reviewed as below, past stress test abnl with evidence of infarct and shilo-infarct ischemia, LVEF 59%;   revealing hypokinesis of the basal inferior and basal septal walls and reduced systolic thickening of the basal to mid inferolateral, distal anterior, distal anteroseptal, and apical walls  -s/p recent cath with luminal LCx/Ramus disease.  Mild proximal LAD disease.   - Severely elevated PASP in setting of markedly elevated filling pressures/wedge pressure. Severely elevated PASP likely secondary to increased LA pressure.    - cont diruesis  Bumex. and additional bumex prn sob: monitor CR   - fu with CTS given paravalvular leaky AI and mod MR: Preop Redo AVR : now s/p ISRAEL    AC per CTS.    Problem/Plan - 2:  ·  Problem: Leukocytosis.   ·  Plan: - Appreciated on admission CBC.   - Denied burning on urination, sore throat and productive cough.   - Chest xray with vascular congestion.   - repeat CBC normalized. likely reactive.   - monitor off abx.    Problem/Plan - 3:  ·  Problem: History of BPH.   ·  Plan: - c/w finasteride and tamsulosin.    Problem/Plan - 4:  ·  Problem: Hypertension.   ·  Plan: c/w metoprolol and Bumex as above.    Problem/Plan - 5:  ·  Problem: Transaminitis.   ·  Plan: - Possible from hepatic congestion from heart failure.   - Hepatitis C negative.   - No hx of ETOH use.   - monitor.    Problem/Plan - 6:  ·  Problem: AV fistula.   ·  Plan: As per chart review.   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    Problem/Plan - 7:  ·  Problem: Prolonged QT interval.   ·  Plan: - Appreciated on admission ecg. Improved on telemetry.   - Restarting metoprolol. Electrolyes within normal limits.   - Cardiology consult   - Avoid QT prolonging medications.    Problem/Plan - 8:  ·  Problem: Atrial fibrillation.   ·  Plan: c/w metoprolol and anticoagulation.    Problem/Plan - 9:  ·  Problem: MARIO (acute kidney injury).   ·  Plan: - Likely from heart failure.   - Monitoring on bmp  - Avoid nephrotoxic agents.    Problem/Plan - 10:  ·  Problem: Need for prophylactic measure.   ·  Plan; - DVT prophylaxis - on Xarelto   - Diet - DASH/TLC  - Goals of care- He states his emergency contact is brother in law Lon. He filled out a form in the past. He would want resuscitation if his heart stopped. , but does not want to be left connected to a machine.  - Full Code.
Patient is a 66 yo Man with PMHx of HTN, Gout, HLD, Mild CAD, diastolic CHF, AAA s/p repair, DVT x 2 separate episodes (s/p Eliquis), s/p AVR (Bovine), s/p medtronic PPM,  BPH, hx of paroxysmal atrial fibrillation on Xarelto, possible AV fistula in his Right groin who presents for shortness of breath. Found with acute decompensated diastolic heart failure.     Problem/Plan - 1:  ·  Problem: Acute on chronic diastolic congestive heart failure.   ·  Plan: - Shortness of breath, mild leg edema, and elevated pro-bnp. s/p furosemide in the ED.   - Bumex --> switched to IV per card  - History reviewed as below, past stress test abnl with evidence of infarct and shilo-infarct ischemia, LVEF 59%;   revealing hypokinesis of the basal inferior and basal septal walls and reduced systolic thickening of the basal to mid inferolateral, distal anterior, distal anteroseptal, and apical walls  -s/p recent cath with luminal LCx/Ramus disease.  Mild proximal LAD disease.   - Severely elevated PASP in setting of markedly elevated filling pressures/wedge pressure. Severely elevated PASP likely secondary to increased LA pressure.    - cont diruesis  Bumex. and additional bumex prn sob: monitor CR   - fu with CTS given paravalvular leaky AI and mod MR: Preop Redo AVR : now s/p TAVR ...    AC per CTS.  fu as op re MR     Problem/Plan - 2:  ·  Problem: Leukocytosis.   ·  Plan: - Appreciated on admission CBC.   - Denied burning on urination, sore throat and productive cough.   - Chest xray with vascular congestion.   - repeat CBC normalized. likely reactive.   - monitor off abx.    Problem/Plan - 3:  ·  Problem: History of BPH.   ·  Plan: - c/w finasteride and tamsulosin.    Problem/Plan - 4:  ·  Problem: Hypertension.   ·  Plan: c/w metoprolol and Bumex as above.    Problem/Plan - 5:  ·  Problem: Transaminitis.   ·  Plan: - Possible from hepatic congestion from heart failure.   - Hepatitis C negative.   - No hx of ETOH use.   - monitor.    Problem/Plan - 6:  ·  Problem: AV fistula.   ·  Plan: As per chart review.   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    Problem/Plan - 7:  ·  Problem: Prolonged QT interval.   ·  Plan: - Appreciated on admission ecg. Improved on telemetry.   - Restarting metoprolol. Electrolyes within normal limits.   - Cardiology consult   - Avoid QT prolonging medications.    Problem/Plan - 8:  ·  Problem: Atrial fibrillation.   ·  Plan: c/w metoprolol and anticoagulation.    Problem/Plan - 9:  ·  Problem: MARIO (acute kidney injury).   ·  Plan: - Likely from heart failure.   - Monitoring on bmp  - Avoid nephrotoxic agents.    Problem/Plan - 10:  ·  Problem: Need for prophylactic measure.   ·  Plan; - DVT prophylaxis - on Xarelto   - Diet - DASH/TLC  - Goals of care- He states his emergency contact is brother in law Lon. He filled out a form in the past. He would want resuscitation if his heart stopped. , but does not want to be left connected to a machine.  - Full Code.
68 y/o male with hx of ascending aortic aneurysm repair, DVT ( two sepearte occasions ) was on Eliquis ( last used 6 months ago ) , sp  AVR ( bovine ) ( repaired twice ?),  s/p PPM (medtronic), HFpEF, AVF of right femoral artery, HTN, BPH, admitted with acute onset dyspnea    #acute on chronic DCHF, pulmonary edema  -recurrent HF, pulmonary edema likely d/t  severe paravalvular AI and moderate MR as seen on JULIANO  -was doing fine for a few days but had 1/2 can of sardines/+++salt intake few hours before onset of dyspnea  -previous CTA chest No pulmonary embolism  -no ACS  -RECENT echo with nl LV sys fx, nl fxn bio avr   -recent cath with luminal LCx/Ramus disease.  Mild proximal LAD disease.   -recent RHC with severely elevated PASP in setting of markedly elevated filling pressures/wedge pressure. Severely elevated PASP likely secondary to increased LA pressure  + RVP recently   -recent bubble study ok  -Pt on 2L NC right now, continue supplemental O2 as necessary  -PPM interrogation 11/28 - noted w/ normal pacemaker fxn  -JULIANO w/ with severe paravalvular AI and moderate MR   -Appreciate CT sx consult  -Plan for Shubham TAVR on Monday 12/5  -cont iv bumex as ordered bid for now  -cont toprol 50 mg daily    #Aortic Insufficiency/Moderate MR  -JULIANO results as mentioned above  -Plan for redo AVR and Poss MVR on Monday 12/5    #RLE pseudoaneurysm/AV fistula   -during exam before attempting right femoral vein access for RHC --> incidental finding of bounding/pulsatile thrill  -Right fem artery was NOT accessed during cath   -Right LE Arterial doppler revealed  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 Data within normal pacemaking function. Pt with known afib  -cont eliquis    #Hx dvt  -recent le doppler neg for dvt     #SP AVR   -stable on echo  -JULIANO w/ with severe paravalvular AI and moderate MR    #Sp PPM - Medtronic   -Recent ppm interrogation as mentioned above      35 minutes spent on total encounter; more than 50% of the visit was spent counseling and/or coordinating care by the attending physician.    
66 y/o male with hx of ascending aortic aneurysm repair, DVT ( two sepearte occasions ) was on Eliquis ( last used 6 months ago ) , sp  AVR ( bovine ) ( repaired twice ?),  s/p PPM (medtronic), HFpEF, AVF of right femoral artery, HTN, BPH, admitted with acute onset dyspnea.   JULIANO done showing Mod MR and Mod/Sev AV PVL.     11/29 CT surgery consulted. Preop for Redo AVR and Poss MVR on Monday
Patient is a 66 yo Man with PMHx of HTN, Gout, HLD, Mild CAD, diastolic CHF, AAA s/p repair, DVT x 2 separate episodes (s/p Eliquis), s/p AVR (Bovine), s/p medtronic PPM,  BPH, hx of paroxysmal atrial fibrillation on Xarelto, possible AV fistula in his Right groin who presents for shortness of breath. Found with acute decompensated diastolic heart failure. 
Patient is a 66 yo Man with PMHx of HTN, Gout, HLD, Mild CAD, diastolic CHF, AAA s/p repair, DVT x 2 separate episodes (s/p Eliquis), s/p AVR (Bovine), s/p medtronic PPM,  BPH, hx of paroxysmal atrial fibrillation on Xarelto, possible AV fistula in his Right groin who presents for shortness of breath. Found with acute decompensated diastolic heart failure. 
Patient is a 68 yo Man with PMHx of HTN, Gout, HLD, Mild CAD, diastolic CHF, AAA s/p repair, DVT x 2 separate episodes (s/p Eliquis), s/p AVR (Bovine), s/p medtronic PPM,  BPH, hx of paroxysmal atrial fibrillation on Xarelto, possible AV fistula in his Right groin who presents for shortness of breath. Found with acute decompensated diastolic heart failure. 
Patient is a 66 yo Man with PMHx of HTN, Gout, HLD, Mild CAD, diastolic CHF, AAA s/p repair, DVT x 2 separate episodes (s/p Eliquis), s/p AVR (Bovine), s/p medtronic PPM,  BPH, hx of paroxysmal atrial fibrillation on Xarelto, possible AV fistula in his Right groin who presents for shortness of breath. Found with acute decompensated diastolic heart failure. 
Patient is a 68 yo Man with PMHx of HTN, Gout, HLD, Mild CAD, diastolic CHF, AAA s/p repair, DVT x 2 separate episodes (s/p Eliquis), s/p AVR (Bovine), s/p medtronic PPM,  BPH, hx of paroxysmal atrial fibrillation on Xarelto, possible AV fistula in his Right groin who presents for shortness of breath. Found with acute decompensated diastolic heart failure. 
Patient is a 66 yo Man with PMHx of HTN, Gout, HLD, Mild CAD, diastolic CHF, AAA s/p repair, DVT x 2 separate episodes (s/p Eliquis), s/p AVR (Bovine), s/p medtronic PPM,  BPH, hx of paroxysmal atrial fibrillation on Xarelto, possible AV fistula in his Right groin who presents for shortness of breath. Found with acute decompensated diastolic heart failure.

## 2022-12-06 NOTE — PROGRESS NOTE ADULT - PROBLEM SELECTOR PLAN 2
- Appreciated on admission CBC.   - Denied burning on urination, sore throat and productive cough.   - Chest xray with vascular congestion.   - repeat CBC normalized. likely reactive.   - monitor off abx

## 2022-12-06 NOTE — PROGRESS NOTE ADULT - PROBLEM SELECTOR PROBLEM 8
Atrial fibrillation

## 2022-12-06 NOTE — PROGRESS NOTE ADULT - SUBJECTIVE AND OBJECTIVE BOX
Date of service: 12-06-22 @ 00:50      Patient is a 67y old  Male who presents with a chief complaint of Acute heart failure (05 Dec 2022 16:02)                                                               INTERVAL HPI/OVERNIGHT EVENTS:    REVIEW OF SYSTEMS:     CONSTITUTIONAL: No weakness, fevers or chills  EYES/ENT: No visual changes , no ear ache   NECK: No pain or stiffness  RESPIRATORY: No cough, wheezing,  No shortness of breath  CARDIOVASCULAR: No chest pain or palpitations  GASTROINTESTINAL: No abdominal pain  . No nausea, vomiting, or hematemesis; No diarrhea or constipation. No melena or hematochezia.  GENITOURINARY: No dysuria, frequency or hematuria  NEUROLOGICAL: No numbness or weakness  SKIN: No itching, burning, rashes, or lesions                                                                                                                                                                                                                                                                                 Medications:  MEDICATIONS  (STANDING):  allopurinol 200 milliGRAM(s) Oral daily  aspirin enteric coated 81 milliGRAM(s) Oral daily  atorvastatin 20 milliGRAM(s) Oral at bedtime  finasteride 5 milliGRAM(s) Oral daily  tamsulosin 0.4 milliGRAM(s) Oral at bedtime    MEDICATIONS  (PRN):       Allergies    No Known Allergies    Intolerances      Vital Signs Last 24 Hrs  T(C): 37 (05 Dec 2022 18:49), Max: 37 (05 Dec 2022 18:49)  T(F): 98.6 (05 Dec 2022 18:49), Max: 98.6 (05 Dec 2022 18:49)  HR: 61 (05 Dec 2022 18:49) (59 - 64)  BP: 112/67 (05 Dec 2022 18:49) (108/62 - 129/60)  BP(mean): 79 (05 Dec 2022 08:10) (79 - 79)  RR: 18 (05 Dec 2022 18:49) (10 - 22)  SpO2: 93% (05 Dec 2022 18:49) (88% - 99%)    Parameters below as of 05 Dec 2022 18:49  Patient On (Oxygen Delivery Method): nasal cannula      CAPILLARY BLOOD GLUCOSE          12-04 @ 07:01  -  12-05 @ 07:00  --------------------------------------------------------  IN: 720 mL / OUT: 550 mL / NET: 170 mL    12-05 @ 07:01  -  12-06 @ 00:50  --------------------------------------------------------  IN: 530 mL / OUT: 575 mL / NET: -45 mL      Physical Exam:    Daily Height in cm: 182.88 (05 Dec 2022 08:10)    Daily   General:  Well appearing, NAD, not cachetic  HEENT:  Nonicteric, PERRLA  CV:  RRR, S1S2   Lungs:  CTA B/L, no wheezes, rales, rhonchi  Abdomen:  Soft, non-tender, no distended, positive BS  Extremities:  no edema  Neuro:  AAOx3, non-focal, grossly intact                                                                                                                                                                                                                                                                                                LABS:                            12-04    136  |  97  |  51<H>  ----------------------------<  132<H>  4.6   |  26  |  1.42<H>    Ca    9.6      04 Dec 2022 05:54  Mg     2.6     12-04                         RADIOLOGY & ADDITIONAL TESTS         I personally reviewed: [  ]EKG   [  ]CXR    [  ] CT      A/P:         Discussed with :     Aarti consultants' Notes   Time spent :

## 2022-12-06 NOTE — PROGRESS NOTE ADULT - SUBJECTIVE AND OBJECTIVE BOX
VITAL SIGNS    Telemetry:    Vital Signs Last 24 Hrs  T(C): 36.6 (22 @ 03:56), Max: 37 (22 @ 18:49)  T(F): 97.9 (22 @ 03:56), Max: 98.6 (22 @ 18:49)  HR: 62 (22 @ 03:56) (59 - 64)  BP: 124/69 (22 @ 03:56) (112/67 - 129/60)  RR: 18 (22 @ 08:20) (10 - )  SpO2: 92% (22 @ 08:20) (88% - 99%)             07:01  -   @ 07:00  --------------------------------------------------------  IN: 820 mL / OUT: 1575 mL / NET: -755 mL     07:01  -   @ 08:35  --------------------------------------------------------  IN: 420 mL / OUT: 100 mL / NET: 320 mL       Daily     Daily Weight in k.6 (06 Dec 2022 07:00)  Admit Wt: Drug Dosing Weight  Height (cm): 182.9 (05 Dec 2022 08:10)  Weight (kg): 99.8 (05 Dec 2022 08:10)  BMI (kg/m2): 29.8 (05 Dec 2022 08:10)  BSA (m2): 2.22 (05 Dec 2022 08:10)    Bilirubin Total, Serum: 1.2 mg/dL (:18)    CAPILLARY BLOOD GLUCOSE              allopurinol 200 milliGRAM(s) Oral daily  aspirin enteric coated 81 milliGRAM(s) Oral daily  atorvastatin 20 milliGRAM(s) Oral at bedtime  buMETAnide 1 milliGRAM(s) Oral daily  finasteride 5 milliGRAM(s) Oral daily  tamsulosin 0.4 milliGRAM(s) Oral at bedtime      PHYSICAL EXAM    Subjective: "I feel good."   Neurology: alert and oriented x 3, nonfocal, no gross deficits  CV : tele:  afib v. paced 60  Lungs: bibasilar crackles;  RR easy, unlabored   Abdomen: soft, nontender, nondistended, positive bowel sounds, neg bowel movement   Neg N/V/D; obese abdomen   :  pt voiding without difficulty   Extremities:   BARONE; trace LE edema, neg calf tenderness.   PPP bilaterally; +rt groin bounding thrill; left groin cdi w/ dressing          67M of HTN,Gout,HLD, Mild CAD, diastolic CHF, AAA s/p repair, DVT x 2 separate episodes (s/p Eliquis), s/p AVR (Bovine), s/p medtronic PPM,  BPH, hx of paroxysmal atrial fibrillation on xarelto, possible AV fistula in his Right groin who presents for shortness of breath  He was watching tv and he started feeling short of breath. This prompted him and his son to bring him to the ER. He states he has been taking medications as prescribed. He did not miss a dose. He had stopped aspirin, presumably due to mild CAD. He endorses occasional cough, and mild leg swelling. He was previously admitted on 22 for acute CHF c/b entero/rhino virus infection. He went to work on Friday after discharge. And the following Monday went to see his PCP.       Problem #1:  s/p  TAVR- TOSHIA  continue postop care   ekg qd- Paced   ck echo today   monitor bilateral groin sites   asa today  resume xarelto today  resume diuretics     Problem #2:  MR  struct heart following  MARTHA/ TMVR in the future as per DR. Rodriguez       Problem #3:  rt groin psa/avf  vasc following  d/w Dr. Augustin and Dr. Pearson today       discharge planning- home when stable- no mcot - pt has a Medtronic PPM            VITAL SIGNS    Telemetry:  afib V.paced 60  Vital Signs Last 24 Hrs  T(C): 36.6 (22 @ 03:56), Max: 37 (22 @ 18:49)  T(F): 97.9 (22 @ 03:56), Max: 98.6 (22 @ 18:49)  HR: 62 (22 @ 03:56) (59 - 64)  BP: 124/69 (22 @ 03:56) (112/67 - 129/60)  RR: 18 (22 @ 08:20) (10 - )  SpO2: 92% (22 @ 08:20) (88% - 99%)             07:01  -   @ 07:00  --------------------------------------------------------  IN: 820 mL / OUT: 1575 mL / NET: -755 mL     07:01  -   @ 08:35  --------------------------------------------------------  IN: 420 mL / OUT: 100 mL / NET: 320 mL       Daily     Daily Weight in k.6 (06 Dec 2022 07:00)  Admit Wt: Drug Dosing Weight  Height (cm): 182.9 (05 Dec 2022 08:10)  Weight (kg): 99.8 (05 Dec 2022 08:10)  BMI (kg/m2): 29.8 (05 Dec 2022 08:10)  BSA (m2): 2.22 (05 Dec 2022 08:10)    Bilirubin Total, Serum: 1.2 mg/dL (:18)           allopurinol 200 milliGRAM(s) Oral daily  aspirin enteric coated 81 milliGRAM(s) Oral daily  atorvastatin 20 milliGRAM(s) Oral at bedtime  buMETAnide 1 milliGRAM(s) Oral daily  finasteride 5 milliGRAM(s) Oral daily  tamsulosin 0.4 milliGRAM(s) Oral at bedtime      PHYSICAL EXAM    Subjective: "I feel good."   Neurology: alert and oriented x 3, nonfocal, no gross deficits  CV : tele:  afib v. paced 60  Lungs: bibasilar crackles;  RR easy, unlabored   Abdomen: soft, nontender, nondistended, positive bowel sounds, neg bowel movement   Neg N/V/D; obese abdomen   :  pt voiding without difficulty   Extremities:   BARONE; trace LE edema, neg calf tenderness.   PPP bilaterally; +rt groin bounding thrill; left groin cdi w/ dressing          67M of HTN,Gout,HLD, Mild CAD, diastolic CHF, AAA s/p repair, DVT x 2 separate episodes (s/p Eliquis), s/p AVR (Bovine), s/p medtronic PPM,  BPH, hx of paroxysmal atrial fibrillation on xarelto, possible AV fistula in his Right groin who presents for shortness of breath  He was watching tv and he started feeling short of breath. This prompted him and his son to bring him to the ER. He states he has been taking medications as prescribed. He did not miss a dose. He had stopped aspirin, presumably due to mild CAD. He endorses occasional cough, and mild leg swelling. He was previously admitted on 22 for acute CHF c/b entero/rhino virus infection. He went to work on Friday after discharge. And the following Monday went to see his PCP.   s/p / TAVR - unremarkable postop course- tx floor       Problem #1:  s/p / TAVR- TOSHIA  continue postop care   ekg qd- Paced   ck echo today   monitor bilateral groin sites   asa today  resume diuretics today   resume xarelto today if no vasc sx intervention on this admission   resume diuretics     Problem #2:  MR  struct heart following  resume diuretics today   MARTHA/ TMVR in the future as per DR. Rodriguez       Problem #3:  rt groin psa/avf  vasc following  d/w Dr. Augustin and Dr. Pearson today       discharge planning- home when stable- no mcot - pt has a Medtronic PPM

## 2022-12-06 NOTE — PROGRESS NOTE ADULT - PROBLEM SELECTOR PLAN 3
- c/w finasteride and tamsulosin.

## 2022-12-06 NOTE — PROGRESS NOTE ADULT - PROBLEM SELECTOR PROBLEM 9
MARIO (acute kidney injury)
MARIO (acute kidney injury)
AMRIO (acute kidney injury)
MARIO (acute kidney injury)

## 2022-12-06 NOTE — PROGRESS NOTE ADULT - PROBLEM SELECTOR PROBLEM 6
AV fistula

## 2022-12-06 NOTE — PROGRESS NOTE ADULT - SUBJECTIVE AND OBJECTIVE BOX
CARDIOLOGY FOLLOW UP - Dr. Carrera  DATE OF SERVICE: 12/6/22    CC  Pt feels well. He denies CP, SOB, palpitations.    REVIEW OF SYSTEMS:  CONSTITUTIONAL: No fever, weight loss, or fatigue  RESPIRATORY: No cough, wheezing, chills or hemoptysis; No Shortness of Breath  CARDIOVASCULAR: No chest pain, palpitations, passing out, dizziness, or leg swelling  GASTROINTESTINAL: No abdominal or epigastric pain. No nausea, vomiting, or hematemesis; No diarrhea or constipation. No melena or hematochezia.  VASCULAR: No edema     PHYSICAL EXAM:  T(C): 36.6 (12-06-22 @ 03:56), Max: 37 (12-05-22 @ 18:49)  HR: 62 (12-06-22 @ 03:56) (59 - 64)  BP: 124/69 (12-06-22 @ 03:56) (112/67 - 129/60)  RR: 18 (12-06-22 @ 08:20) (10 - 22)  SpO2: 92% (12-06-22 @ 08:20) (88% - 99%)  Wt(kg): --  I&O's Summary    05 Dec 2022 07:01  -  06 Dec 2022 07:00  --------------------------------------------------------  IN: 820 mL / OUT: 1575 mL / NET: -755 mL    06 Dec 2022 07:01  -  06 Dec 2022 09:34  --------------------------------------------------------  IN: 420 mL / OUT: 100 mL / NET: 320 mL        Appearance: Normal	  Cardiovascular: Normal S1 S2,RRR, No JVD, No murmurs  Respiratory: +Minimal crackles RLL, All other lobes CTA  Gastrointestinal:  Soft, Non-tender, + BS	  Extremities: +Mild edema b/l LE      Home Medications:  finasteride 5 mg oral tablet: 1 tab(s) orally once a day (24 Nov 2022 10:35)  tamsulosin 0.4 mg oral capsule: 1 cap(s) orally 2 times a day (24 Nov 2022 10:35)  Vitamin C 1000 mg oral tablet: 1 tab(s) orally once a day (24 Nov 2022 10:35)  Vitamin D3 1000 intl units oral tablet: 1 tab(s) orally once a day (24 Nov 2022 10:35)      MEDICATIONS  (STANDING):  allopurinol 200 milliGRAM(s) Oral daily  aspirin enteric coated 81 milliGRAM(s) Oral daily  atorvastatin 20 milliGRAM(s) Oral at bedtime  buMETAnide 1 milliGRAM(s) Oral daily  finasteride 5 milliGRAM(s) Oral daily  tamsulosin 0.4 milliGRAM(s) Oral at bedtime      TELEMETRY: Vpaced 60s  ECG:  	  RADIOLOGY:   DIAGNOSTIC TESTING:  [ ] Echocardiogram:  [ ]  Catheterization:  [ ] Stress Test:    OTHER: 	    LABS:	 	                            9.8    6.83  )-----------( 118      ( 06 Dec 2022 06:19 )             30.2     12-06    140  |  100  |  34<H>  ----------------------------<  111<H>  4.0   |  30  |  1.21        Assessment and Plan:   · Assessment	  68 y/o male with hx of ascending aortic aneurysm repair, DVT ( two sepearte occasions ) was on Eliquis ( last used 6 months ago ) , sp  AVR ( bovine ) ( repaired twice ?),  s/p PPM (medtronic), HFpEF, AVF of right femoral artery, HTN, BPH, admitted with acute onset dyspnea    #acute on chronic DCHF, pulmonary edema  -recurrent HF, pulmonary edema likely d/t  severe paravalvular AI and moderate MR as seen on JULIANO  -was doing fine for a few days but had 1/2 can of sardines/+++salt intake few hours before onset of dyspnea  -previous CTA chest No pulmonary embolism  -no ACS  -RECENT echo with nl LV sys fx, nl fxn bio avr   -recent cath with luminal LCx/Ramus disease.  Mild proximal LAD disease.   -recent RHC with severely elevated PASP in setting of markedly elevated filling pressures/wedge pressure. Severely elevated PASP likely secondary to increased LA pressure  + RVP recently   -recent bubble study ok  -Pt on 2L NC right now, continue supplemental O2 as necessary  -PPM interrogation 11/28 - noted w/ normal pacemaker fxn  -JULIANO w/ with severe paravalvular AI and moderate MR   -Appreciate CT sx consult  -s/p Shubham TAVR yesterday 12/5  -cont diuretics per cts  -cont med tx of MR  -poss eventual perc mvr as outpatient if MR still significant  -cont toprol 50 mg daily  -Repeat echo today    #Aortic Insufficiency/Moderate MR  -JULIANO results as mentioned above  -S/p Shubham TAVR on 12/5  -Repeat echo today    #RLE pseudoaneurysm/AV fistula   -during exam before attempting right femoral vein access for RHC --> incidental finding of bounding/pulsatile thrill  -Right fem artery was NOT accessed during cath   -Right LE Arterial doppler revealed  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 Data within normal pacemaking function. Pt with known afib  -resume a/c per cts    #Hx dvt  -recent le doppler neg for dvt     #SP AVR   -stable on echo  -JULIANO w/ with severe paravalvular AI and moderate MR  -s/p SHUBHAM TAVR   -Repeat echo today    #Sp PPM - Medtronic   -Recent ppm interrogation as mentioned above      Ca    8.9      06 Dec 2022 06:18    TPro  6.2  /  Alb  3.4  /  TBili  1.2  /  DBili  x   /  AST  18  /  ALT  40  /  AlkPhos  60  12-06             CARDIOLOGY FOLLOW UP - Dr. Carrera  DATE OF SERVICE: 12/6/22    CC  Pt feels well. He denies CP, SOB, palpitations.    REVIEW OF SYSTEMS:  CONSTITUTIONAL: No fever, weight loss, or fatigue  RESPIRATORY: No cough, wheezing, chills or hemoptysis; No Shortness of Breath  CARDIOVASCULAR: No chest pain, palpitations, passing out, dizziness, or leg swelling  GASTROINTESTINAL: No abdominal or epigastric pain. No nausea, vomiting, or hematemesis; No diarrhea or constipation. No melena or hematochezia.  VASCULAR: No edema     PHYSICAL EXAM:  T(C): 36.6 (12-06-22 @ 03:56), Max: 37 (12-05-22 @ 18:49)  HR: 62 (12-06-22 @ 03:56) (59 - 64)  BP: 124/69 (12-06-22 @ 03:56) (112/67 - 129/60)  RR: 18 (12-06-22 @ 08:20) (10 - 22)  SpO2: 92% (12-06-22 @ 08:20) (88% - 99%)  Wt(kg): --  I&O's Summary    05 Dec 2022 07:01  -  06 Dec 2022 07:00  --------------------------------------------------------  IN: 820 mL / OUT: 1575 mL / NET: -755 mL    06 Dec 2022 07:01  -  06 Dec 2022 09:34  --------------------------------------------------------  IN: 420 mL / OUT: 100 mL / NET: 320 mL        Appearance: Normal	  Cardiovascular: Normal S1 S2,RRR, No JVD, No murmurs  Respiratory: +Minimal crackles RLL, All other lobes CTA  Gastrointestinal:  Soft, Non-tender, + BS	  Extremities: +Mild edema b/l LE      Home Medications:  finasteride 5 mg oral tablet: 1 tab(s) orally once a day (24 Nov 2022 10:35)  tamsulosin 0.4 mg oral capsule: 1 cap(s) orally 2 times a day (24 Nov 2022 10:35)  Vitamin C 1000 mg oral tablet: 1 tab(s) orally once a day (24 Nov 2022 10:35)  Vitamin D3 1000 intl units oral tablet: 1 tab(s) orally once a day (24 Nov 2022 10:35)      MEDICATIONS  (STANDING):  allopurinol 200 milliGRAM(s) Oral daily  aspirin enteric coated 81 milliGRAM(s) Oral daily  atorvastatin 20 milliGRAM(s) Oral at bedtime  buMETAnide 1 milliGRAM(s) Oral daily  finasteride 5 milliGRAM(s) Oral daily  tamsulosin 0.4 milliGRAM(s) Oral at bedtime      TELEMETRY: Vpaced 60s  ECG:  	  RADIOLOGY:   DIAGNOSTIC TESTING:  [ ] Echocardiogram:  [ ]  Catheterization:  [ ] Stress Test:    OTHER: 	    LABS:	 	                            9.8    6.83  )-----------( 118      ( 06 Dec 2022 06:19 )             30.2     12-06    140  |  100  |  34<H>  ----------------------------<  111<H>  4.0   |  30  |  1.21        Assessment and Plan:   · Assessment	  68 y/o male with hx of ascending aortic aneurysm repair, DVT ( two sepearte occasions ) was on Eliquis ( last used 6 months ago ) , sp  AVR ( bovine ) ( repaired twice ?),  s/p PPM (medtronic), HFpEF, AVF of right femoral artery, HTN, BPH, admitted with acute onset dyspnea    #acute on chronic DCHF, pulmonary edema in setting of severe Bioprosthetic AR  -p/t s/p successful Shubham TAVR  -CV stable   -Pt on 2L NC right now, continue supplemental O2 as necessary  -PPM interrogation 11/28 - noted w/ normal pacemaker fxn  -JULIANO w/ with severe valvular AI and moderate MR   --s/p Shubham TAVR yesterday 12/5  -cont diuretics per cts  -cont med tx of MR  -poss eventual perc mvr as outpatient if MR still significant  -cont toprol 50 mg daily  -Repeat echo today    #Aortic Insufficiency/Moderate MR  -JULIANO results as mentioned above  -S/p Shubham TAVR on 12/5  -Repeat echo today    #RLE pseudoaneurysm/AV fistula   -during exam before attempting right femoral vein access for RHC --> incidental finding of bounding/pulsatile thrill  -Right fem artery was NOT accessed during cath   -Right LE Arterial doppler revealed  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 Data within normal pacemaking function. Pt with known afib  -resume a/c per cts    #Hx dvt  -recent le doppler neg for dvt     #SP AVR   -stable on echo  -JULIANO w/ with severe paravalvular AI and moderate MR  -s/p SHUBHAM TAVR   -Repeat echo today    #Sp PPM - Medtronic   -Recent ppm interrogation as mentioned above

## 2022-12-06 NOTE — PROGRESS NOTE ADULT - PROBLEM SELECTOR PROBLEM 7
Prolonged QT interval

## 2022-12-06 NOTE — PROGRESS NOTE ADULT - PROBLEM SELECTOR PLAN 5
- Possible from hepatic congestion from heart failure.   - Hepatitis C negative.   - No hx of ETOH use.   - monitor
- Possible from hepatic congestion from heart failure. Hepatitis C negative nov 9 2022. No hx of ETOH use.   - Repeat ordered.
- Possible from hepatic congestion from heart failure.   - Hepatitis C negative.   - No hx of ETOH use.   - monitor
- Possible from hepatic congestion from heart failure. Hepatitis C negative nov 9 2022. No hx of ETOH use.   - Repeat ordered.

## 2022-12-06 NOTE — PROGRESS NOTE ADULT - SUBJECTIVE AND OBJECTIVE BOX
Structural Heart Team    Mr Martin says he is feeling well today.  He reports that he felt improved immediately after his Shubham TAVR- "like night and day" and he denies chest pain/pressure, sob and dizziness.  He does have some mild L groin incisional pain with walking.  There were no acute events overnight.       REVIEW OF SYSTEMS:    CONSTITUTIONAL: No weakness, fevers or chills  EYES/ENT: No visual changes;  No vertigo or throat pain   NECK: No pain or stiffness  RESPIRATORY: No cough, wheezing, hemoptysis; No shortness of breath  CARDIOVASCULAR: No chest pain or palpitations  GASTROINTESTINAL: No abdominal or epigastric pain. No nausea, vomiting, or hematemesis; No diarrhea or constipation. No melena or hematochezia.  GENITOURINARY: No dysuria, frequency or hematuria  NEUROLOGICAL: No numbness or weakness  SKIN: No itching, rashes      Allergies    No Known Allergies    Intolerances      Vital Signs Last 24 Hrs  T(C): 36.5 (06 Dec 2022 12:24), Max: 37 (05 Dec 2022 18:49)  T(F): 97.7 (06 Dec 2022 12:24), Max: 98.6 (05 Dec 2022 18:49)  HR: 60 (06 Dec 2022 12:24) (60 - 62)  BP: 118/71 (06 Dec 2022 12:24) (112/67 - 124/69)  BP(mean): 87 (06 Dec 2022 03:56) (87 - 87)  RR: 18 (06 Dec 2022 13:21) (18 - 18)  SpO2: 93% (06 Dec 2022 13:21) (89% - 93%)    Parameters below as of 06 Dec 2022 13:21  Patient On (Oxygen Delivery Method): room air        MEDICATIONS  (STANDING):  allopurinol 200 milliGRAM(s) Oral daily  aspirin enteric coated 81 milliGRAM(s) Oral daily  atorvastatin 20 milliGRAM(s) Oral at bedtime  buMETAnide 1 milliGRAM(s) Oral daily  finasteride 5 milliGRAM(s) Oral daily  tamsulosin 0.4 milliGRAM(s) Oral at bedtime      Exam-  General: NAD, WDWN, appropriate affect  Cor: s1s2, RRR, no murmurs, rubs or gallops   Pulm: Clear, no wheezes, rales, or rhonchi, no use of accessory muscles  Gastrointestinal: soft, nontender, nondistended, +bowel sounds  Extremities: 2+ pedal edema Right > Left  Groin: L groin and R radial dressings intact, nontender, clean and dry, soft, no hematoma b/l  Neuro: A&Ox3, nonfocal                          9.8    6.83  )-----------( 118      ( 06 Dec 2022 06:19 )             30.2   12-06    140  |  100  |  34<H>  ----------------------------<  111<H>  4.0   |  30  |  1.21    Ca    8.9      06 Dec 2022 06:18    TPro  6.2  /  Alb  3.4  /  TBili  1.2  /  DBili  x   /  AST  18  /  ALT  40  /  AlkPhos  60  12-06    I&O's Summary    05 Dec 2022 07:01  -  06 Dec 2022 07:00  --------------------------------------------------------  IN: 820 mL / OUT: 1575 mL / NET: -755 mL    06 Dec 2022 07:01  -  06 Dec 2022 14:42  --------------------------------------------------------  IN: 520 mL / OUT: 400 mL / NET: 120 mL      < from: Transthoracic Echocardiogram (12.06.22 @ 07:11) >  Mitral Valve: The posteriormitral valve leaflet is short  and tethered.  There is at least moderate-severe mitral  regurgitation.  The jet is eccentric, wall hugging, and  posteriorly directed.  MR VTI= 186cm, MR Vmax= 5.4m/s. The peak/mean diastolic  mitral gradientrs= 10/2mmHg (HR= 60bpm).  Aortic Root: Aortic Root: 3.3 cm.  LVOT diameter: 2.4 cm.  s/p bio-bentall.  Aortic arch= 3.2cm.  Aortic Valve: #26mm+2cc-in-#27mm perimount bio AVR (SHUBHAM).  The valve is well seatedwith normal function.  The peak/mean gradients= 18/9mmHg with a DVI= 0.50. Peak  transaortic valve gradient equals 18 mm Hg, mean  transaortic valve gradient equals 9 mm Hg, aortic valve  velocity time integral equals 43 cm, estimated aortic valve  area equals 2.2 sqcm. There is a very trace paravalvular  aortic regurgitation jet originating from the posterior  aspect of the THV-in-SHV. Peak left ventricular outflow  tract gradient equals 4 mm Hg, mean gradient is equal to 2  mm Hg, LVOT velocitytime integral equals 21 cm.  Left Atrium: Severely dilated left atrium.  LA volume index  = 93 cc/m2.  Left Ventricle: There is mild septal hypokinesis and motion  consistent with a conduction delay.  The left ventricular  funciton is mildly reduced. The LVEF= 48% by biplane  Urban's method. LV EDV index= 96ml/m2. The left ventricle  is moderately dilated. Diastolic dysfunction with elevated  left atrial filling pressures.  Right Heart: Mildly dilated right atrium.  RA area= 24cm2, RA volume= 69ml, RA volume index= 31ml/m2.  Normal right ventricular size and function.  There is a device wire in the right heart. Normal tricuspid  valve. Moderate tricuspid regurgitation. Normal pulmonic  valve. Mild pulmonic regurgitation.  Pericardium/PleuraNormal pericardium with no pericardial  effusion.  Hemodynamic: The right atrial pressure is mildly elevated.  Estimated right ventricular systolic pressure equals 51 mm  Hg, assuming right atrial pressure equals 10 mm Hg,  consistent with moderate pulmonary hypertension.  ------------------------------------------------------------------------  CONCLUSIONS:  1. There is at least moderate-severe mitral regurgitation.  The jet is eccentric, wall hugging, and posteriorly  directed.  MR VTI= 186cm, MR Vmax= 5.4m/s.  2. #26mm+2cc-in-#27mm perimount bio AVR (SHUBHAM).  The valve is well seated with normal function.  The peak/mean gradients= 18/9mmHg with a DVI= 0.50. There  is a very trace paravalvular aortic regurgitation jet  originating from the posterior aspect of the THV-in-SHV.  3. Diastolic dysfunction with elevated left atrial filling  pressures.  4. The right atrial pressure is mildly elevated.  5. Estimated right ventricular systolic pressure equals 51  mm Hg, assuming right atrial pressure equals 10 mm Hg,  consistent with moderate pulmonary hypertension.  *** Compared with echocardiogram of 12/5/2022, no  significant changes noted.    < end of copied text >          Assessment/Plan:  Mr Martin is POD 1 s/p Shubham TAVR (#26 S3) for severe degeneration/ central AI of bioAVR  - asa/xarelto  - resume diuretic today  - echo done and reviewed  - ambulate as tolerated  - ?repair of R groin AV fistula this admission?  - no need for MCOT due to preop PPM    - Discharge plan: follow up with Dr. Mckeon in one week and follow up with Structural Heart Team in one month.  Echo will be done at 1 month follow up visit.  Plan discussed with patient     SUNITHA Bell  [phone]

## 2022-12-06 NOTE — PROGRESS NOTE ADULT - PROBLEM SELECTOR PLAN 1
- Shortness of breath, mild leg edema, and elevated pro-bnp. s/p furosemide in the ED.   - Bumex --> switched to IV per card  - History reviewed as below, past stress test abnl with evidence of infarct and shilo-infarct ischemia, LVEF 59%;   revealing hypokinesis of the basal inferior and basal septal walls and reduced systolic thickening of the basal to mid inferolateral, distal anterior, distal anteroseptal, and apical walls  -s/p recent cath with luminal LCx/Ramus disease.  Mild proximal LAD disease.   - Severely elevated PASP in setting of markedly elevated filling pressures/wedge pressure. Severely elevated PASP likely secondary to increased LA pressure.    - cont diruesis  Bumex. and additional bumex prn sob: monitor CR   - fu with CTS given paravalvular leaky AI and mod MR: Preop Redo AVR and possible MVR with Dr. Mckeon   OR date - Monday 12/5   will cont to hold Xarelto   will stop heparin and start lovenox and change back to heparin 24 hr prior to surgery: hold in AM
npo after midnight  abx on call to OR type and Cross  ck covid pcr  BBlocker held as per Dr Mckeon  PPM interrogation from 11/28 noted. Data within normal pacemaking function. Pt with known afib   Lovenox discontinued
- Shortness of breath, mild leg edema, and elevated pro-bnp. s/p furosemide in the ED.   - Bumex --> switched to IV per card  - History reviewed as below, past stress test abnl with evidence of infarct and shilo-infarct ischemia, LVEF 59%;   revealing hypokinesis of the basal inferior and basal septal walls and reduced systolic thickening of the basal to mid inferolateral, distal anterior, distal anteroseptal, and apical walls  -s/p recent cath with luminal LCx/Ramus disease.  Mild proximal LAD disease.   - Severely elevated PASP in setting of markedly elevated filling pressures/wedge pressure. Severely elevated PASP likely secondary to increased LA pressure.    - cont diruesis  Bumex. and additional bumex prn sob: monitor CR   - fu with CTS given paravalvular leaky AI and mod MR: Preop Redo AVR : now s/p ISRAEL    AC per CTS
- Shortness of breath, mild leg edema, and elevated pro-bnp. s/p furosemide in the ED.   - Bumex --> switched to IV per card  - History reviewed as below, past stress test abnl with evidence of infarct and shilo-infarct ischemia, LVEF 59%;   revealing hypokinesis of the basal inferior and basal septal walls and reduced systolic thickening of the basal to mid inferolateral, distal anterior, distal anteroseptal, and apical walls  -s/p recent cath with luminal LCx/Ramus disease.  Mild proximal LAD disease.   - Severely elevated PASP in setting of markedly elevated filling pressures/wedge pressure. Severely elevated PASP likely secondary to increased LA pressure.   - He was pending JULIANO in past admission, which was canceled due to positive RVP.  - Cardiology consult appreciated. plan for JULIANO tentatively Monday.  NPO on Sunday midnight.  - his dyspneic episodes less frequent on IV Bumex.
- Shortness of breath, mild leg edema, and elevated pro-bnp. s/p furosemide in the ED.   - Bumex --> switched to IV per card  - History reviewed as below, past stress test abnl with evidence of infarct and shilo-infarct ischemia, LVEF 59%;   revealing hypokinesis of the basal inferior and basal septal walls and reduced systolic thickening of the basal to mid inferolateral, distal anterior, distal anteroseptal, and apical walls  -s/p recent cath with luminal LCx/Ramus disease.  Mild proximal LAD disease.   - Severely elevated PASP in setting of markedly elevated filling pressures/wedge pressure. Severely elevated PASP likely secondary to increased LA pressure.    - cont diruesis  Bumex. and additional bumex prn sob: monitor CR   - fu with CTS given paravalvular leaky AI and mod MR: Preop Redo AVR and possible MVR with Dr. Mckeon   OR date - Monday 12/5   hold AC
- Shortness of breath, mild leg edema, and elevated pro-bnp.   - s/p furosemide in the ED.   - Wean off oxygen.   - Restarting bumex --> switched to IV per card  - Monitoring fluid status closely.   - Recent echo.   - History reviewed as below, past stress test abnl with evidence of infarct and shilo-infarct ischemia, LVEF 59%;   revealing hypokinesis of the basal inferior and basal septal walls and reduced systolic thickening of the basal to mid inferolateral, distal anterior, distal anteroseptal, and apical walls  -s/p cath with luminal LCx/Ramus disease.  Mild proximal LAD disease.   -Severely elevated PASP in setting of markedly elevated filling pressures/wedge pressure. Severely elevated PASP likely secondary to increased LA pressure.   He was pending JULIANO in past admission, which was canceled due to positive RVP.  - Cardiology consult appreciated. plan for JULIANO tentatively Monday?  NPO on Sunday midnight.
- Shortness of breath, mild leg edema, and elevated pro-bnp. s/p furosemide in the ED.   - Bumex --> switched to IV per card  - History reviewed as below, past stress test abnl with evidence of infarct and shilo-infarct ischemia, LVEF 59%;   revealing hypokinesis of the basal inferior and basal septal walls and reduced systolic thickening of the basal to mid inferolateral, distal anterior, distal anteroseptal, and apical walls  -s/p recent cath with luminal LCx/Ramus disease.  Mild proximal LAD disease.   - Severely elevated PASP in setting of markedly elevated filling pressures/wedge pressure. Severely elevated PASP likely secondary to increased LA pressure.   - He was pending JULIANO in past admission, which was canceled due to positive RVP.. fu JULIANO in this admission   - his dyspneic episodes less frequent on IV Bumex.
- Shortness of breath, mild leg edema, and elevated pro-bnp. s/p furosemide in the ED.   - Bumex --> switched to IV per card  - History reviewed as below, past stress test abnl with evidence of infarct and shilo-infarct ischemia, LVEF 59%;   revealing hypokinesis of the basal inferior and basal septal walls and reduced systolic thickening of the basal to mid inferolateral, distal anterior, distal anteroseptal, and apical walls  -s/p recent cath with luminal LCx/Ramus disease.  Mild proximal LAD disease.   - Severely elevated PASP in setting of markedly elevated filling pressures/wedge pressure. Severely elevated PASP likely secondary to increased LA pressure.    - cont diruesis  Bumex.  - fu with CTS given paravalvular leaky AI and mod MR: Preop Redo AVR and possible MVR with Dr. Mckeon   OR date - Monday 12/5   will cont to hold Xarelto   will stop heparin and start lovenox and change back to heparin 24 hr prior to surgery
- Shortness of breath, mild leg edema, and elevated pro-bnp. s/p furosemide in the ED.   - Bumex --> switched to IV per card  - History reviewed as below, past stress test abnl with evidence of infarct and shilo-infarct ischemia, LVEF 59%;   revealing hypokinesis of the basal inferior and basal septal walls and reduced systolic thickening of the basal to mid inferolateral, distal anterior, distal anteroseptal, and apical walls  -s/p recent cath with luminal LCx/Ramus disease.  Mild proximal LAD disease.   - Severely elevated PASP in setting of markedly elevated filling pressures/wedge pressure. Severely elevated PASP likely secondary to increased LA pressure.    - cont diruesis  Bumex.  - fu with CTS given paravalvular leaky AI and mod MR: Preop Redo AVR and possible MVR with Dr. Mckeon   OR date - Monday 12/5   will cont to hold Xarelto   will stop heparin and start lovenox and change back to heparin 24 hr prior to surgery
- Shortness of breath, mild leg edema, and elevated pro-bnp. s/p furosemide in the ED.   - Bumex --> switched to IV per card  - History reviewed as below, past stress test abnl with evidence of infarct and shilo-infarct ischemia, LVEF 59%;   revealing hypokinesis of the basal inferior and basal septal walls and reduced systolic thickening of the basal to mid inferolateral, distal anterior, distal anteroseptal, and apical walls  -s/p recent cath with luminal LCx/Ramus disease.  Mild proximal LAD disease.   - Severely elevated PASP in setting of markedly elevated filling pressures/wedge pressure. Severely elevated PASP likely secondary to increased LA pressure.    - cont diruesis  Bumex. and additional bumex prn sob  - fu with CTS given paravalvular leaky AI and mod MR: Preop Redo AVR and possible MVR with Dr. Mckeon   OR date - Monday 12/5   will cont to hold Xarelto   will stop heparin and start lovenox and change back to heparin 24 hr prior to surgery
- Shortness of breath, mild leg edema, and elevated pro-bnp.   - s/p furosemide in the ED.   - Wean off oxygen.   - Restarting bumex --> switched to IV per card  - Monitoring fluid status closely.   - Recent echo.   - History reviewed as below, past stress test abnl with evidence of infarct and shilo-infarct ischemia, LVEF 59%;   revealing hypokinesis of the basal inferior and basal septal walls and reduced systolic thickening of the basal to mid inferolateral, distal anterior, distal anteroseptal, and apical walls  -s/p cath with luminal LCx/Ramus disease.  Mild proximal LAD disease.   -Severely elevated PASP in setting of markedly elevated filling pressures/wedge pressure. Severely elevated PASP likely secondary to increased LA pressure.   He was pending JULIANO in past admission, which was canceled due to positive RVP.  - Cardiology consult appreciated. plan for JULIANO 11/25/22
- Shortness of breath, mild leg edema, and elevated pro-bnp. s/p furosemide in the ED.   - Bumex --> switched to IV per card  - History reviewed as below, past stress test abnl with evidence of infarct and shilo-infarct ischemia, LVEF 59%;   revealing hypokinesis of the basal inferior and basal septal walls and reduced systolic thickening of the basal to mid inferolateral, distal anterior, distal anteroseptal, and apical walls  -s/p recent cath with luminal LCx/Ramus disease.  Mild proximal LAD disease.   - Severely elevated PASP in setting of markedly elevated filling pressures/wedge pressure. Severely elevated PASP likely secondary to increased LA pressure.    - cont diruesis  Bumex.  - fu with CTS given paravalvular leaky AI and mod MR

## 2022-12-06 NOTE — PROGRESS NOTE ADULT - PROBLEM SELECTOR PROBLEM 1
Acute on chronic diastolic congestive heart failure
Paravalvular leak (prosthetic valve)
Acute on chronic diastolic congestive heart failure

## 2022-12-06 NOTE — PROGRESS NOTE ADULT - PROBLEM SELECTOR PLAN 4
c/w metoprolol and Bumex as above

## 2022-12-06 NOTE — PROGRESS NOTE ADULT - SUBJECTIVE AND OBJECTIVE BOX
Date of Service: 12-06-22 @ 13:05    Patient is a 67y old  Male who presents with a chief complaint of Acute heart failure (06 Dec 2022 09:34)      Any change in ROS: Doing pretty good : no sob:  no cough: on 2 L s/p ISRAEL : on 2 L of oxygen  : feels much better    MEDICATIONS  (STANDING):  allopurinol 200 milliGRAM(s) Oral daily  aspirin enteric coated 81 milliGRAM(s) Oral daily  atorvastatin 20 milliGRAM(s) Oral at bedtime  buMETAnide 1 milliGRAM(s) Oral daily  finasteride 5 milliGRAM(s) Oral daily  tamsulosin 0.4 milliGRAM(s) Oral at bedtime    MEDICATIONS  (PRN):    Vital Signs Last 24 Hrs  T(C): 36.5 (06 Dec 2022 12:24), Max: 37 (05 Dec 2022 18:49)  T(F): 97.7 (06 Dec 2022 12:24), Max: 98.6 (05 Dec 2022 18:49)  HR: 60 (06 Dec 2022 12:24) (60 - 62)  BP: 118/71 (06 Dec 2022 12:24) (112/67 - 124/69)  BP(mean): 87 (06 Dec 2022 03:56) (87 - 87)  RR: 18 (06 Dec 2022 12:24) (18 - 18)  SpO2: 90% (06 Dec 2022 12:24) (89% - 93%)    Parameters below as of 06 Dec 2022 12:24  Patient On (Oxygen Delivery Method): room air        I&O's Summary    05 Dec 2022 07:01  -  06 Dec 2022 07:00  --------------------------------------------------------  IN: 820 mL / OUT: 1575 mL / NET: -755 mL    06 Dec 2022 07:01  -  06 Dec 2022 13:05  --------------------------------------------------------  IN: 420 mL / OUT: 400 mL / NET: 20 mL          Physical Exam:   GENERAL: NAD, well-groomed, well-developed  HEENT: BRIA/   Atraumatic, Normocephalic  ENMT: No tonsillar erythema, exudates, or enlargement; Moist mucous membranes, Good dentition, No lesions  NECK: Supple, No JVD, Normal thyroid  CHEST/LUNG: Clear to auscultaion  CVS: Regular rate and rhythm; No murmurs, rubs, or gallops  GI: : Soft, Nontender, Nondistended; Bowel sounds present  NERVOUS SYSTEM:  Alert & Oriented X3  EXTREMITIES:  - edema  LYMPH: No lymphadenopathy noted  SKIN: No rashes or lesions  ENDOCRINOLOGY: No Thyromegaly  PSYCH: Appropriate    Labs:                              9.8    6.83  )-----------( 118      ( 06 Dec 2022 06:19 )             30.2                         11.2   7.74  )-----------( 149      ( 03 Dec 2022 07:49 )             34.3     12-06    140  |  100  |  34<H>  ----------------------------<  111<H>  4.0   |  30  |  1.21  12-04    136  |  97  |  51<H>  ----------------------------<  132<H>  4.6   |  26  |  1.42<H>  12-03    138  |  97  |  48<H>  ----------------------------<  119<H>  4.3   |  26  |  1.43<H>    Ca    8.9      06 Dec 2022 06:18    TPro  6.2  /  Alb  3.4  /  TBili  1.2  /  DBili  x   /  AST  18  /  ALT  40  /  AlkPhos  60  12-06    CAPILLARY BLOOD GLUCOSE          LIVER FUNCTIONS - ( 06 Dec 2022 06:18 )  Alb: 3.4 g/dL / Pro: 6.2 g/dL / ALK PHOS: 60 U/L / ALT: 40 U/L / AST: 18 U/L / GGT: x                     RECENT CULTURES:        RESPIRATORY CULTURES:    rad< from: Xray Chest 1 View- PORTABLE-Urgent (Xray Chest 1 View- PORTABLE-Urgent .) (12.02.22 @ 12:02) >    EXAM: Frontal radiograph of the chest.    COMPARISON: Chest radiograph from 11/27/2022    FINDINGS:  Redemonstrated left chest wall 2-lead pacemaker.  Left lower lung hazy opacity.  There is no pneumothorax.  The heart is normal in size  Redemonstrated sternotomy wires.    IMPRESSION:  Left basilar atelectasis and/or trace effusion.    --- End of Report ---           MILAGRO GOMEZ MD; Resident Radiologist  This document has been electronically signed.  KELLE NASSAR MD; Attending Radiologist  This document has been electronically signed. Dec  2 2022  4:58PM    < end of copied text >  < from: CT Heart without Coronaries w/ IV Cont (11.30.22 @ 12:58) >        Diameters: 7 x 10 (mm)        Tortuosity: Minimal        Calcification: None    IMPRESSION:  1.  Severely calcified (Agatston calcium score) trileaflet aortic valve.  2.  Aortic and peripheral access vessel measurements as reported above.  3. 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 This is best seen on series 21, the thin   section axial images [id number].    --- End of Report ---            BISHNU HIGHTOWER MD; Attending Radiologist  This document has been electronically signed. Dec  1 2022 11:21AM    < end of copied text >        Studies  Chest X-RAY  CT SCAN Chest   Venous Dopplers: LE:   CT Abdomen  Others

## 2022-12-07 ENCOUNTER — TRANSCRIPTION ENCOUNTER (OUTPATIENT)
Age: 67
End: 2022-12-07

## 2022-12-07 VITALS
SYSTOLIC BLOOD PRESSURE: 101 MMHG | HEART RATE: 68 BPM | TEMPERATURE: 98 F | DIASTOLIC BLOOD PRESSURE: 63 MMHG | RESPIRATION RATE: 18 BRPM | WEIGHT: 217.16 LBS | OXYGEN SATURATION: 100 %

## 2022-12-07 LAB
ANION GAP SERPL CALC-SCNC: 11 MMOL/L — SIGNIFICANT CHANGE UP (ref 5–17)
BUN SERPL-MCNC: 28 MG/DL — HIGH (ref 7–23)
CALCIUM SERPL-MCNC: 8.9 MG/DL — SIGNIFICANT CHANGE UP (ref 8.4–10.5)
CHLORIDE SERPL-SCNC: 102 MMOL/L — SIGNIFICANT CHANGE UP (ref 96–108)
CO2 SERPL-SCNC: 29 MMOL/L — SIGNIFICANT CHANGE UP (ref 22–31)
CREAT SERPL-MCNC: 1.06 MG/DL — SIGNIFICANT CHANGE UP (ref 0.5–1.3)
EGFR: 76 ML/MIN/1.73M2 — SIGNIFICANT CHANGE UP
GLUCOSE SERPL-MCNC: 90 MG/DL — SIGNIFICANT CHANGE UP (ref 70–99)
HCT VFR BLD CALC: 31 % — LOW (ref 39–50)
HGB BLD-MCNC: 10 G/DL — LOW (ref 13–17)
MCHC RBC-ENTMCNC: 32.1 PG — SIGNIFICANT CHANGE UP (ref 27–34)
MCHC RBC-ENTMCNC: 32.3 GM/DL — SIGNIFICANT CHANGE UP (ref 32–36)
MCV RBC AUTO: 99.4 FL — SIGNIFICANT CHANGE UP (ref 80–100)
NRBC # BLD: 0 /100 WBCS — SIGNIFICANT CHANGE UP (ref 0–0)
PLATELET # BLD AUTO: 122 K/UL — LOW (ref 150–400)
POTASSIUM SERPL-MCNC: 3.5 MMOL/L — SIGNIFICANT CHANGE UP (ref 3.5–5.3)
POTASSIUM SERPL-SCNC: 3.5 MMOL/L — SIGNIFICANT CHANGE UP (ref 3.5–5.3)
RBC # BLD: 3.12 M/UL — LOW (ref 4.2–5.8)
RBC # FLD: 15.3 % — HIGH (ref 10.3–14.5)
SODIUM SERPL-SCNC: 142 MMOL/L — SIGNIFICANT CHANGE UP (ref 135–145)
WBC # BLD: 6.86 K/UL — SIGNIFICANT CHANGE UP (ref 3.8–10.5)
WBC # FLD AUTO: 6.86 K/UL — SIGNIFICANT CHANGE UP (ref 3.8–10.5)

## 2022-12-07 PROCEDURE — 71045 X-RAY EXAM CHEST 1 VIEW: CPT

## 2022-12-07 PROCEDURE — L8699: CPT

## 2022-12-07 PROCEDURE — 82270 OCCULT BLOOD FECES: CPT

## 2022-12-07 PROCEDURE — 94010 BREATHING CAPACITY TEST: CPT

## 2022-12-07 PROCEDURE — 94640 AIRWAY INHALATION TREATMENT: CPT

## 2022-12-07 PROCEDURE — 85018 HEMOGLOBIN: CPT

## 2022-12-07 PROCEDURE — 76377 3D RENDER W/INTRP POSTPROCES: CPT

## 2022-12-07 PROCEDURE — 84484 ASSAY OF TROPONIN QUANT: CPT

## 2022-12-07 PROCEDURE — 93010 ELECTROCARDIOGRAM REPORT: CPT

## 2022-12-07 PROCEDURE — 85027 COMPLETE CBC AUTOMATED: CPT

## 2022-12-07 PROCEDURE — 99238 HOSP IP/OBS DSCHRG MGMT 30/<: CPT

## 2022-12-07 PROCEDURE — 71046 X-RAY EXAM CHEST 2 VIEWS: CPT

## 2022-12-07 PROCEDURE — 85730 THROMBOPLASTIN TIME PARTIAL: CPT

## 2022-12-07 PROCEDURE — 84145 PROCALCITONIN (PCT): CPT

## 2022-12-07 PROCEDURE — 86900 BLOOD TYPING SEROLOGIC ABO: CPT

## 2022-12-07 PROCEDURE — 85014 HEMATOCRIT: CPT

## 2022-12-07 PROCEDURE — 93355 ECHO TRANSESOPHAGEAL (TEE): CPT

## 2022-12-07 PROCEDURE — 83880 ASSAY OF NATRIURETIC PEPTIDE: CPT

## 2022-12-07 PROCEDURE — 93005 ELECTROCARDIOGRAM TRACING: CPT

## 2022-12-07 PROCEDURE — 93312 ECHO TRANSESOPHAGEAL: CPT

## 2022-12-07 PROCEDURE — U0005: CPT

## 2022-12-07 PROCEDURE — 84132 ASSAY OF SERUM POTASSIUM: CPT

## 2022-12-07 PROCEDURE — C1760: CPT

## 2022-12-07 PROCEDURE — C1769: CPT

## 2022-12-07 PROCEDURE — C1889: CPT

## 2022-12-07 PROCEDURE — 83735 ASSAY OF MAGNESIUM: CPT

## 2022-12-07 PROCEDURE — C1894: CPT

## 2022-12-07 PROCEDURE — 82330 ASSAY OF CALCIUM: CPT

## 2022-12-07 PROCEDURE — 82803 BLOOD GASES ANY COMBINATION: CPT

## 2022-12-07 PROCEDURE — 82947 ASSAY GLUCOSE BLOOD QUANT: CPT

## 2022-12-07 PROCEDURE — 75572 CT HRT W/3D IMAGE: CPT

## 2022-12-07 PROCEDURE — C9399: CPT

## 2022-12-07 PROCEDURE — 84295 ASSAY OF SERUM SODIUM: CPT

## 2022-12-07 PROCEDURE — 73706 CT ANGIO LWR EXTR W/O&W/DYE: CPT

## 2022-12-07 PROCEDURE — 86923 COMPATIBILITY TEST ELECTRIC: CPT

## 2022-12-07 PROCEDURE — C1884: CPT

## 2022-12-07 PROCEDURE — C1887: CPT

## 2022-12-07 PROCEDURE — 86850 RBC ANTIBODY SCREEN: CPT

## 2022-12-07 PROCEDURE — U0003: CPT

## 2022-12-07 PROCEDURE — 76000 FLUOROSCOPY <1 HR PHYS/QHP: CPT

## 2022-12-07 PROCEDURE — 80053 COMPREHEN METABOLIC PANEL: CPT

## 2022-12-07 PROCEDURE — 80048 BASIC METABOLIC PNL TOTAL CA: CPT

## 2022-12-07 PROCEDURE — 81001 URINALYSIS AUTO W/SCOPE: CPT

## 2022-12-07 PROCEDURE — 82435 ASSAY OF BLOOD CHLORIDE: CPT

## 2022-12-07 PROCEDURE — 93320 DOPPLER ECHO COMPLETE: CPT

## 2022-12-07 PROCEDURE — 93325 DOPPLER ECHO COLOR FLOW MAPG: CPT

## 2022-12-07 PROCEDURE — 85610 PROTHROMBIN TIME: CPT

## 2022-12-07 PROCEDURE — 83605 ASSAY OF LACTIC ACID: CPT

## 2022-12-07 PROCEDURE — 82962 GLUCOSE BLOOD TEST: CPT

## 2022-12-07 PROCEDURE — 99285 EMERGENCY DEPT VISIT HI MDM: CPT

## 2022-12-07 PROCEDURE — 0225U NFCT DS DNA&RNA 21 SARSCOV2: CPT

## 2022-12-07 PROCEDURE — 85025 COMPLETE CBC W/AUTO DIFF WBC: CPT

## 2022-12-07 PROCEDURE — 86901 BLOOD TYPING SEROLOGIC RH(D): CPT

## 2022-12-07 PROCEDURE — 93306 TTE W/DOPPLER COMPLETE: CPT

## 2022-12-07 PROCEDURE — 36415 COLL VENOUS BLD VENIPUNCTURE: CPT

## 2022-12-07 RX ORDER — RIVAROXABAN 15 MG-20MG
1 KIT ORAL
Qty: 30 | Refills: 0
Start: 2022-12-07 | End: 2023-01-05

## 2022-12-07 RX ORDER — ATORVASTATIN CALCIUM 80 MG/1
1 TABLET, FILM COATED ORAL
Qty: 0 | Refills: 0 | DISCHARGE
Start: 2022-12-07

## 2022-12-07 RX ORDER — ASPIRIN/CALCIUM CARB/MAGNESIUM 324 MG
1 TABLET ORAL
Qty: 30 | Refills: 0
Start: 2022-12-07 | End: 2023-01-05

## 2022-12-07 RX ORDER — POTASSIUM CHLORIDE 20 MEQ
20 PACKET (EA) ORAL ONCE
Refills: 0 | Status: COMPLETED | OUTPATIENT
Start: 2022-12-07 | End: 2022-12-07

## 2022-12-07 RX ORDER — BUMETANIDE 0.25 MG/ML
1 INJECTION INTRAMUSCULAR; INTRAVENOUS
Refills: 0 | DISCHARGE
Start: 2022-12-07

## 2022-12-07 RX ORDER — RIVAROXABAN 15 MG-20MG
1 KIT ORAL
Qty: 30 | Refills: 0 | DISCHARGE
Start: 2022-12-07 | End: 2023-01-05

## 2022-12-07 RX ADMIN — Medication 20 MILLIEQUIVALENT(S): at 12:10

## 2022-12-07 RX ADMIN — Medication 200 MILLIGRAM(S): at 11:14

## 2022-12-07 RX ADMIN — FINASTERIDE 5 MILLIGRAM(S): 5 TABLET, FILM COATED ORAL at 11:14

## 2022-12-07 RX ADMIN — BUMETANIDE 1 MILLIGRAM(S): 0.25 INJECTION INTRAMUSCULAR; INTRAVENOUS at 04:30

## 2022-12-07 NOTE — DISCHARGE NOTE PROVIDER - NSDCFUSCHEDAPPT_GEN_ALL_CORE_FT
Teddy Mckeon Physician [facility]  [facility]  Scheduled Appointment: 12/15/2022

## 2022-12-07 NOTE — DISCHARGE NOTE PROVIDER - NSDCPNSUBOBJ_GEN_ALL_CORE
PHYSICAL EXAM    Subjective: "I want to go home today."   Neurology: alert and oriented x 3, nonfocal, no gross deficits  CV : tele:  afib v. paced 60  Lungs: bibasilar crackles;  RR easy, unlabored   Abdomen: soft, nontender, nondistended, positive bowel sounds, neg bowel movement   Neg N/V/D; obese abdomen   :  pt voiding without difficulty   Extremities:   BARONE; trace LE edema, neg calf tenderness.   PPP bilaterally; +rt groin bounding thrill; left groin cdi jacqueline- neg bleeding/ hematoma    Discharge pt home today 12/7 as per Dr. Mckeon

## 2022-12-07 NOTE — DISCHARGE NOTE PROVIDER - NSDCMRMEDTOKEN_GEN_ALL_CORE_FT
allopurinol 100 mg oral tablet: 2 tab(s) orally once a day  aspirin 81 mg oral delayed release tablet: 1 tab(s) orally once a day  atorvastatin 20 mg oral tablet: 1 tab(s) orally once a day (at bedtime)  bumetanide 1 mg oral tablet: 1 tab(s) orally once a day  finasteride 5 mg oral tablet: 1 tab(s) orally once a day  metoprolol succinate 50 mg oral tablet, extended release: 1 tab(s) orally once a day  rivaroxaban 20 mg oral tablet: 1 tab(s) orally once a day (before a meal)- start today 12/7  tamsulosin 0.4 mg oral capsule: 1 cap(s) orally 2 times a day  Vitamin C 1000 mg oral tablet: 1 tab(s) orally once a day  Vitamin D3 1000 intl units oral tablet: 1 tab(s) orally once a day

## 2022-12-07 NOTE — PROGRESS NOTE ADULT - PROVIDER SPECIALTY LIST ADULT
Cardiology
Structural Heart
CT Surgery
CT Surgery
Cardiology
Internal Medicine
Structural Heart
Cardiology
Internal Medicine
Pulmonology
CT Surgery
Internal Medicine

## 2022-12-07 NOTE — PROGRESS NOTE ADULT - SUBJECTIVE AND OBJECTIVE BOX
CARDIOLOGY FOLLOW UP - Dr. Carrera  DATE OF SERVICE: 12/7/22    CC  No CV events    REVIEW OF SYSTEMS:  CONSTITUTIONAL: No fever, weight loss, or fatigue  RESPIRATORY: No cough, wheezing, chills or hemoptysis; No Shortness of Breath  CARDIOVASCULAR: No chest pain, palpitations, passing out, dizziness, or leg swelling  GASTROINTESTINAL: No abdominal or epigastric pain. No nausea, vomiting, or hematemesis; No diarrhea or constipation. No melena or hematochezia.  VASCULAR: No edema     PHYSICAL EXAM:  T(C): 36.7 (12-07-22 @ 04:28), Max: 36.7 (12-06-22 @ 19:49)  HR: 68 (12-07-22 @ 04:28) (61 - 68)  BP: 101/63 (12-07-22 @ 04:28) (101/63 - 126/74)  RR: 18 (12-07-22 @ 04:28) (18 - 18)  SpO2: 100% (12-07-22 @ 04:28) (93% - 100%)  Wt(kg): --  I&O's Summary    06 Dec 2022 07:01  -  07 Dec 2022 07:00  --------------------------------------------------------  IN: 1000 mL / OUT: 1275 mL / NET: -275 mL    07 Dec 2022 07:01  -  07 Dec 2022 13:52  --------------------------------------------------------  IN: 440 mL / OUT: 400 mL / NET: 40 mL        Appearance: Normal	  Cardiovascular: Normal S1 S2,RRR, No JVD, No murmurs  Respiratory: Lungs clear to auscultation	  Gastrointestinal:  Soft, Non-tender, + BS	  Extremities: b/l LE edema    Home Medications:  atorvastatin 20 mg oral tablet: 1 tab(s) orally once a day (at bedtime) (07 Dec 2022 11:35)  bumetanide 1 mg oral tablet: 1 tab(s) orally once a day (07 Dec 2022 11:35)  finasteride 5 mg oral tablet: 1 tab(s) orally once a day (24 Nov 2022 10:35)  tamsulosin 0.4 mg oral capsule: 1 cap(s) orally 2 times a day (24 Nov 2022 10:35)  Vitamin C 1000 mg oral tablet: 1 tab(s) orally once a day (24 Nov 2022 10:35)  Vitamin D3 1000 intl units oral tablet: 1 tab(s) orally once a day (24 Nov 2022 10:35)      MEDICATIONS  (STANDING):  allopurinol 200 milliGRAM(s) Oral daily  aspirin enteric coated 81 milliGRAM(s) Oral daily  atorvastatin 20 milliGRAM(s) Oral at bedtime  buMETAnide 1 milliGRAM(s) Oral daily  finasteride 5 milliGRAM(s) Oral daily  tamsulosin 0.4 milliGRAM(s) Oral at bedtime      TELEMETRY: 	    ECG:  	  RADIOLOGY:   DIAGNOSTIC TESTING:  [x] Echocardiogram:  < from: Transthoracic Echocardiogram (12.06.22 @ 07:11) >  CONCLUSIONS:  1. There is at least moderate-severe mitral regurgitation.  The jet is eccentric, wall hugging, and posteriorly  directed.  MR VTI= 186cm, MR Vmax= 5.4m/s.  2. #26mm+2cc-in-#27mm perimount bio AVR (SHUBHAM).  The valve is well seated with normal function.  The peak/mean gradients= 18/9mmHg with a DVI= 0.50. There  is a very trace paravalvular aortic regurgitation jet  originating from the posterior aspect of the THV-in-SHV.  3. Diastolic dysfunction with elevated left atrial filling  pressures.  4. The right atrial pressure is mildly elevated.  5. Estimated right ventricular systolic pressure equals 51  mm Hg, assuming right atrial pressure equals 10 mm Hg,  consistent with moderate pulmonary hypertension.  *** Compared with echocardiogram of 12/5/2022, no  significant changes noted.    < end of copied text >      [ ]  Catheterization:  [ ] Stress Test:    OTHER: 	    LABS:	 	                            10.0   6.86  )-----------( 122      ( 07 Dec 2022 06:09 )             31.0     12-07    142  |  102  |  28<H>  ----------------------------<  90  3.5   |  29  |  1.06    Ca    8.9      07 Dec 2022 06:09    TPro  6.2  /  Alb  3.4  /  TBili  1.2  /  DBili  x   /  AST  18  /  ALT  40  /  AlkPhos  60  12-06          Assessment and Plan:   · Assessment	  66 y/o male with hx of ascending aortic aneurysm repair, DVT ( two sepearte occasions ) was on Eliquis ( last used 6 months ago ) , sp  AVR ( bovine ) ( repaired twice ?),  s/p PPM (medtronic), HFpEF, AVF of right femoral artery, HTN, BPH, admitted with acute onset dyspnea    #acute on chronic DCHF, pulmonary edema in setting of severe Bioprosthetic AR  -p/t s/p successful Shubham TAVR  -CV stable   -PPM interrogation 11/28 - noted w/ normal pacemaker fxn  -JULIANO w/ with severe valvular AI and moderate MR   --s/p Shubham TAVR yesterday 12/5  -cont diuretics per cts  -cont med tx of MR  -poss eventual perc mvr as outpatient if MR still significant  -cont toprol 50 mg daily  -Repeat echo showing known severe MR, AV w normal fxn, LVEF 48%, diastolic dysfunction of L ventricle      #Aortic Insufficiency/Moderate MR  -JULIANO results as mentioned above  -S/p Shubham TAVR on 12/5  -Repeat echo showing known severe MR, AV w normal fxn, LVEF 48%, diastolic dysfunction of L ventricle    #RLE pseudoaneurysm/AV fistula   -during exam before attempting right femoral vein access for RHC --> incidental finding of bounding/pulsatile thrill  -Right fem artery was NOT accessed during cath   -Right LE Arterial doppler revealed  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 Data within normal pacemaking function. Pt with known afib  -resume a/c per cts    #Hx dvt  -recent le doppler neg for dvt     #SP AVR   -stable on echo  -JULIANO w/ with severe paravalvular AI and moderate MR  -s/p SHUBHAM TAVR 12/5/22  -Repeat echo showing known severe MR, AV w normal fxn, LVEF 48%, diastolic dysfunction of L ventricle

## 2022-12-07 NOTE — PROGRESS NOTE ADULT - TIME BILLING
pt, np
Agree with above NP note.  cv stable  JULIANO reviewed, mod-severe AR (?paravalvular leak) mod MR  Paravalular leak unlikely per CTS  CTS to discuss w structural possible TAVR  continue iv bumex as ordered
Patient seen and examined, agree with the above assessment and plan by NP PA.  CV stable  pt s/p Shubham TAVR  dcp
Agree with above NP note.  cv stable  JULIANO reviewed, mod-severe paravalvular leak of the BioAVR and mod MR  Plan for redo AVR and possible MVR on Monday  continue iv bumex as ordered  vasc eval
Agree with above NP note.  cv stable  f/u JULIANO  continue iv bumex as ordered  vasc eval
Agree with above PA note  remains cv stable  JULIANO reviewed, mod-severe AR (?paravalvular leak) mod MR after significant negative diuresis, AI, MR likely severe on presentation  await poss redo AVR, poss MVR per CTs  cont bumex  oral a/c on hold
Patient seen and examined, agree with the above assessment and plan by NP PA.  CV stable  pt s/p Shubham TAVR  if hypoxic consider additonal diuresis
Patient seen and examined.  Agree with above PA note.  events noted  some orthopnea overnight  feles better today   put o2 on   sat oks  diuresing well with iv bumex per i/o  await poss redo AVR, poss MVR per CTS on monday   cont bumex IV BID, give additional iv dose now   oral a/c on hold  continue lovenox, hold on sunday per CTS
pt, np
pt,  cardio
pt,  cardio
pt , family , cp

## 2022-12-07 NOTE — PROGRESS NOTE ADULT - CONVERSATION DETAILS
Ac: resp failure:  secondary to heart failure:  has severe paravalvular AI and mod MR with mod pulm htn : cts evaluation on going:  cont 2 L of oxygen  for now:  pt is not wheezing:  but gets sob on lying down : legs are still swollen:    hypoxia:  on 2 L of oxygen : on last admission he was evaluated thoroughly for hypoxia: likely secondary to chf :     rt femoral artery pseudoaneurysm  with fistula: defer to vascular surgery :    atrial fibrillation: cont ac:  hr controlled:
Ac: resp failure:  secondary to heart failure:  has severe paravalvular AI and mod MR with mod pulm htn : cts evaluation on going:  cont 2 L of oxygen  for now:  pt is not wheezing:  but gets sob on lying down : legs are still swollen:    hypoxia:  on 2 L of oxygen : on last admission he was evaluated thoroughly for hypoxia: likely secondary to chf :     rt femoral artery pseudoaneurysm  with fistula: defer to vascular surgery :    atrial fibrillation: cont ac:  hr controlled:    12/6:    Ac: resp failure:  secondary to heart failure:  has severe paravalvular AI and mod MR with mod pulm htn : s/p israel : tolerated well : still on 2 L of oxygen : can wean it off to room air:  no wheezing    hypoxia:  on 2 L of oxygen : on last admission he was evaluated thoroughly for hypoxia: likely secondary to chf : try to wean off oxygen now:     rt femoral artery pseudoaneurysm  with fistula: defer to vascular surgery / PMD     atrial fibrillation: cont ac:  hr controlled:    pt seems stable:  from pulm point if view:    12/7:    Ac: resp failure:  resolved on room air now: s/p ISRAEL: stable    hypoxia:  resolved    rt femoral artery pseudoaneurysm  with fistula: defer to vascular surgery / PMD     atrial fibrillation: cont ac:  hr controlled:    pt seems stable:  from pulm point if view:
Ac: resp failure:  secondary to heart failure:  has severe paravalvular AI and mod MR with mod pulm htn : cts evaluation on going:  cont 2 L of oxygen  for now:  pt is not wheezing:  but gets sob on lying down : legs are still swollen:    hypoxia:  on 2 L of oxygen : on last admission he was evaluated thoroughly for hypoxia: likely secondary to chf :     rt femoral artery pseudoaneurysm  with fistula: defer to vascular surgery :    atrial fibrillation: cont ac:  hr controlled:    12/6:    Ac: resp failure:  secondary to heart failure:  has severe paravalvular AI and mod MR with mod pulm htn : s/p corwin : tolerated well : still on 2 L of oxygen : can wean it off to room air:  no wheezing    hypoxia:  on 2 L of oxygen : on last admission he was evaluated thoroughly for hypoxia: likely secondary to chf : try to wean off oxygen now:     rt femoral artery pseudoaneurysm  with fistula: defer to vascular surgery / PMD     atrial fibrillation: cont ac:  hr controlled:    pt seems stable:  from pulm point if view:

## 2022-12-07 NOTE — DISCHARGE NOTE PROVIDER - CARE PROVIDER_API CALL
Teddy Mckeon)  Thoracic and Cardiac Surgery  [address] [place] [address]  Phone: [phone]  Fax: [phone]  Follow Up Time:     Edinson Carrera)  Cardiovascular Disease; Interventional Cardiology; Nuclear Cardiology  [address] [facility], [address]  [place] [address]  Phone: [phone]  Fax: [phone]  Follow Up Time:     Serafin Pearson)  Vascular Surgery  [address] [place] [address], [address], [place] [address]  Phone: [phone]  Fax: [phone]  Follow Up Time:

## 2022-12-07 NOTE — PROGRESS NOTE ADULT - REASON FOR ADMISSION
Acute heart failure
Acute Heart Failure Secondary to a Degenerated BioAVR with Severe AI
Acute heart failure

## 2022-12-07 NOTE — DISCHARGE NOTE PROVIDER - NSDCFUADDINST_GEN_ALL_CORE_FT
call Dr. Mckeon for fever > 101  no driving until cleared by Dr. Mckeon  follow up echocardiogram in 30 days at Dr. Mckeon's office  antibiotic prophylaxis prior to any invasive procedures including dental work

## 2022-12-07 NOTE — DISCHARGE NOTE PROVIDER - HOSPITAL COURSE
67M of HTN,Gout,HLD, Mild CAD, diastolic CHF, AAA s/p repair, DVT x 2 separate episodes (s/p Eliquis), s/p AVR (Bovine), s/p medtronic PPM,  BPH, hx of paroxysmal atrial fibrillation on xarelto, possible AV fistula in his Right groin who presents for shortness of breath  He was watching tv and he started feeling short of breath. This prompted him and his son to bring him to the ER. He states he has been taking medications as prescribed. He did not miss a dose. He had stopped aspirin, presumably due to mild CAD. He endorses occasional cough, and mild leg swelling. He was previously admitted on 11/8/22 for acute CHF c/b entero/rhino virus infection. He went to work on Friday after discharge. And the following Monday went to see his PCP.   s/p 12/5 TAVR - unremarkable postop course- tx floor   12/6 VSS: echo mod- severe MR; trace AR; neg pericardial effusion  12/7 VSS; afib v. paced 60-70; no vasc intervention at this time as per cardiology Dr. Carrera and Dr. Pearson; discharge pt home today as per Dr. Mckeon; f/u echo in 3 months and f/u with vasc sx 3-4 weeks

## 2022-12-07 NOTE — DISCHARGE NOTE PROVIDER - PROVIDER TOKENS
PROVIDER:[TOKEN:[[id number]]],PROVIDER:[TOKEN:[[id number]]],PROVIDER:[TOKEN:[[id number]]]

## 2022-12-07 NOTE — PROGRESS NOTE ADULT - SUBJECTIVE AND OBJECTIVE BOX
Date of Service: 12-07-22 @ 14:22    Patient is a 67y old  Male who presents with a chief complaint of Acute heart failure (07 Dec 2022 13:52)      Any change in ROS:  doing pretty good:  on room air:  for dc today    MEDICATIONS  (STANDING):  allopurinol 200 milliGRAM(s) Oral daily  aspirin enteric coated 81 milliGRAM(s) Oral daily  atorvastatin 20 milliGRAM(s) Oral at bedtime  buMETAnide 1 milliGRAM(s) Oral daily  finasteride 5 milliGRAM(s) Oral daily  tamsulosin 0.4 milliGRAM(s) Oral at bedtime    MEDICATIONS  (PRN):    Vital Signs Last 24 Hrs  T(C): 36.7 (07 Dec 2022 04:28), Max: 36.7 (06 Dec 2022 19:49)  T(F): 98.1 (07 Dec 2022 04:28), Max: 98.1 (07 Dec 2022 04:28)  HR: 68 (07 Dec 2022 04:28) (61 - 68)  BP: 101/63 (07 Dec 2022 04:28) (101/63 - 126/74)  BP(mean): 76 (07 Dec 2022 04:28) (76 - 76)  RR: 18 (07 Dec 2022 04:28) (18 - 18)  SpO2: 100% (07 Dec 2022 04:28) (93% - 100%)    Parameters below as of 07 Dec 2022 04:28  Patient On (Oxygen Delivery Method): room air        I&O's Summary    06 Dec 2022 07:01  -  07 Dec 2022 07:00  --------------------------------------------------------  IN: 1000 mL / OUT: 1275 mL / NET: -275 mL    07 Dec 2022 07:01  -  07 Dec 2022 14:22  --------------------------------------------------------  IN: 440 mL / OUT: 400 mL / NET: 40 mL          Physical Exam:   GENERAL: NAD, well-groomed, well-developed  HEENT: BRIA/   Atraumatic, Normocephalic  ENMT: No tonsillar erythema, exudates, or enlargement; Moist mucous membranes, Good dentition, No lesions  NECK: Supple, No JVD, Normal thyroid  CHEST/LUNG: Clear to auscultaion, ; No rales, rhonchi, wheezing, or rubs  CVS: Regular rate and rhythm; No murmurs, rubs, or gallops  GI: : Soft, Nontender, Nondistended; Bowel sounds present  NERVOUS SYSTEM:  Alert & Oriented X3  EXTREMITIES:  2+ Peripheral Pulses, No clubbing, cyanosis, or edema  LYMPH: No lymphadenopathy noted  SKIN: No rashes or lesions  ENDOCRINOLOGY: No Thyromegaly  PSYCH: Appropriate    Labs:                              10.0   6.86  )-----------( 122      ( 07 Dec 2022 06:09 )             31.0                         9.8    6.83  )-----------( 118      ( 06 Dec 2022 06:19 )             30.2     12-07    142  |  102  |  28<H>  ----------------------------<  90  3.5   |  29  |  1.06  12-06    140  |  100  |  34<H>  ----------------------------<  111<H>  4.0   |  30  |  1.21  12-04    136  |  97  |  51<H>  ----------------------------<  132<H>  4.6   |  26  |  1.42<H>    Ca    8.9      07 Dec 2022 06:09  Ca    8.9      06 Dec 2022 06:18    TPro  6.2  /  Alb  3.4  /  TBili  1.2  /  DBili  x   /  AST  18  /  ALT  40  /  AlkPhos  60  12-06    CAPILLARY BLOOD GLUCOSE          LIVER FUNCTIONS - ( 06 Dec 2022 06:18 )  Alb: 3.4 g/dL / Pro: 6.2 g/dL / ALK PHOS: 60 U/L / ALT: 40 U/L / AST: 18 U/L / GGT: x             rad< from: Xray Chest 1 View- PORTABLE-Urgent (Xray Chest 1 View- PORTABLE-Urgent .) (12.02.22 @ 12:02) >    PROCEDURE DATE:  12/02/2022          INTERPRETATION:  CLINICAL INDICATION: Shortness of breath    EXAM: Frontal radiograph of the chest.    COMPARISON: Chest radiograph from 11/27/2022    FINDINGS:  Redemonstrated left chest wall 2-lead pacemaker.  Left lower lung hazy opacity.  There is no pneumothorax.  The heart is normal in size  Redemonstrated sternotomy wires.    IMPRESSION:  Left basilar atelectasis and/or trace effusion.    --- End of Report ---           MILAGRO GOMEZ MD; Resident Radiologist  This document has been electronically signed.  KELLE NASSAR MD; Attending Radiologist  This document has been electronically signed. Dec  2 2022  4:58PM    < end of copied text >          RECENT CULTURES:        RESPIRATORY CULTURES:          Studies  Chest X-RAY  CT SCAN Chest   Venous Dopplers: LE:   CT Abdomen  Others

## 2022-12-07 NOTE — DISCHARGE NOTE PROVIDER - NSDCCPCAREPLAN_GEN_ALL_CORE_FT
PRINCIPAL DISCHARGE DIAGNOSIS  Diagnosis: S/P TAVR (transcatheter aortic valve replacement)  Assessment and Plan of Treatment: shower daily  weigh yourself daily  continue current prescriptions as ordered  increase activity as tolerated   no added salt; low fat; low cholesterol, low salt diet.   follow up with Cardiologist, Dr. Carrera,  in 1-2 weeks. Call to schedule appointment.  follow up with cardiac surgeon, Dr. Mckeon on Dec. 15th at 2:45 pm; call to confirm appointment. [phone]  follow up echocardiogram in 30 days at Dr. Mckeon's office   follow up with Dr. Pearson- vascular surgery for right groin psa/ av fistula in 3-4 weeks; call office to schedule appointment.

## 2022-12-07 NOTE — DISCHARGE NOTE PROVIDER - CARE PROVIDERS DIRECT ADDRESSES
,[email or web address]

## 2022-12-07 NOTE — DISCHARGE NOTE NURSING/CASE MANAGEMENT/SOCIAL WORK - PATIENT PORTAL LINK FT
You can access the FollowMyHealth Patient Portal offered by [facility] by registering at the following website: [email or web address]. By joining [facility]’s FollowMyHealth portal, you will also be able to view your health information using other applications (apps) compatible with our system.

## 2022-12-08 ENCOUNTER — APPOINTMENT (OUTPATIENT)
Dept: CARE COORDINATION | Facility: HOME HEALTH | Age: 67
End: 2022-12-08

## 2022-12-08 RX ORDER — METOPROLOL SUCCINATE 100 MG/1
100 TABLET, EXTENDED RELEASE ORAL
Qty: 90 | Refills: 0 | Status: DISCONTINUED | COMMUNITY
Start: 2022-07-05 | End: 2022-12-08

## 2022-12-08 NOTE — ASSESSMENT
[FreeTextEntry1] : Pt recovering well at home s/p  tavr . Reviewed all medications and dosages with pt understanding. Pt dispenses meds appropriately into med dispenser each week. Pt has all medications in home and is taking as prescribed. Denies pain at this time. No new symptoms, issues or concerns to report at this time. Appt schedule reviewed with pt verbalizing understanding.\par

## 2022-12-08 NOTE — HISTORY OF PRESENT ILLNESS
[FreeTextEntry1] : 67M of HTN,Gout,HLD, Mild CAD, diastolic CHF, AAA s/p repair, DVT x 2 separate \par episodes (s/p Eliquis), s/p AVR (Bovine), s/p medtronic PPM,  BPH, hx of \par paroxysmal atrial fibrillation on xarelto, possible AV fistula in his Right \par groin who presents for shortness of breath  He was watching tv and he started \par feeling short of breath. This prompted him and his son to bring him to the ER. \par He states he has been taking medications as prescribed. He did not miss a dose. \par He had stopped aspirin, presumably due to mild CAD. He endorses occasional \par cough, and mild leg swelling. He was previously admitted on 11/8/22 for acute \par CHF c/b entero/rhino virus infection. He went to work on Friday after \par discharge. And the following Monday went to see his PCP. \par s/p 12/5 TAVR - unremarkable postop course- tx floor \par 12/6 VSS: echo mod- severe MR; trace AR; neg pericardial effusion \par 12/7 VSS; afib v. paced 60-70; no vasc intervention at this time as per \par cardiology Dr. Carrera and Dr. Pearson; discharge pt home today as per  \par Linda; f/u echo in 3 months and f/u with vasc sx 3-4 weeks \par

## 2022-12-08 NOTE — PHYSICAL EXAM
[Neck Appearance] : the appearance of the neck was normal [] : no respiratory distress [Examination Of The Chest] : the chest was normal in appearance [FreeTextEntry2] : bilateral groin sites without erythema, warmth or drainage. Resolving soft, NT ecchymosis to thigh area. \par \par   [Abnormal Walk] : normal gait [Skin Color & Pigmentation] : normal skin color and pigmentation [Cranial Nerves] : cranial nerves 2-12 were intact [Oriented To Time, Place, And Person] : oriented to person, place, and time

## 2022-12-15 ENCOUNTER — APPOINTMENT (OUTPATIENT)
Dept: CARDIOTHORACIC SURGERY | Facility: CLINIC | Age: 67
End: 2022-12-15

## 2022-12-19 ENCOUNTER — APPOINTMENT (OUTPATIENT)
Dept: CARDIOTHORACIC SURGERY | Facility: CLINIC | Age: 67
End: 2022-12-19

## 2022-12-19 ENCOUNTER — NON-APPOINTMENT (OUTPATIENT)
Age: 67
End: 2022-12-19

## 2022-12-19 VITALS
OXYGEN SATURATION: 96 % | TEMPERATURE: 98.2 F | SYSTOLIC BLOOD PRESSURE: 125 MMHG | BODY MASS INDEX: 29.39 KG/M2 | RESPIRATION RATE: 14 BRPM | HEART RATE: 66 BPM | WEIGHT: 217 LBS | HEIGHT: 72 IN | DIASTOLIC BLOOD PRESSURE: 76 MMHG

## 2022-12-19 PROCEDURE — 99213 OFFICE O/P EST LOW 20 MIN: CPT

## 2022-12-19 NOTE — CONSULT LETTER
[FreeTextEntry2] : Edinson Carrera M.D.\par [address] [facility] [address]\[place], [place], [address]\par [phone] / [phone]\par Fax:  [phone] [FreeTextEntry3] : Teddy Mckeon MD\par Director\par [facility]\par Professor \par Cardiovascular & Thoracic Surgery\par [facility].

## 2022-12-19 NOTE — PHYSICAL EXAM
[] : no respiratory distress [Respiration, Rhythm And Depth] : normal respiratory rhythm and effort [Exaggerated Use Of Accessory Muscles For Inspiration] : no accessory muscle use [Auscultation Breath Sounds / Voice Sounds] : lungs were clear to auscultation bilaterally [Apical Impulse] : the apical impulse was normal [Heart Rate And Rhythm] : heart rate was normal and rhythm regular [Heart Sounds] : normal S1 and S2 [Murmurs] : no murmurs [Site: ___] : Site: [unfilled] [Clean] : clean [Dry] : dry [Healing Well] : healing well [Bleeding] : no active bleeding [Foul Odor] : no foul smell [Purulent Drainage] : no purulent drainage [Serosanguinous Drainage] : no serosanguinous drainage [Erythema] : not erythematous [Warm] : not warm [Tender] : not tender [___ +] : left ankle [unfilled]U+ pitting edema

## 2022-12-19 NOTE — REASON FOR VISIT
[de-identified] : TAVR [de-identified] : 12/5/2022 [de-identified] : Unremarkable postop course, echo mod- severe MR; trace AR; neg pericardial effusion,  afib v. paced 60-70; no vasc intervention at this time as per cardiology Dr. Carrera and Dr. Pearson; discharge home; f/u echo in 3 months and f/u with vasc sx 3-4 weeks for hx chronic right femoral PSA with associated AVF. \par \par \par He Presents today and reports feeling well.  Reports he feels a great improvement in his ability to walk, breath and in his endurance.  He is no longer SOB and has no CP.  He has hx of PPM and is following up with his cardiologist today at 3PM as well.  Denies dizziness, palpitations, cough, fever or chills.  Notes some swelling to right leg that comes during the day but goes away in the morning that he has had for several years.  Plans to call to make appointment with vascular Dr. Pearson today.

## 2022-12-19 NOTE — ASSESSMENT
[FreeTextEntry1] : Today on exam, patient's lungs are clear bilaterally, sinus rhythm and bilateral groins are clean, dry and intact. There is no hematoma. No peripheral edema noted on exam. EKG performed and reviewed. SBE antibiotic prophylaxis discussed at length.  Patient instructed to continue current medication regimen and followup with cardiology.\par \par Plan:\par 1) Follow up with cardiologist today as scheduled, TTE in one month\par 2) Follow up with Dr. Pearson of vascular for hx chronic right femoral PSA with associated AVF\par 3) Call with any questions or concerns

## 2022-12-23 ENCOUNTER — APPOINTMENT (OUTPATIENT)
Dept: VASCULAR SURGERY | Facility: CLINIC | Age: 67
End: 2022-12-23

## 2022-12-23 VITALS — BODY MASS INDEX: 29.8 KG/M2 | TEMPERATURE: 98.2 F | WEIGHT: 220 LBS | HEIGHT: 72 IN

## 2022-12-23 VITALS — SYSTOLIC BLOOD PRESSURE: 107 MMHG | DIASTOLIC BLOOD PRESSURE: 70 MMHG | HEART RATE: 75 BPM

## 2022-12-23 DIAGNOSIS — I72.4 ANEURYSM OF ARTERY OF LOWER EXTREMITY: ICD-10-CM

## 2022-12-23 PROCEDURE — 99214 OFFICE O/P EST MOD 30 MIN: CPT

## 2022-12-23 NOTE — HISTORY OF PRESENT ILLNESS
[FreeTextEntry1] : Known R CFA PSA [de-identified] : 12/23: SOB, chest pain, and edema much improved after TAVR. Saw CT surgeon and cardiologist early this week and agreed. No BRAND, SOB, chest pain, and difficulty walking. Discussed that surgery may not be needed now that his symptoms have largely resolved after valve replaced/\par \par 11/23: Known R CFA PSA ?contributing to worsening HF.

## 2022-12-23 NOTE — ASSESSMENT
[FreeTextEntry1] : Pseudoaneurysm of femoral artery (442.3) (I72.4)\par Asymptomatic at this time; SOB resolved, no BRAND, no chest pain\par Agreed to watch CFA PSA/AVF - no intervention at this time as his HF resolved with TAVR\par \par Discussed with Dr. Pearson

## 2022-12-23 NOTE — PHYSICAL EXAM
[Alert] : alert [Oriented to Person] : oriented to person [Oriented to Place] : oriented to place [Oriented to Time] : oriented to time [JVD] : no jugular venous distention  [Ankle Swelling (On Exam)] : not present [de-identified] : No distress [de-identified] : Normal effort [de-identified] : Normal rhythm

## 2022-12-23 NOTE — END OF VISIT
[] : Fellow [FreeTextEntry3] : Long standing R fem psa/avf.  main indication for repair would be high output heartfailure or symptomatic lower ext venous hypertension.  PT doing well after tavr, therefore will hold off on repair, but will continue surveillance\par \par fu 3 mo [Time Spent: ___ minutes] : I have spent [unfilled] minutes of time on the encounter.

## 2023-01-10 ENCOUNTER — TRANSCRIPTION ENCOUNTER (OUTPATIENT)
Age: 68
End: 2023-01-10

## 2023-01-31 ENCOUNTER — APPOINTMENT (OUTPATIENT)
Dept: CARDIOLOGY | Facility: CLINIC | Age: 68
End: 2023-01-31

## 2023-03-15 NOTE — PROGRESS NOTE ADULT - SUBJECTIVE AND OBJECTIVE BOX
CARDIOLOGY FOLLOW UP - Dr. Carrera  DATE OF SERVICE: 12/2/22    CC  Pt c/o SOB. He woke up out of his sleep last night d/t orthopnea. He is now on 2L NC. No CP, no palpitations    REVIEW OF SYSTEMS:  CONSTITUTIONAL: No fever, weight loss, or fatigue  RESPIRATORY: No cough, wheezing, chills or hemoptysis; +SOB  CARDIOVASCULAR: No chest pain, palpitations, passing out, dizziness, or leg swelling  GASTROINTESTINAL: No abdominal or epigastric pain. No nausea, vomiting, or hematemesis; No diarrhea or constipation. No melena or hematochezia.  VASCULAR: No edema     PHYSICAL EXAM:  T(C): 36.4 (12-02-22 @ 04:18), Max: 36.7 (12-01-22 @ 11:42)  HR: 62 (12-02-22 @ 04:18) (62 - 77)  BP: 104/54 (12-02-22 @ 04:18) (101/62 - 104/54)  RR: 18 (12-02-22 @ 04:18) (18 - 18)  SpO2: 98% (12-02-22 @ 04:18) (95% - 98%)  Wt(kg): --  I&O's Summary    01 Dec 2022 07:01  -  02 Dec 2022 07:00  --------------------------------------------------------  IN: 800 mL / OUT: 2250 mL / NET: -1450 mL        Appearance: Normal	  Cardiovascular: Normal S1 S2,RRR, No JVD, +diastolic murmur  Respiratory: Lungs clear to auscultation b/l  Gastrointestinal:  Soft, Non-tender, + BS	  Extremities: Normal range of motion, No clubbing, cyanosis or edema      Home Medications:  finasteride 5 mg oral tablet: 1 tab(s) orally once a day (24 Nov 2022 10:35)  tamsulosin 0.4 mg oral capsule: 1 cap(s) orally 2 times a day (24 Nov 2022 10:35)  Vitamin C 1000 mg oral tablet: 1 tab(s) orally once a day (24 Nov 2022 10:35)  Vitamin D3 1000 intl units oral tablet: 1 tab(s) orally once a day (24 Nov 2022 10:35)      MEDICATIONS  (STANDING):  albuterol/ipratropium for Nebulization. 3 milliLiter(s) Nebulizer once  allopurinol 200 milliGRAM(s) Oral daily  ascorbic acid 500 milliGRAM(s) Oral daily  buMETAnide Injectable 1 milliGRAM(s) IV Push two times a day  cholecalciferol 1000 Unit(s) Oral daily  enoxaparin Injectable 100 milliGRAM(s) SubCutaneous every 12 hours  finasteride 5 milliGRAM(s) Oral daily  metoprolol succinate ER 50 milliGRAM(s) Oral daily  tamsulosin 0.4 milliGRAM(s) Oral two times a day      TELEMETRY: 	    ECG:  	  RADIOLOGY:   DIAGNOSTIC TESTING:  [ ] Echocardiogram:  [ ]  Catheterization:  [ ] Stress Test:    OTHER: 	    LABS:	 	      JULIANO 11/28: Mod-severe paravalvular leak of the BioAVR and mod MR    A/P  66 y/o male with hx of ascending aortic aneurysm repair, DVT ( two sepearte occasions ) was on Eliquis ( last used 6 months ago ) , sp  AVR ( bovine ) ( repaired twice ?),  s/p PPM (medtronic), HFpEF, AVF of right femoral artery, HTN, BPH, admitted with acute onset dyspnea    #acute on chronic DCHF, pulmonary edema  -recurrent HF, pulmonary edema likely d/t  severe paravalvular AI and moderate MR as seen on JULIANO  -was doing fine for a few days but had 1/2 can of sardines/+++salt intake few hours before onset of dyspnea  -previous CTA chest No pulmonary embolism  -no ACS  -RECENT echo with nl LV sys fx, nl fxn bio avr   -recent cath with luminal LCx/Ramus disease.  Mild proximal LAD disease.   -recent RHC with severely elevated PASP in setting of markedly elevated filling pressures/wedge pressure. Severely elevated PASP likely secondary to increased LA pressure  + RVP recently   -recent bubble study ok  -New SOB overnight/this AM  -Will get repeat CXR  -Pt on 2L NC right now, continue supplemental O2 as necessary  -PPM interrogation 11/28 - noted w/ normal pacemaker fxn  -JULIANO w/ with severe paravalvular AI and moderate MR   -Appreciate CT sx consult  -Plan for redo AVR and Poss MVR on Monday 12/5  -cont iv bumex as ordered bid for now  -cont toprol 50 mg daily    #Aortic Insufficiency/Moderate MR  -JULIANO results as mentioned above  -Plan for redo AVR and Poss MVR on Monday 12/5    #RLE pseudoaneurysm/AV fistula   -during exam before attempting right femoral vein access for RHC --> incidental finding of bounding/pulsatile thrill  -Right fem artery was NOT accessed during cath   -Right LE Arterial doppler revealed  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 Data within normal pacemaking function. Pt with known afib  -cont eliquis    #Hx dvt  -recent le doppler neg for dvt     #SP AVR   -stable on echo  -JULIANO w/ with severe paravalvular AI and moderate MR    #Sp PPM - Medtronic   -Recent ppm interrogation as mentioned above                       CARDIOLOGY FOLLOW UP - Dr. Carrera  DATE OF SERVICE: 12/2/22    CC  Pt c/o SOB. He woke up out of his sleep last night d/t orthopnea. He is now on 2L NC. No CP, no palpitations    REVIEW OF SYSTEMS:  CONSTITUTIONAL: No fever, weight loss, or fatigue  RESPIRATORY: No cough, wheezing, chills or hemoptysis; +SOB  CARDIOVASCULAR: No chest pain, palpitations, passing out, dizziness, or leg swelling  GASTROINTESTINAL: No abdominal or epigastric pain. No nausea, vomiting, or hematemesis; No diarrhea or constipation. No melena or hematochezia.  VASCULAR: No edema     PHYSICAL EXAM:  T(C): 36.4 (12-02-22 @ 04:18), Max: 36.7 (12-01-22 @ 11:42)  HR: 62 (12-02-22 @ 04:18) (62 - 77)  BP: 104/54 (12-02-22 @ 04:18) (101/62 - 104/54)  RR: 18 (12-02-22 @ 04:18) (18 - 18)  SpO2: 98% (12-02-22 @ 04:18) (95% - 98%)  Wt(kg): --  I&O's Summary    01 Dec 2022 07:01  -  02 Dec 2022 07:00  --------------------------------------------------------  IN: 800 mL / OUT: 2250 mL / NET: -1450 mL        Appearance: Normal	  Cardiovascular: Normal S1 S2,RRR, No JVD, +diastolic murmur  Respiratory: Lungs clear to auscultation b/l  Gastrointestinal:  Soft, Non-tender, + BS	  Extremities: Normal range of motion, No clubbing, cyanosis or edema      Home Medications:  finasteride 5 mg oral tablet: 1 tab(s) orally once a day (24 Nov 2022 10:35)  tamsulosin 0.4 mg oral capsule: 1 cap(s) orally 2 times a day (24 Nov 2022 10:35)  Vitamin C 1000 mg oral tablet: 1 tab(s) orally once a day (24 Nov 2022 10:35)  Vitamin D3 1000 intl units oral tablet: 1 tab(s) orally once a day (24 Nov 2022 10:35)      MEDICATIONS  (STANDING):  albuterol/ipratropium for Nebulization. 3 milliLiter(s) Nebulizer once  allopurinol 200 milliGRAM(s) Oral daily  ascorbic acid 500 milliGRAM(s) Oral daily  buMETAnide Injectable 1 milliGRAM(s) IV Push two times a day  cholecalciferol 1000 Unit(s) Oral daily  enoxaparin Injectable 100 milliGRAM(s) SubCutaneous every 12 hours  finasteride 5 milliGRAM(s) Oral daily  metoprolol succinate ER 50 milliGRAM(s) Oral daily  tamsulosin 0.4 milliGRAM(s) Oral two times a day      TELEMETRY: Sinus 60s    ECG:  	  RADIOLOGY:   DIAGNOSTIC TESTING:  [ ] Echocardiogram:  [ ]  Catheterization:  [ ] Stress Test:    OTHER: 	    LABS:	 	      JULIANO 11/28: Mod-severe paravalvular leak of the BioAVR and mod MR    A/P  68 y/o male with hx of ascending aortic aneurysm repair, DVT ( two sepearte occasions ) was on Eliquis ( last used 6 months ago ) , sp  AVR ( bovine ) ( repaired twice ?),  s/p PPM (medtronic), HFpEF, AVF of right femoral artery, HTN, BPH, admitted with acute onset dyspnea    #acute on chronic DCHF, pulmonary edema  -recurrent HF, pulmonary edema likely d/t  severe paravalvular AI and moderate MR as seen on JULIANO  -was doing fine for a few days but had 1/2 can of sardines/+++salt intake few hours before onset of dyspnea  -previous CTA chest No pulmonary embolism  -no ACS  -RECENT echo with nl LV sys fx, nl fxn bio avr   -recent cath with luminal LCx/Ramus disease.  Mild proximal LAD disease.   -recent RHC with severely elevated PASP in setting of markedly elevated filling pressures/wedge pressure. Severely elevated PASP likely secondary to increased LA pressure  + RVP recently   -recent bubble study ok  -New SOB overnight/this AM  -Will get repeat CXR  -Give additional 1mg IV bumex now  -Pt on 2L NC right now, continue supplemental O2 as necessary  -PPM interrogation 11/28 - noted w/ normal pacemaker fxn  -JULIANO w/ with severe paravalvular AI and moderate MR   -Appreciate CT sx consult  -Plan for redo AVR and Poss MVR on Monday 12/5  -cont iv bumex as ordered bid for now  -cont toprol 50 mg daily    #Aortic Insufficiency/Moderate MR  -JULIANO results as mentioned above  -Plan for redo AVR and Poss MVR on Monday 12/5    #RLE pseudoaneurysm/AV fistula   -during exam before attempting right femoral vein access for RHC --> incidental finding of bounding/pulsatile thrill  -Right fem artery was NOT accessed during cath   -Right LE Arterial doppler revealed  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 Data within normal pacemaking function. Pt with known afib  -cont eliquis    #Hx dvt  -recent le doppler neg for dvt     #SP AVR   -stable on echo  -JULIANO w/ with severe paravalvular AI and moderate MR    #Sp PPM - Medtronic   -Recent ppm interrogation as mentioned above                       w/ RW/good balance

## 2023-09-15 ENCOUNTER — APPOINTMENT (OUTPATIENT)
Dept: ORTHOPEDIC SURGERY | Facility: CLINIC | Age: 68
End: 2023-09-15
Payer: MEDICARE

## 2023-09-15 VITALS
TEMPERATURE: 97.9 F | HEART RATE: 85 BPM | HEIGHT: 72 IN | BODY MASS INDEX: 30.48 KG/M2 | OXYGEN SATURATION: 98 % | SYSTOLIC BLOOD PRESSURE: 122 MMHG | WEIGHT: 225 LBS | DIASTOLIC BLOOD PRESSURE: 74 MMHG

## 2023-09-15 DIAGNOSIS — M25.572 PAIN IN LEFT ANKLE AND JOINTS OF LEFT FOOT: ICD-10-CM

## 2023-09-15 PROCEDURE — 73610 X-RAY EXAM OF ANKLE: CPT | Mod: LT

## 2023-09-15 PROCEDURE — 73630 X-RAY EXAM OF FOOT: CPT | Mod: LT

## 2023-09-15 PROCEDURE — 99203 OFFICE O/P NEW LOW 30 MIN: CPT

## 2023-10-16 ENCOUNTER — APPOINTMENT (OUTPATIENT)
Dept: ORTHOPEDIC SURGERY | Facility: CLINIC | Age: 68
End: 2023-10-16
Payer: MEDICARE

## 2023-10-16 VITALS
DIASTOLIC BLOOD PRESSURE: 73 MMHG | SYSTOLIC BLOOD PRESSURE: 105 MMHG | BODY MASS INDEX: 30.48 KG/M2 | HEART RATE: 87 BPM | OXYGEN SATURATION: 96 % | TEMPERATURE: 97.4 F | HEIGHT: 72 IN | WEIGHT: 225 LBS

## 2023-10-16 DIAGNOSIS — M21.42 FLAT FOOT [PES PLANUS] (ACQUIRED), RIGHT FOOT: ICD-10-CM

## 2023-10-16 DIAGNOSIS — M21.41 FLAT FOOT [PES PLANUS] (ACQUIRED), RIGHT FOOT: ICD-10-CM

## 2023-10-16 PROCEDURE — 99213 OFFICE O/P EST LOW 20 MIN: CPT

## 2023-10-30 ENCOUNTER — APPOINTMENT (OUTPATIENT)
Dept: UROLOGY | Facility: CLINIC | Age: 68
End: 2023-10-30
Payer: MEDICARE

## 2023-10-30 VITALS
HEIGHT: 72 IN | BODY MASS INDEX: 30.48 KG/M2 | DIASTOLIC BLOOD PRESSURE: 79 MMHG | WEIGHT: 225 LBS | HEART RATE: 68 BPM | RESPIRATION RATE: 16 BRPM | SYSTOLIC BLOOD PRESSURE: 123 MMHG | TEMPERATURE: 97.2 F

## 2023-10-30 DIAGNOSIS — Z95.2 PRESENCE OF PROSTHETIC HEART VALVE: ICD-10-CM

## 2023-10-30 DIAGNOSIS — R33.9 RETENTION OF URINE, UNSPECIFIED: ICD-10-CM

## 2023-10-30 PROCEDURE — 99204 OFFICE O/P NEW MOD 45 MIN: CPT

## 2023-12-04 ENCOUNTER — OUTPATIENT (OUTPATIENT)
Dept: OUTPATIENT SERVICES | Facility: HOSPITAL | Age: 68
LOS: 1 days | End: 2023-12-04
Payer: MEDICARE

## 2023-12-04 VITALS
OXYGEN SATURATION: 97 % | WEIGHT: 238.1 LBS | TEMPERATURE: 98 F | HEIGHT: 72 IN | RESPIRATION RATE: 16 BRPM | HEART RATE: 60 BPM | DIASTOLIC BLOOD PRESSURE: 82 MMHG | SYSTOLIC BLOOD PRESSURE: 128 MMHG

## 2023-12-04 DIAGNOSIS — Z95.2 PRESENCE OF PROSTHETIC HEART VALVE: Chronic | ICD-10-CM

## 2023-12-04 DIAGNOSIS — N40.1 BENIGN PROSTATIC HYPERPLASIA WITH LOWER URINARY TRACT SYMPTOMS: ICD-10-CM

## 2023-12-04 DIAGNOSIS — Z98.890 OTHER SPECIFIED POSTPROCEDURAL STATES: Chronic | ICD-10-CM

## 2023-12-04 DIAGNOSIS — Z95.0 PRESENCE OF CARDIAC PACEMAKER: Chronic | ICD-10-CM

## 2023-12-04 DIAGNOSIS — Z01.818 ENCOUNTER FOR OTHER PREPROCEDURAL EXAMINATION: ICD-10-CM

## 2023-12-04 DIAGNOSIS — G47.33 OBSTRUCTIVE SLEEP APNEA (ADULT) (PEDIATRIC): ICD-10-CM

## 2023-12-04 DIAGNOSIS — I25.10 ATHEROSCLEROTIC HEART DISEASE OF NATIVE CORONARY ARTERY WITHOUT ANGINA PECTORIS: ICD-10-CM

## 2023-12-04 DIAGNOSIS — Z29.9 ENCOUNTER FOR PROPHYLACTIC MEASURES, UNSPECIFIED: ICD-10-CM

## 2023-12-04 LAB
ANION GAP SERPL CALC-SCNC: 10 MMOL/L — SIGNIFICANT CHANGE UP (ref 5–17)
ANION GAP SERPL CALC-SCNC: 10 MMOL/L — SIGNIFICANT CHANGE UP (ref 5–17)
BLD GP AB SCN SERPL QL: NEGATIVE — SIGNIFICANT CHANGE UP
BLD GP AB SCN SERPL QL: NEGATIVE — SIGNIFICANT CHANGE UP
BUN SERPL-MCNC: 25 MG/DL — HIGH (ref 7–23)
BUN SERPL-MCNC: 25 MG/DL — HIGH (ref 7–23)
CALCIUM SERPL-MCNC: 9.4 MG/DL — SIGNIFICANT CHANGE UP (ref 8.4–10.5)
CALCIUM SERPL-MCNC: 9.4 MG/DL — SIGNIFICANT CHANGE UP (ref 8.4–10.5)
CHLORIDE SERPL-SCNC: 104 MMOL/L — SIGNIFICANT CHANGE UP (ref 96–108)
CHLORIDE SERPL-SCNC: 104 MMOL/L — SIGNIFICANT CHANGE UP (ref 96–108)
CO2 SERPL-SCNC: 26 MMOL/L — SIGNIFICANT CHANGE UP (ref 22–31)
CO2 SERPL-SCNC: 26 MMOL/L — SIGNIFICANT CHANGE UP (ref 22–31)
CREAT SERPL-MCNC: 0.85 MG/DL — SIGNIFICANT CHANGE UP (ref 0.5–1.3)
CREAT SERPL-MCNC: 0.85 MG/DL — SIGNIFICANT CHANGE UP (ref 0.5–1.3)
EGFR: 93 ML/MIN/1.73M2 — SIGNIFICANT CHANGE UP
EGFR: 93 ML/MIN/1.73M2 — SIGNIFICANT CHANGE UP
GLUCOSE SERPL-MCNC: 122 MG/DL — HIGH (ref 70–99)
GLUCOSE SERPL-MCNC: 122 MG/DL — HIGH (ref 70–99)
HCT VFR BLD CALC: 41.4 % — SIGNIFICANT CHANGE UP (ref 39–50)
HCT VFR BLD CALC: 41.4 % — SIGNIFICANT CHANGE UP (ref 39–50)
HGB BLD-MCNC: 13.8 G/DL — SIGNIFICANT CHANGE UP (ref 13–17)
HGB BLD-MCNC: 13.8 G/DL — SIGNIFICANT CHANGE UP (ref 13–17)
MCHC RBC-ENTMCNC: 32.2 PG — SIGNIFICANT CHANGE UP (ref 27–34)
MCHC RBC-ENTMCNC: 32.2 PG — SIGNIFICANT CHANGE UP (ref 27–34)
MCHC RBC-ENTMCNC: 33.3 GM/DL — SIGNIFICANT CHANGE UP (ref 32–36)
MCHC RBC-ENTMCNC: 33.3 GM/DL — SIGNIFICANT CHANGE UP (ref 32–36)
MCV RBC AUTO: 96.5 FL — SIGNIFICANT CHANGE UP (ref 80–100)
MCV RBC AUTO: 96.5 FL — SIGNIFICANT CHANGE UP (ref 80–100)
NRBC # BLD: 0 /100 WBCS — SIGNIFICANT CHANGE UP (ref 0–0)
NRBC # BLD: 0 /100 WBCS — SIGNIFICANT CHANGE UP (ref 0–0)
PLATELET # BLD AUTO: 170 K/UL — SIGNIFICANT CHANGE UP (ref 150–400)
PLATELET # BLD AUTO: 170 K/UL — SIGNIFICANT CHANGE UP (ref 150–400)
POTASSIUM SERPL-MCNC: 4.1 MMOL/L — SIGNIFICANT CHANGE UP (ref 3.5–5.3)
POTASSIUM SERPL-MCNC: 4.1 MMOL/L — SIGNIFICANT CHANGE UP (ref 3.5–5.3)
POTASSIUM SERPL-SCNC: 4.1 MMOL/L — SIGNIFICANT CHANGE UP (ref 3.5–5.3)
POTASSIUM SERPL-SCNC: 4.1 MMOL/L — SIGNIFICANT CHANGE UP (ref 3.5–5.3)
RBC # BLD: 4.29 M/UL — SIGNIFICANT CHANGE UP (ref 4.2–5.8)
RBC # BLD: 4.29 M/UL — SIGNIFICANT CHANGE UP (ref 4.2–5.8)
RBC # FLD: 14.7 % — HIGH (ref 10.3–14.5)
RBC # FLD: 14.7 % — HIGH (ref 10.3–14.5)
RH IG SCN BLD-IMP: NEGATIVE — SIGNIFICANT CHANGE UP
RH IG SCN BLD-IMP: NEGATIVE — SIGNIFICANT CHANGE UP
SODIUM SERPL-SCNC: 140 MMOL/L — SIGNIFICANT CHANGE UP (ref 135–145)
SODIUM SERPL-SCNC: 140 MMOL/L — SIGNIFICANT CHANGE UP (ref 135–145)
WBC # BLD: 6.43 K/UL — SIGNIFICANT CHANGE UP (ref 3.8–10.5)
WBC # BLD: 6.43 K/UL — SIGNIFICANT CHANGE UP (ref 3.8–10.5)
WBC # FLD AUTO: 6.43 K/UL — SIGNIFICANT CHANGE UP (ref 3.8–10.5)
WBC # FLD AUTO: 6.43 K/UL — SIGNIFICANT CHANGE UP (ref 3.8–10.5)

## 2023-12-04 PROCEDURE — G0463: CPT

## 2023-12-04 PROCEDURE — 87086 URINE CULTURE/COLONY COUNT: CPT

## 2023-12-04 PROCEDURE — 80048 BASIC METABOLIC PNL TOTAL CA: CPT

## 2023-12-04 PROCEDURE — 86900 BLOOD TYPING SEROLOGIC ABO: CPT

## 2023-12-04 PROCEDURE — 86901 BLOOD TYPING SEROLOGIC RH(D): CPT

## 2023-12-04 PROCEDURE — 86850 RBC ANTIBODY SCREEN: CPT

## 2023-12-04 PROCEDURE — 36415 COLL VENOUS BLD VENIPUNCTURE: CPT

## 2023-12-04 PROCEDURE — 85027 COMPLETE CBC AUTOMATED: CPT

## 2023-12-04 NOTE — H&P PST ADULT - PRIMARY CARE PROVIDER
PCP and Cardio Dr. Segura [phone] next visit 12/5/23 PCP and Cardio Dr. Segura [phone] next visit 12/5/23

## 2023-12-04 NOTE — H&P PST ADULT - NSICDXPASTMEDICALHX_GEN_ALL_CORE_FT
PAST MEDICAL HISTORY:  A-fib     Cardiac murmur     History of BPH     Hypertension     Malignant melanoma left back    CHARLI on CPAP     Pseudoaneurysm of femoral artery

## 2023-12-04 NOTE — H&P PST ADULT - PROBLEM SELECTOR PLAN 1
Cystoscopy Laser Enucleation of Prostate with Morcellation on 12/14/23.   Pre-op education provided - all questions answered. Pt verbalized understanding

## 2023-12-04 NOTE — H&P PST ADULT - NSICDXPASTSURGICALHX_GEN_ALL_CORE_FT
PAST SURGICAL HISTORY:  Cardiac pacemaker 05/2013    History of heart valve replacement 2005, 2013, porcine    History of transcatheter aortic valve replacement (TAVR)     S/P abdominal aortic aneurysm repair 2013

## 2023-12-04 NOTE — H&P PST ADULT - PROBLEM SELECTOR PLAN 2
Cardiac Eval/Pacemaker report  pending  continue on antihypertensive medications   Hold Xarelto 3 days prior to surgery  continue on Asprin until day of surgery

## 2023-12-04 NOTE — H&P PST ADULT - HISTORY OF PRESENT ILLNESS
69 y/o M with h/o HTN, Gout, HLD, CAD, diastolic CHF, AAA (2013) s/p repair, DVT x 2 separate episodes (2005 and 2020 both episode after long  flight), s/p AVR (Bovine), s/p Medtronic PPM, Paroxysmal A-fib on Xarelto, s/p TAVR 12/5/22 and known BPH c/o frequency, weak stream, hesitancy, urgency, frequency, nocturia and straining for "few years" Pt report 2 episodes of urinary retention requiring Rivera catheter (5 years ago and Sept 2023 while on trip in [place]). Today he presents to PST for scheduled Cystoscopy Laser Enucleation of Prostate with Morcellation on 12/14/23. Denies any palpitations, SOB, N/V, fever or chills.

## 2023-12-04 NOTE — H&P PST ADULT - NSICDXFAMILYHX_GEN_ALL_CORE_FT
FAMILY HISTORY:  Father  Still living? Unknown  FH: colon cancer, Age at diagnosis: Age Unknown    Mother  Still living? Unknown  FH: lung cancer, Age at diagnosis: Age Unknown

## 2023-12-05 LAB
CULTURE RESULTS: SIGNIFICANT CHANGE UP
CULTURE RESULTS: SIGNIFICANT CHANGE UP
SPECIMEN SOURCE: SIGNIFICANT CHANGE UP
SPECIMEN SOURCE: SIGNIFICANT CHANGE UP

## 2023-12-06 PROBLEM — G47.33 OBSTRUCTIVE SLEEP APNEA (ADULT) (PEDIATRIC): Chronic | Status: ACTIVE | Noted: 2023-12-04

## 2023-12-06 PROBLEM — I72.4 ANEURYSM OF ARTERY OF LOWER EXTREMITY: Chronic | Status: ACTIVE | Noted: 2023-12-04

## 2023-12-06 PROBLEM — I48.91 UNSPECIFIED ATRIAL FIBRILLATION: Chronic | Status: ACTIVE | Noted: 2023-12-04

## 2023-12-07 ENCOUNTER — NON-APPOINTMENT (OUTPATIENT)
Age: 68
End: 2023-12-07

## 2023-12-07 ENCOUNTER — APPOINTMENT (OUTPATIENT)
Dept: OPHTHALMOLOGY | Facility: CLINIC | Age: 68
End: 2023-12-07
Payer: MEDICARE

## 2023-12-07 PROCEDURE — 92134 CPTRZ OPH DX IMG PST SGM RTA: CPT

## 2023-12-07 PROCEDURE — 92025 CPTRIZED CORNEAL TOPOGRAPHY: CPT

## 2023-12-07 PROCEDURE — 92014 COMPRE OPH EXAM EST PT 1/>: CPT

## 2023-12-21 ENCOUNTER — APPOINTMENT (OUTPATIENT)
Dept: CV DIAGNOSITCS | Facility: HOSPITAL | Age: 68
End: 2023-12-21

## 2023-12-21 ENCOUNTER — OUTPATIENT (OUTPATIENT)
Dept: OUTPATIENT SERVICES | Facility: HOSPITAL | Age: 68
LOS: 1 days | End: 2023-12-21
Payer: MEDICARE

## 2023-12-21 VITALS
RESPIRATION RATE: 16 BRPM | DIASTOLIC BLOOD PRESSURE: 56 MMHG | SYSTOLIC BLOOD PRESSURE: 99 MMHG | TEMPERATURE: 98 F | HEART RATE: 60 BPM | OXYGEN SATURATION: 93 %

## 2023-12-21 VITALS
SYSTOLIC BLOOD PRESSURE: 118 MMHG | HEART RATE: 60 BPM | DIASTOLIC BLOOD PRESSURE: 74 MMHG | RESPIRATION RATE: 16 BRPM | OXYGEN SATURATION: 93 %

## 2023-12-21 DIAGNOSIS — Z95.2 PRESENCE OF PROSTHETIC HEART VALVE: Chronic | ICD-10-CM

## 2023-12-21 DIAGNOSIS — I25.10 ATHEROSCLEROTIC HEART DISEASE OF NATIVE CORONARY ARTERY WITHOUT ANGINA PECTORIS: ICD-10-CM

## 2023-12-21 DIAGNOSIS — Z95.0 PRESENCE OF CARDIAC PACEMAKER: Chronic | ICD-10-CM

## 2023-12-21 DIAGNOSIS — Z98.890 OTHER SPECIFIED POSTPROCEDURAL STATES: Chronic | ICD-10-CM

## 2023-12-21 PROCEDURE — 76377 3D RENDER W/INTRP POSTPROCES: CPT | Mod: 26

## 2023-12-21 PROCEDURE — 93320 DOPPLER ECHO COMPLETE: CPT

## 2023-12-21 PROCEDURE — 93312 ECHO TRANSESOPHAGEAL: CPT

## 2023-12-21 PROCEDURE — 93325 DOPPLER ECHO COLOR FLOW MAPG: CPT | Mod: 26

## 2023-12-21 PROCEDURE — 93320 DOPPLER ECHO COMPLETE: CPT | Mod: 26

## 2023-12-21 PROCEDURE — 93312 ECHO TRANSESOPHAGEAL: CPT | Mod: 26

## 2023-12-21 PROCEDURE — 93325 DOPPLER ECHO COLOR FLOW MAPG: CPT

## 2023-12-21 PROCEDURE — 76377 3D RENDER W/INTRP POSTPROCES: CPT

## 2024-01-10 NOTE — H&P PST ADULT - TEMPERATURE IN CELSIUS (DEGREES C)
Jemal called requesting office reach out to his PCP, DR. Ace regarding a clearance before putting in a referral for a chiropractor. They will not put in referral until they get an okay from cardiology because of his recent procedure.    36.3

## 2024-01-11 ENCOUNTER — APPOINTMENT (OUTPATIENT)
Dept: OPHTHALMOLOGY | Facility: CLINIC | Age: 69
End: 2024-01-11
Payer: MEDICARE

## 2024-01-11 ENCOUNTER — NON-APPOINTMENT (OUTPATIENT)
Age: 69
End: 2024-01-11

## 2024-01-11 PROCEDURE — 92250 FUNDUS PHOTOGRAPHY W/I&R: CPT

## 2024-01-11 PROCEDURE — 92014 COMPRE OPH EXAM EST PT 1/>: CPT

## 2024-01-16 ENCOUNTER — OUTPATIENT (OUTPATIENT)
Dept: OUTPATIENT SERVICES | Facility: HOSPITAL | Age: 69
LOS: 1 days | End: 2024-01-16
Payer: MEDICARE

## 2024-01-16 VITALS
HEIGHT: 72 IN | TEMPERATURE: 97 F | WEIGHT: 227.3 LBS | OXYGEN SATURATION: 96 % | RESPIRATION RATE: 15 BRPM | DIASTOLIC BLOOD PRESSURE: 76 MMHG | SYSTOLIC BLOOD PRESSURE: 112 MMHG | HEART RATE: 65 BPM

## 2024-01-16 DIAGNOSIS — N40.1 BENIGN PROSTATIC HYPERPLASIA WITH LOWER URINARY TRACT SYMPTOMS: ICD-10-CM

## 2024-01-16 DIAGNOSIS — Z01.818 ENCOUNTER FOR OTHER PREPROCEDURAL EXAMINATION: ICD-10-CM

## 2024-01-16 DIAGNOSIS — Z95.2 PRESENCE OF PROSTHETIC HEART VALVE: Chronic | ICD-10-CM

## 2024-01-16 DIAGNOSIS — Z95.0 PRESENCE OF CARDIAC PACEMAKER: Chronic | ICD-10-CM

## 2024-01-16 DIAGNOSIS — Z98.890 OTHER SPECIFIED POSTPROCEDURAL STATES: Chronic | ICD-10-CM

## 2024-01-16 LAB
ANION GAP SERPL CALC-SCNC: 11 MMOL/L — SIGNIFICANT CHANGE UP (ref 5–17)
BLD GP AB SCN SERPL QL: NEGATIVE — SIGNIFICANT CHANGE UP
BUN SERPL-MCNC: 18 MG/DL — SIGNIFICANT CHANGE UP (ref 7–23)
CALCIUM SERPL-MCNC: 9.4 MG/DL — SIGNIFICANT CHANGE UP (ref 8.4–10.5)
CHLORIDE SERPL-SCNC: 103 MMOL/L — SIGNIFICANT CHANGE UP (ref 96–108)
CO2 SERPL-SCNC: 26 MMOL/L — SIGNIFICANT CHANGE UP (ref 22–31)
CREAT SERPL-MCNC: 0.98 MG/DL — SIGNIFICANT CHANGE UP (ref 0.5–1.3)
EGFR: 83 ML/MIN/1.73M2 — SIGNIFICANT CHANGE UP
GLUCOSE SERPL-MCNC: 100 MG/DL — HIGH (ref 70–99)
HCT VFR BLD CALC: 45.9 % — SIGNIFICANT CHANGE UP (ref 39–50)
HGB BLD-MCNC: 15.5 G/DL — SIGNIFICANT CHANGE UP (ref 13–17)
MCHC RBC-ENTMCNC: 32 PG — SIGNIFICANT CHANGE UP (ref 27–34)
MCHC RBC-ENTMCNC: 33.8 GM/DL — SIGNIFICANT CHANGE UP (ref 32–36)
MCV RBC AUTO: 94.8 FL — SIGNIFICANT CHANGE UP (ref 80–100)
NRBC # BLD: 0 /100 WBCS — SIGNIFICANT CHANGE UP (ref 0–0)
PLATELET # BLD AUTO: 148 K/UL — LOW (ref 150–400)
POTASSIUM SERPL-MCNC: 4.3 MMOL/L — SIGNIFICANT CHANGE UP (ref 3.5–5.3)
POTASSIUM SERPL-SCNC: 4.3 MMOL/L — SIGNIFICANT CHANGE UP (ref 3.5–5.3)
RBC # BLD: 4.84 M/UL — SIGNIFICANT CHANGE UP (ref 4.2–5.8)
RBC # FLD: 13.4 % — SIGNIFICANT CHANGE UP (ref 10.3–14.5)
RH IG SCN BLD-IMP: NEGATIVE — SIGNIFICANT CHANGE UP
SODIUM SERPL-SCNC: 140 MMOL/L — SIGNIFICANT CHANGE UP (ref 135–145)
WBC # BLD: 6.46 K/UL — SIGNIFICANT CHANGE UP (ref 3.8–10.5)
WBC # FLD AUTO: 6.46 K/UL — SIGNIFICANT CHANGE UP (ref 3.8–10.5)

## 2024-01-16 PROCEDURE — 80048 BASIC METABOLIC PNL TOTAL CA: CPT

## 2024-01-16 PROCEDURE — 86901 BLOOD TYPING SEROLOGIC RH(D): CPT

## 2024-01-16 PROCEDURE — 86900 BLOOD TYPING SEROLOGIC ABO: CPT

## 2024-01-16 PROCEDURE — G0463: CPT

## 2024-01-16 PROCEDURE — 86850 RBC ANTIBODY SCREEN: CPT

## 2024-01-16 PROCEDURE — 85027 COMPLETE CBC AUTOMATED: CPT

## 2024-01-16 PROCEDURE — 87086 URINE CULTURE/COLONY COUNT: CPT

## 2024-01-16 PROCEDURE — 36415 COLL VENOUS BLD VENIPUNCTURE: CPT

## 2024-01-16 RX ORDER — ASPIRIN/CALCIUM CARB/MAGNESIUM 324 MG
1 TABLET ORAL
Refills: 0 | DISCHARGE

## 2024-01-16 NOTE — H&P PST ADULT - HISTORY OF PRESENT ILLNESS
Mr. Martin is a 67 y/o man with h/o HTN, Gout, HLD, CAD, diastolic CHF, AAA (2013) s/p repair, DVT x 2 separate episodes (2005 and 2020 both episode after long  flight), s/p AVR (Bovine), s/p Medtronic PPM, Paroxysmal A-fib on Xarelto, s/p TAVR 12/5/22 and known BPH c/o frequency, weak stream, hesitancy, urgency, frequency, nocturia and straining for "few years" Pt report 2 episodes of urinary retention requiring Rivera catheter (5 years ago and Sept 2023 while on trip in [place]). Today he presents to PST for scheduled Cystoscopy Laser Enucleation of Prostate with Morcellation on 12/14/23. Denies any palpitations, SOB, N/V, fever or chills.     Mr. Martin was previously scheduled for last month but needed add'l imaging done, echo results in chart. Mr. Martin is a 69 y/o man with h/o HTN, Gout, HLD, CAD, diastolic CHF, AAA (2013) s/p repair, DVT x 2 separate episodes (2005 and 2020 both episode after long  flight), s/p AVR (Bovine), s/p Medtronic PPM, Paroxysmal A-fib on Xarelto, s/p TAVR 12/5/22 and known BPH c/o frequency, weak stream, hesitancy, urgency, frequency, nocturia and straining for "few years" Pt report 2 episodes of urinary retention requiring Rivera catheter (5 years ago and Sept 2023 while on trip in [place]). Today he presents to PST for scheduled Cystoscopy Laser Enucleation of Prostate with Morcellation on 12/14/23. Denies any palpitations, SOB, N/V, fever or chills.     Mr. Martin was previously scheduled for last month but needed add'l imaging done, echo results in chart. Mr. Martin is a 67 y/o man with h/o HTN, Gout, HLD, CAD, diastolic CHF, AAA (2013) s/p repair, DVT x 2 separate episodes (2005 and 2020 both episode after long  flight), s/p AVR (Bovine), s/p Medtronic PPM, Paroxysmal A-fib on Xarelto, s/p TAVR 12/5/22, CHARLI uses CPAP and known BPH c/o frequency, weak stream, hesitancy, urgency, frequency, nocturia and straining for "few years" Pt report 2 episodes of urinary retention requiring Rivera catheter (5 years ago and Sept 2023 while on trip in [place]). Today he presents to PST for scheduled Cystoscopy Laser Enucleation of Prostate with Morcellation on 1/30/2024. Denies any palpitations, SOB, N/V, fever or chills.     Mr. Martin was previously scheduled for last month but needed add'l imaging done, echo results in chart. Mr. Martin is a 69 y/o man with h/o HTN, Gout, HLD, CAD, diastolic CHF, AAA (2013) s/p repair, DVT x 2 separate episodes (2005 and 2020 both episode after long  flight), s/p AVR (Bovine), s/p Medtronic PPM, Paroxysmal A-fib on Xarelto, s/p TAVR 12/5/22, CHARLI uses CPAP and known BPH c/o frequency, weak stream, hesitancy, urgency, frequency, nocturia and straining for "few years" Pt report 2 episodes of urinary retention requiring Rivera catheter (5 years ago and Sept 2023 while on trip in [place]). Today he presents to PST for scheduled Cystoscopy Laser Enucleation of Prostate with Morcellation on 1/30/2024. Denies any palpitations, SOB, N/V, fever or chills.     Mr. Martin was previously scheduled for last month but needed add'l imaging done, echo results in chart.

## 2024-01-16 NOTE — H&P PST ADULT - RESPIRATORY
unknown
clear to auscultation bilaterally/airway patent/breath sounds equal/good air movement/respirations non-labored

## 2024-01-16 NOTE — H&P PST ADULT - PROBLEM SELECTOR PLAN 1
Cystoscopy Laser Enucleation of Prostate with Morcellation on 12/14/23.   Pre-op education provided - all questions answered. Pt verbalized understanding  Urine cx pending

## 2024-01-16 NOTE — H&P PST ADULT - ASSESSMENT
DASI: able to go up one flight of stairs or walk 1-2 blocks with out difficulty    Dental: denies loose teeth, no dentures has one implant

## 2024-01-16 NOTE — H&P PST ADULT - ANESTHESIA, PREVIOUS REACTION, PROFILE
[address] [facility] Adult  Hospitalist Group                                                                                    Hospitalist Progress Note  Sung Julius Minor V, NP        Date of Service:  1/10/2023  NAME:  Nelli Bowers  :  1955  MRN:  [id number]    Admission Summary:   Ms. Barby Garcia is a 69 [facility]    Cee King V NP none

## 2024-01-16 NOTE — H&P PST ADULT - PROBLEM SELECTOR PLAN 2
Cardiac Eval/Pacemaker will obtain  continue on antihypertensive medications   Hold Xarelto 3 days prior to surgery

## 2024-01-17 LAB
CULTURE RESULTS: SIGNIFICANT CHANGE UP
SPECIMEN SOURCE: SIGNIFICANT CHANGE UP

## 2024-02-05 ENCOUNTER — TRANSCRIPTION ENCOUNTER (OUTPATIENT)
Age: 69
End: 2024-02-05

## 2024-02-06 ENCOUNTER — APPOINTMENT (OUTPATIENT)
Dept: UROLOGY | Facility: HOSPITAL | Age: 69
End: 2024-02-06

## 2024-02-06 ENCOUNTER — INPATIENT (INPATIENT)
Facility: HOSPITAL | Age: 69
LOS: 0 days | Discharge: HOME | DRG: 714 | End: 2024-02-07
Attending: UROLOGY | Admitting: UROLOGY
Payer: COMMERCIAL

## 2024-02-06 ENCOUNTER — RESULT REVIEW (OUTPATIENT)
Age: 69
End: 2024-02-06

## 2024-02-06 VITALS
DIASTOLIC BLOOD PRESSURE: 82 MMHG | OXYGEN SATURATION: 96 % | TEMPERATURE: 98 F | HEART RATE: 73 BPM | SYSTOLIC BLOOD PRESSURE: 123 MMHG | RESPIRATION RATE: 20 BRPM | WEIGHT: 227.3 LBS

## 2024-02-06 DIAGNOSIS — Z98.890 OTHER SPECIFIED POSTPROCEDURAL STATES: Chronic | ICD-10-CM

## 2024-02-06 DIAGNOSIS — Z95.0 PRESENCE OF CARDIAC PACEMAKER: Chronic | ICD-10-CM

## 2024-02-06 DIAGNOSIS — N40.1 BENIGN PROSTATIC HYPERPLASIA WITH LOWER URINARY TRACT SYMPTOMS: ICD-10-CM

## 2024-02-06 DIAGNOSIS — Z95.2 PRESENCE OF PROSTHETIC HEART VALVE: Chronic | ICD-10-CM

## 2024-02-06 LAB
ANION GAP SERPL CALC-SCNC: 9 MMOL/L — SIGNIFICANT CHANGE UP (ref 5–17)
BASOPHILS # BLD AUTO: 0.03 K/UL — SIGNIFICANT CHANGE UP (ref 0–0.2)
BASOPHILS NFR BLD AUTO: 0.3 % — SIGNIFICANT CHANGE UP (ref 0–2)
BUN SERPL-MCNC: 14 MG/DL — SIGNIFICANT CHANGE UP (ref 7–23)
CALCIUM SERPL-MCNC: 7.5 MG/DL — LOW (ref 8.4–10.5)
CHLORIDE SERPL-SCNC: 110 MMOL/L — HIGH (ref 96–108)
CO2 SERPL-SCNC: 21 MMOL/L — LOW (ref 22–31)
CREAT SERPL-MCNC: 0.87 MG/DL — SIGNIFICANT CHANGE UP (ref 0.5–1.3)
EGFR: 93 ML/MIN/1.73M2 — SIGNIFICANT CHANGE UP
EOSINOPHIL # BLD AUTO: 0.01 K/UL — SIGNIFICANT CHANGE UP (ref 0–0.5)
EOSINOPHIL NFR BLD AUTO: 0.1 % — SIGNIFICANT CHANGE UP (ref 0–6)
GLUCOSE SERPL-MCNC: 179 MG/DL — HIGH (ref 70–99)
HCT VFR BLD CALC: 37 % — LOW (ref 39–50)
HGB BLD-MCNC: 12.3 G/DL — LOW (ref 13–17)
IMM GRANULOCYTES NFR BLD AUTO: 0.5 % — SIGNIFICANT CHANGE UP (ref 0–0.9)
LYMPHOCYTES # BLD AUTO: 0.66 K/UL — LOW (ref 1–3.3)
LYMPHOCYTES # BLD AUTO: 7.7 % — LOW (ref 13–44)
MCHC RBC-ENTMCNC: 31.9 PG — SIGNIFICANT CHANGE UP (ref 27–34)
MCHC RBC-ENTMCNC: 33.2 GM/DL — SIGNIFICANT CHANGE UP (ref 32–36)
MCV RBC AUTO: 96.1 FL — SIGNIFICANT CHANGE UP (ref 80–100)
MONOCYTES # BLD AUTO: 0.13 K/UL — SIGNIFICANT CHANGE UP (ref 0–0.9)
MONOCYTES NFR BLD AUTO: 1.5 % — LOW (ref 2–14)
NEUTROPHILS # BLD AUTO: 7.74 K/UL — HIGH (ref 1.8–7.4)
NEUTROPHILS NFR BLD AUTO: 89.9 % — HIGH (ref 43–77)
NRBC # BLD: 0 /100 WBCS — SIGNIFICANT CHANGE UP (ref 0–0)
PLATELET # BLD AUTO: 126 K/UL — LOW (ref 150–400)
POTASSIUM SERPL-MCNC: 4.8 MMOL/L — SIGNIFICANT CHANGE UP (ref 3.5–5.3)
POTASSIUM SERPL-SCNC: 4.8 MMOL/L — SIGNIFICANT CHANGE UP (ref 3.5–5.3)
RBC # BLD: 3.85 M/UL — LOW (ref 4.2–5.8)
RBC # FLD: 13.9 % — SIGNIFICANT CHANGE UP (ref 10.3–14.5)
SODIUM SERPL-SCNC: 140 MMOL/L — SIGNIFICANT CHANGE UP (ref 135–145)
WBC # BLD: 8.61 K/UL — SIGNIFICANT CHANGE UP (ref 3.8–10.5)
WBC # FLD AUTO: 8.61 K/UL — SIGNIFICANT CHANGE UP (ref 3.8–10.5)

## 2024-02-06 PROCEDURE — 52649 PROSTATE LASER ENUCLEATION: CPT

## 2024-02-06 PROCEDURE — 88305 TISSUE EXAM BY PATHOLOGIST: CPT | Mod: 26

## 2024-02-06 DEVICE — LASER FIBER SOLTIVE 550: Type: IMPLANTABLE DEVICE | Status: FUNCTIONAL

## 2024-02-06 DEVICE — MOCELLATOR ROTATION SINGLEUSE 4.75: Type: IMPLANTABLE DEVICE | Status: FUNCTIONAL

## 2024-02-06 RX ORDER — ONDANSETRON 8 MG/1
4 TABLET, FILM COATED ORAL ONCE
Refills: 0 | Status: DISCONTINUED | OUTPATIENT
Start: 2024-02-06 | End: 2024-02-06

## 2024-02-06 RX ORDER — CIPROFLOXACIN LACTATE 400MG/40ML
400 VIAL (ML) INTRAVENOUS EVERY 12 HOURS
Refills: 0 | Status: DISCONTINUED | OUTPATIENT
Start: 2024-02-06 | End: 2024-02-07

## 2024-02-06 RX ORDER — HEPARIN SODIUM 5000 [USP'U]/ML
5000 INJECTION INTRAVENOUS; SUBCUTANEOUS EVERY 8 HOURS
Refills: 0 | Status: DISCONTINUED | OUTPATIENT
Start: 2024-02-06 | End: 2024-02-07

## 2024-02-06 RX ORDER — FUROSEMIDE 40 MG
10 TABLET ORAL ONCE
Refills: 0 | Status: DISCONTINUED | OUTPATIENT
Start: 2024-02-06 | End: 2024-02-06

## 2024-02-06 RX ORDER — CEFAZOLIN SODIUM 1 G
2000 VIAL (EA) INJECTION ONCE
Refills: 0 | Status: COMPLETED | OUTPATIENT
Start: 2024-02-06 | End: 2024-02-06

## 2024-02-06 RX ORDER — OXYCODONE HYDROCHLORIDE 5 MG/1
10 TABLET ORAL EVERY 6 HOURS
Refills: 0 | Status: DISCONTINUED | OUTPATIENT
Start: 2024-02-06 | End: 2024-02-07

## 2024-02-06 RX ORDER — SODIUM CHLORIDE 9 MG/ML
1000 INJECTION, SOLUTION INTRAVENOUS
Refills: 0 | Status: DISCONTINUED | OUTPATIENT
Start: 2024-02-06 | End: 2024-02-07

## 2024-02-06 RX ORDER — ATORVASTATIN CALCIUM 80 MG/1
20 TABLET, FILM COATED ORAL AT BEDTIME
Refills: 0 | Status: DISCONTINUED | OUTPATIENT
Start: 2024-02-06 | End: 2024-02-07

## 2024-02-06 RX ORDER — OXYCODONE HYDROCHLORIDE 5 MG/1
5 TABLET ORAL EVERY 4 HOURS
Refills: 0 | Status: DISCONTINUED | OUTPATIENT
Start: 2024-02-06 | End: 2024-02-07

## 2024-02-06 RX ORDER — TAMSULOSIN HYDROCHLORIDE 0.4 MG/1
1 CAPSULE ORAL
Qty: 0 | Refills: 0 | DISCHARGE

## 2024-02-06 RX ORDER — SODIUM CHLORIDE 9 MG/ML
3 INJECTION INTRAMUSCULAR; INTRAVENOUS; SUBCUTANEOUS EVERY 8 HOURS
Refills: 0 | Status: DISCONTINUED | OUTPATIENT
Start: 2024-02-06 | End: 2024-02-06

## 2024-02-06 RX ORDER — FINASTERIDE 5 MG/1
1 TABLET, FILM COATED ORAL
Qty: 0 | Refills: 0 | DISCHARGE

## 2024-02-06 RX ORDER — INFLUENZA VIRUS VACCINE 15; 15; 15; 15 UG/.5ML; UG/.5ML; UG/.5ML; UG/.5ML
0.7 SUSPENSION INTRAMUSCULAR ONCE
Refills: 0 | Status: DISCONTINUED | OUTPATIENT
Start: 2024-02-06 | End: 2024-02-07

## 2024-02-06 RX ORDER — HYDROMORPHONE HYDROCHLORIDE 2 MG/ML
0.5 INJECTION INTRAMUSCULAR; INTRAVENOUS; SUBCUTANEOUS
Refills: 0 | Status: DISCONTINUED | OUTPATIENT
Start: 2024-02-06 | End: 2024-02-06

## 2024-02-06 RX ORDER — CHOLECALCIFEROL (VITAMIN D3) 125 MCG
1 CAPSULE ORAL
Qty: 0 | Refills: 0 | DISCHARGE

## 2024-02-06 RX ORDER — BUMETANIDE 0.25 MG/ML
1 INJECTION INTRAMUSCULAR; INTRAVENOUS DAILY
Refills: 0 | Status: DISCONTINUED | OUTPATIENT
Start: 2024-02-06 | End: 2024-02-07

## 2024-02-06 RX ORDER — ASCORBIC ACID 60 MG
1 TABLET,CHEWABLE ORAL
Qty: 0 | Refills: 0 | DISCHARGE

## 2024-02-06 RX ORDER — METOPROLOL TARTRATE 50 MG
50 TABLET ORAL DAILY
Refills: 0 | Status: DISCONTINUED | OUTPATIENT
Start: 2024-02-06 | End: 2024-02-07

## 2024-02-06 RX ORDER — ACETAMINOPHEN 500 MG
975 TABLET ORAL EVERY 6 HOURS
Refills: 0 | Status: DISCONTINUED | OUTPATIENT
Start: 2024-02-06 | End: 2024-02-07

## 2024-02-06 RX ORDER — LIDOCAINE HCL 20 MG/ML
0.2 VIAL (ML) INJECTION ONCE
Refills: 0 | Status: DISCONTINUED | OUTPATIENT
Start: 2024-02-06 | End: 2024-02-06

## 2024-02-06 RX ADMIN — HEPARIN SODIUM 5000 UNIT(S): 5000 INJECTION INTRAVENOUS; SUBCUTANEOUS at 18:51

## 2024-02-06 RX ADMIN — HEPARIN SODIUM 5000 UNIT(S): 5000 INJECTION INTRAVENOUS; SUBCUTANEOUS at 21:11

## 2024-02-06 RX ADMIN — ATORVASTATIN CALCIUM 20 MILLIGRAM(S): 80 TABLET, FILM COATED ORAL at 21:11

## 2024-02-06 RX ADMIN — Medication 200 MILLIGRAM(S): at 21:11

## 2024-02-06 NOTE — PRE-ANESTHESIA EVALUATION ADULT - NSANTHPMHFT_GEN_ALL_CORE
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

## 2024-02-06 NOTE — PATIENT PROFILE ADULT - FALL HARM RISK - HARM RISK INTERVENTIONS

## 2024-02-06 NOTE — PATIENT PROFILE ADULT - PRO INTERPRETER NEED 2
Elsa Sarah returns today with continued symptoms affecting the right ring.  The patient would like to move forward with the planned in office trigger finger release.  Risks were once again discussed.  The patient took their antibiotic 1 hour prior to arrival.       PREOPERATIVE DIAGNOSIS:  Right ring trigger finger.     POSTOPERATIVE DIAGNOSIS:  Same.    PROCEDURE PERFORMED:  Right ring A1 pulley release.    SURGEON:  Bipin Bernstein DO.    ANESTHESIA:   Lidocaine 1% 1:100,000 epi, 5 mL.    COMPLICATIONS:  None.    SPECIMENS:  None.    The patient tolerated the procedure well.  Operative findings consistent with trigger finger, uncomplicated release of the A1 pulley.    BRIEF OPERATIVE INDICATIONS:  The patient has failed conservative therapy and elects to undergo an A1 pulley release.  The patient is aware of all the risks and benefits, including neurovascular injury, need for further surgery and continued triggering and soft tissue nonhealing.  The patient elected to undergo the above procedure.    OPERATIVE COURSE:  The patient was identified in the holding area.  Correct extremity was marked with initials.  Prior to prepping, the affected digit was injected with 5 mL of 1% lidocaine with epinephrine at the volar MCP area.  This injection was allowed to sit for 15-20 minutes.  The patient was brought back to the operative suite.   The patient performed a surgical scrub of the hand with chlorhexidine scrub brush and soap.  Patient was placed in the supine position on the operative suite table.  The opposite hand was placed on the outstretched hand table.  Upper extremity was prepped and draped in normal sterile fashion.  A longitudinal incision was made down through the skin and subcutaneous tissue.  A tenotomy scissors was used to bluntly spread the underlying palmar fascia and fat at the base of the digit.  Ragnell retractors were then used to expose the A-1 pulley.  A 15 blade scalpel was used to incise the A1  pulley longitudinally.  Care was kept not to cut into the tendons themselves.  The tenotomy scissors were then used to release up to the A2 pulley and the remaining proximal portion of the A1 pulley.  The tendon was then brought up out of the wound for inspection of any areas of abnormalities.  The wound was copiously irrigated.  The patient was instructed to make a full fist.  There was no triggering present and full range of motion of the finger.  The wound was then copiously irrigated again and closed with 4-0 nylon sutures in a horizontal mattress fashion.  A Coban wrap and 4 x 4s, along with some Xeroform were used to dress the wound.    POSTOPERATIVE COURSE:  We will see the patient back in the office in approximately 2 weeks.  The patient is allowed to remove the dressing in 5 days and shower.  The patient will place a Band-Aid over it at that time.  Pain medication was given.     English

## 2024-02-07 ENCOUNTER — TRANSCRIPTION ENCOUNTER (OUTPATIENT)
Age: 69
End: 2024-02-07

## 2024-02-07 VITALS
RESPIRATION RATE: 18 BRPM | TEMPERATURE: 98 F | SYSTOLIC BLOOD PRESSURE: 104 MMHG | HEART RATE: 62 BPM | DIASTOLIC BLOOD PRESSURE: 68 MMHG | OXYGEN SATURATION: 92 %

## 2024-02-07 LAB
ANION GAP SERPL CALC-SCNC: 7 MMOL/L — SIGNIFICANT CHANGE UP (ref 5–17)
BASOPHILS # BLD AUTO: 0.01 K/UL — SIGNIFICANT CHANGE UP (ref 0–0.2)
BASOPHILS NFR BLD AUTO: 0.1 % — SIGNIFICANT CHANGE UP (ref 0–2)
BUN SERPL-MCNC: 15 MG/DL — SIGNIFICANT CHANGE UP (ref 7–23)
CALCIUM SERPL-MCNC: 8.1 MG/DL — LOW (ref 8.4–10.5)
CHLORIDE SERPL-SCNC: 107 MMOL/L — SIGNIFICANT CHANGE UP (ref 96–108)
CO2 SERPL-SCNC: 26 MMOL/L — SIGNIFICANT CHANGE UP (ref 22–31)
CREAT SERPL-MCNC: 0.93 MG/DL — SIGNIFICANT CHANGE UP (ref 0.5–1.3)
EGFR: 88 ML/MIN/1.73M2 — SIGNIFICANT CHANGE UP
EOSINOPHIL # BLD AUTO: 0.01 K/UL — SIGNIFICANT CHANGE UP (ref 0–0.5)
EOSINOPHIL NFR BLD AUTO: 0.1 % — SIGNIFICANT CHANGE UP (ref 0–6)
GLUCOSE SERPL-MCNC: 106 MG/DL — HIGH (ref 70–99)
HCT VFR BLD CALC: 36.7 % — LOW (ref 39–50)
HGB BLD-MCNC: 11.7 G/DL — LOW (ref 13–17)
IMM GRANULOCYTES NFR BLD AUTO: 0.5 % — SIGNIFICANT CHANGE UP (ref 0–0.9)
LYMPHOCYTES # BLD AUTO: 1.18 K/UL — SIGNIFICANT CHANGE UP (ref 1–3.3)
LYMPHOCYTES # BLD AUTO: 10.6 % — LOW (ref 13–44)
MCHC RBC-ENTMCNC: 31.4 PG — SIGNIFICANT CHANGE UP (ref 27–34)
MCHC RBC-ENTMCNC: 31.9 GM/DL — LOW (ref 32–36)
MCV RBC AUTO: 98.4 FL — SIGNIFICANT CHANGE UP (ref 80–100)
MONOCYTES # BLD AUTO: 0.84 K/UL — SIGNIFICANT CHANGE UP (ref 0–0.9)
MONOCYTES NFR BLD AUTO: 7.6 % — SIGNIFICANT CHANGE UP (ref 2–14)
NEUTROPHILS # BLD AUTO: 8.99 K/UL — HIGH (ref 1.8–7.4)
NEUTROPHILS NFR BLD AUTO: 81.1 % — HIGH (ref 43–77)
NRBC # BLD: 0 /100 WBCS — SIGNIFICANT CHANGE UP (ref 0–0)
PLATELET # BLD AUTO: 125 K/UL — LOW (ref 150–400)
POTASSIUM SERPL-MCNC: 4.1 MMOL/L — SIGNIFICANT CHANGE UP (ref 3.5–5.3)
POTASSIUM SERPL-SCNC: 4.1 MMOL/L — SIGNIFICANT CHANGE UP (ref 3.5–5.3)
RBC # BLD: 3.73 M/UL — LOW (ref 4.2–5.8)
RBC # FLD: 14.1 % — SIGNIFICANT CHANGE UP (ref 10.3–14.5)
SODIUM SERPL-SCNC: 140 MMOL/L — SIGNIFICANT CHANGE UP (ref 135–145)
WBC # BLD: 11.09 K/UL — HIGH (ref 3.8–10.5)
WBC # FLD AUTO: 11.09 K/UL — HIGH (ref 3.8–10.5)

## 2024-02-07 PROCEDURE — 85025 COMPLETE CBC W/AUTO DIFF WBC: CPT

## 2024-02-07 PROCEDURE — 52649 PROSTATE LASER ENUCLEATION: CPT

## 2024-02-07 PROCEDURE — C9399: CPT

## 2024-02-07 PROCEDURE — C1889: CPT

## 2024-02-07 PROCEDURE — 80048 BASIC METABOLIC PNL TOTAL CA: CPT

## 2024-02-07 PROCEDURE — C1782: CPT

## 2024-02-07 PROCEDURE — 88305 TISSUE EXAM BY PATHOLOGIST: CPT

## 2024-02-07 RX ORDER — CIPROFLOXACIN LACTATE 400MG/40ML
1 VIAL (ML) INTRAVENOUS
Qty: 6 | Refills: 0
Start: 2024-02-07 | End: 2024-02-09

## 2024-02-07 RX ORDER — ACETAMINOPHEN 500 MG
2 TABLET ORAL
Qty: 0 | Refills: 0 | DISCHARGE
Start: 2024-02-07

## 2024-02-07 RX ADMIN — Medication 10 MILLIGRAM(S): at 06:08

## 2024-02-07 RX ADMIN — Medication 50 MILLIGRAM(S): at 06:09

## 2024-02-07 RX ADMIN — HEPARIN SODIUM 5000 UNIT(S): 5000 INJECTION INTRAVENOUS; SUBCUTANEOUS at 06:09

## 2024-02-07 RX ADMIN — BUMETANIDE 1 MILLIGRAM(S): 0.25 INJECTION INTRAMUSCULAR; INTRAVENOUS at 06:09

## 2024-02-07 RX ADMIN — Medication 200 MILLIGRAM(S): at 08:52

## 2024-02-07 NOTE — DISCHARGE NOTE PROVIDER - NSDCMRMEDTOKEN_GEN_ALL_CORE_FT
acetaminophen 325 mg oral tablet: 2 tab(s) orally every 6 hours as needed for  mild pain  atorvastatin 20 mg oral tablet: 1 tab(s) orally once a day (at bedtime)  bumetanide 1 mg oral tablet: 1 tab(s) orally every other day  finasteride 5 mg oral tablet: 1 tab(s) orally once a day  metoprolol succinate 50 mg oral tablet, extended release: 1 tab(s) orally once a day  rivaroxaban 20 mg oral tablet: 1 tab(s) orally once a day (before a meal)  tamsulosin 0.4 mg oral capsule: 1 cap(s) orally 2 times a day  Vitamin C 1000 mg oral tablet: 1 tab(s) orally once a day  Vitamin D3 1000 intl units oral tablet: 1 tab(s) orally once a day   acetaminophen 325 mg oral tablet: 2 tab(s) orally every 6 hours as needed for  mild pain  atorvastatin 20 mg oral tablet: 1 tab(s) orally once a day (at bedtime)  bumetanide 1 mg oral tablet: 1 tab(s) orally every other day  Cipro 500 mg oral tablet: 1 tab(s) orally 2 times a day  finasteride 5 mg oral tablet: 1 tab(s) orally once a day  metoprolol succinate 50 mg oral tablet, extended release: 1 tab(s) orally once a day  rivaroxaban 20 mg oral tablet: 1 tab(s) orally once a day (before a meal)  tamsulosin 0.4 mg oral capsule: 1 cap(s) orally 2 times a day  Vitamin C 1000 mg oral tablet: 1 tab(s) orally once a day  Vitamin D3 1000 intl units oral tablet: 1 tab(s) orally once a day

## 2024-02-07 NOTE — PROVIDER CONTACT NOTE (OTHER) - ASSESSMENT
Pt AXO4 and VSS. Pt denies pain, HA, SOB, CP, difficulty breathing.
pt voiding independently, blood tinged urine. voided 200ml, .

## 2024-02-07 NOTE — PROGRESS NOTE ADULT - SUBJECTIVE AND OBJECTIVE BOX
Post op Check    Pt seen and examined without complaints. Pain is controlled. Denies SOB/CP/N/V.     Vital Signs Last 24 Hrs  T(C): 36.3 (06 Feb 2024 13:25), Max: 36.4 (06 Feb 2024 08:02)  T(F): 97.3 (06 Feb 2024 13:25), Max: 97.5 (06 Feb 2024 08:02)  HR: 60 (06 Feb 2024 16:00) (59 - 73)  BP: 119/57 (06 Feb 2024 16:00) (104/67 - 123/82)  BP(mean): 82 (06 Feb 2024 16:00) (77 - 87)  RR: 16 (06 Feb 2024 16:00) (16 - 20)  SpO2: 94% (06 Feb 2024 16:00) (93% - 97%)    Parameters below as of 06 Feb 2024 15:15  Patient On (Oxygen Delivery Method): room air        I&O's Summary      Physical Exam  Gen: NAD  Abd: Soft, NT, ND  Back: no cvat b/l  : 3way crystal in place, +CBI, urine clear                          12.3   8.61  )-----------( 126      ( 06 Feb 2024 14:16 )             37.0       02-06    140  |  110<H>  |  14  ----------------------------<  179<H>  4.8   |  21<L>  |  0.87    Ca    7.5<L>      06 Feb 2024 14:16        A/P: 68y Male s/p ThuLEP    -DVT prophylaxis/OOB  -Incentive spirometry  -Analgesia and antiemetics as needed  -Diet  -AM labs  -CBI until am  -likely TOV tomorrow and home
UROLOGY DAILY PROGRESS NOTE:     Subjective: Patient seen and examined at bedside. No overnight events.       Objective:  Vital signs  T(F): , Max: 98.5 (02-06-24 @ 21:15)  HR: 63 (02-07-24 @ 05:43)  BP: 113/72 (02-07-24 @ 05:43)  SpO2: 95% (02-07-24 @ 05:43)  Wt(kg): --    I&O's Summary    06 Feb 2024 07:01  -  07 Feb 2024 07:00  --------------------------------------------------------  IN: 1360 mL / OUT: 0 mL / NET: 1360 mL        Gen: NAD  Pulm: No respiratory distress, no subcostal retractions  CV: RRR, no JVD  Abd: Soft, NT, ND  : CBi off- urine clear    Labs:  02-07  11.09 / 36.7  /x      02-06  8.61  / 37.0  /0.87                           11.7   11.09 )-----------( 125      ( 07 Feb 2024 08:11 )             36.7     02-06    140  |  110<H>  |  14  ----------------------------<  179<H>  4.8   |  21<L>  |  0.87    Ca    7.5<L>      06 Feb 2024 14:16          Urine Cx:          A/P: 68y Male s/p ThuLEP    -DVT prophylaxis/OOB  -Incentive spirometry  -Analgesia and antiemetics as needed  -Diet  -AM labs  -TOV  - dc home today

## 2024-02-07 NOTE — DISCHARGE NOTE PROVIDER - NSDCCPCAREPLAN_GEN_ALL_CORE_FT
PRINCIPAL DISCHARGE DIAGNOSIS  Diagnosis: BPH (benign prostatic hyperplasia)  Assessment and Plan of Treatment: You had a procedure on your prostate to help you urinate.  Some blood is expected in the urine; drink fluids to help clear it.  Take the antibiotics as prescribed and follow up with Dr Schultz within 2 weeks.  Notify the office if you develop any fevers of 100.8F or greater, worsening bleeding in the urine, worsening difficulty urinating, or intractabla pain/nausea/vomiting.  **eliquis plan**      SECONDARY DISCHARGE DIAGNOSES  Diagnosis: HTN (hypertension)  Assessment and Plan of Treatment: Take your blood pressure medications daily and follow up routinely with your primary care doctor.      Diagnosis: HLD (hyperlipidemia)  Assessment and Plan of Treatment: Continue taking your cholesterol medications and follow up routinely with your primary care doctor.      Diagnosis: CAD (coronary artery disease)  Assessment and Plan of Treatment: Follow up routinely with your cardiologist    Diagnosis: Chronic CHF  Assessment and Plan of Treatment: Follow up routinely with your cardiologist    Diagnosis: Atrial fibrillation  Assessment and Plan of Treatment: **eliquis plan    Diagnosis: History of DVT (deep vein thrombosis)  Assessment and Plan of Treatment: **eliquis plan     PRINCIPAL DISCHARGE DIAGNOSIS  Diagnosis: BPH (benign prostatic hyperplasia)  Assessment and Plan of Treatment: You had a procedure on your prostate to help you urinate.  Some blood is expected in the urine; drink fluids to help clear it.  Take the antibiotics as prescribed and follow up with Dr Schultz within 2 weeks.  Notify the office if you develop any fevers of 100.8F or greater, worsening bleeding in the urine, worsening difficulty urinating, or intractabla pain/nausea/vomiting.  **you may restart eliquis on Friday 2/9**      SECONDARY DISCHARGE DIAGNOSES  Diagnosis: HTN (hypertension)  Assessment and Plan of Treatment: Take your blood pressure medications daily and follow up routinely with your primary care doctor.      Diagnosis: HLD (hyperlipidemia)  Assessment and Plan of Treatment: Continue taking your cholesterol medications and follow up routinely with your primary care doctor.      Diagnosis: CAD (coronary artery disease)  Assessment and Plan of Treatment: Follow up routinely with your cardiologist    Diagnosis: Chronic CHF  Assessment and Plan of Treatment: Follow up routinely with your cardiologist    Diagnosis: Atrial fibrillation  Assessment and Plan of Treatment: **you may restart eliquis on Friday 2/9**  Follow up routinely with your cardiologist    Diagnosis: History of DVT (deep vein thrombosis)  Assessment and Plan of Treatment: **you may restart eliquis on Friday 2/9**     PRINCIPAL DISCHARGE DIAGNOSIS  Diagnosis: BPH (benign prostatic hyperplasia)  Assessment and Plan of Treatment: You had a procedure on your prostate to help you urinate.  Some blood is expected in the urine; drink fluids to help clear it.  Continue taking flomax and finasteride daily.  Take the antibiotics as prescribed and follow up with Dr Schultz within 2 weeks.  Notify the office if you develop any fevers of 100.8F or greater, worsening bleeding in the urine, worsening difficulty urinating, or intractabla pain/nausea/vomiting.  **you may restart eliquis on Friday 2/9**      SECONDARY DISCHARGE DIAGNOSES  Diagnosis: HTN (hypertension)  Assessment and Plan of Treatment: Take your blood pressure medications daily and follow up routinely with your primary care doctor.      Diagnosis: HLD (hyperlipidemia)  Assessment and Plan of Treatment: Continue taking your cholesterol medications and follow up routinely with your primary care doctor.      Diagnosis: CAD (coronary artery disease)  Assessment and Plan of Treatment: Follow up routinely with your cardiologist    Diagnosis: Chronic CHF  Assessment and Plan of Treatment: Follow up routinely with your cardiologist    Diagnosis: Atrial fibrillation  Assessment and Plan of Treatment: **you may restart eliquis on Friday 2/9**  Follow up routinely with your cardiologist    Diagnosis: History of DVT (deep vein thrombosis)  Assessment and Plan of Treatment: **you may restart eliquis on Friday 2/9**

## 2024-02-07 NOTE — PROGRESS NOTE ADULT - ATTENDING COMMENTS
POD#1 s/p ThuLEP  urine clear with CBI clmaped  labs reviewed  Rivera catheter removed  Check PVR after voiding

## 2024-02-07 NOTE — DISCHARGE NOTE PROVIDER - CARE PROVIDERS DIRECT ADDRESSES
[email or web address]

## 2024-02-07 NOTE — DISCHARGE NOTE PROVIDER - HOSPITAL COURSE
This is a 68 year old male who underwent a scheduled laser enucleation of the prostate.  He remained hemodynamically stable throughout his admission and did not require any blood products.  POD#1, his CBI was clamped and his urine color remained clear afterwards.  His catheter was removed and he was able to void well without retention.    Prior to discharge, the patient was ambulating, tolerating a regular diet, and was pain controlled.

## 2024-02-07 NOTE — PROVIDER CONTACT NOTE (OTHER) - ACTION/TREATMENT ORDERED:
Provider notified.
SUNITHA Burch made aware, to bladder scan again after next void, encourage hydration.

## 2024-02-07 NOTE — DISCHARGE NOTE NURSING/CASE MANAGEMENT/SOCIAL WORK - PATIENT PORTAL LINK FT
You can access the FollowMyHealth Patient Portal offered by [facility] by registering at the following website: [email or web address]. By joining [facility]’s FollowMyHealth portal, you will also be able to view your health information using other applications (apps) compatible with our system.

## 2024-02-07 NOTE — DISCHARGE NOTE PROVIDER - CARE PROVIDER_API CALL
Pat Schultz  Urology  [address] [place] [id number]  Phone: [phone]  Fax: [phone]  Follow Up Time: 2 weeks

## 2024-02-12 LAB — SURGICAL PATHOLOGY STUDY: SIGNIFICANT CHANGE UP

## 2024-02-21 ENCOUNTER — APPOINTMENT (OUTPATIENT)
Dept: UROLOGY | Facility: CLINIC | Age: 69
End: 2024-02-21
Payer: MEDICARE

## 2024-02-21 VITALS — HEART RATE: 78 BPM | DIASTOLIC BLOOD PRESSURE: 74 MMHG | SYSTOLIC BLOOD PRESSURE: 120 MMHG

## 2024-02-21 PROCEDURE — 99024 POSTOP FOLLOW-UP VISIT: CPT

## 2024-02-23 NOTE — HISTORY OF PRESENT ILLNESS
[FreeTextEntry1] : Status post thulium laser prostate enucleation on February 6, 2024 Pathology   77 g benign  Voiding well with strong stream No incontinence Hematuria resolved No dysuria He is very happy with results of surgery  PVR minimal

## 2024-03-11 ENCOUNTER — NON-APPOINTMENT (OUTPATIENT)
Age: 69
End: 2024-03-11

## 2024-06-05 ENCOUNTER — APPOINTMENT (OUTPATIENT)
Dept: UROLOGY | Facility: CLINIC | Age: 69
End: 2024-06-05
Payer: MEDICARE

## 2024-06-05 VITALS
DIASTOLIC BLOOD PRESSURE: 75 MMHG | HEART RATE: 82 BPM | RESPIRATION RATE: 17 BRPM | TEMPERATURE: 98.4 F | SYSTOLIC BLOOD PRESSURE: 114 MMHG | HEIGHT: 72 IN

## 2024-06-05 PROCEDURE — 99214 OFFICE O/P EST MOD 30 MIN: CPT

## 2024-06-05 NOTE — PHYSICAL EXAM
[General Appearance - Well Developed] : well developed [] : no respiratory distress [Urinary Bladder Findings] : the bladder was normal on palpation [Normal Station and Gait] : the gait and station were normal for the patient's age [Oriented To Time, Place, And Person] : oriented to person, place, and time

## 2024-06-05 NOTE — ASSESSMENT
[FreeTextEntry1] : Urge incontinence Start Kegel exercises   ED Start Tadalafil 20 mg- one hour before intercourse on an empty stomach.   PSA today  Plan PSA yearly Uroflow and PVR next visit f/u 6 months

## 2024-06-05 NOTE — HISTORY OF PRESENT ILLNESS
[Urinary Incontinence] : urinary incontinence [Erectile Dysfunction] : Erectile Dysfunction [None] : None [FreeTextEntry1] : Status post thulium laser prostate enucleation on February 6, 2024 Pathology   77 g benign  Voiding well with strong stream Occasional incontinence (a drip here and there, not everyday) one pad per day he was continent at last visit  He is complaining of ED worse than before surgery. He had baseline ED prior to surgery No morning erections  Not sufficient for intercourse   Uroflow voided volume- 27 ml   PVR - 10 ml

## 2024-06-06 LAB — PSA SERPL-MCNC: 0.09 NG/ML

## 2024-06-06 RX ORDER — TADALAFIL 20 MG/1
20 TABLET ORAL DAILY
Qty: 10 | Refills: 5 | Status: ACTIVE | COMMUNITY
Start: 2024-06-05 | End: 1900-01-01

## 2024-06-17 ENCOUNTER — APPOINTMENT (OUTPATIENT)
Dept: UROLOGY | Facility: CLINIC | Age: 69
End: 2024-06-17
Payer: MEDICARE

## 2024-06-17 VITALS
RESPIRATION RATE: 17 BRPM | HEART RATE: 63 BPM | BODY MASS INDEX: 30.48 KG/M2 | OXYGEN SATURATION: 96 % | SYSTOLIC BLOOD PRESSURE: 114 MMHG | DIASTOLIC BLOOD PRESSURE: 76 MMHG | WEIGHT: 225 LBS | TEMPERATURE: 98.2 F | HEIGHT: 72 IN

## 2024-06-17 DIAGNOSIS — N13.8 BENIGN PROSTATIC HYPERPLASIA WITH LOWER URINARY TRACT SYMPMS: ICD-10-CM

## 2024-06-17 DIAGNOSIS — N52.9 MALE ERECTILE DYSFUNCTION, UNSPECIFIED: ICD-10-CM

## 2024-06-17 DIAGNOSIS — N40.1 BENIGN PROSTATIC HYPERPLASIA WITH LOWER URINARY TRACT SYMPMS: ICD-10-CM

## 2024-06-17 PROCEDURE — 99214 OFFICE O/P EST MOD 30 MIN: CPT

## 2024-06-17 RX ORDER — PAPAVERINE HYDROCHLORIDE 30 MG/ML
30 INJECTION, SOLUTION INTRAVENOUS
Qty: 2 | Refills: 0 | Status: ACTIVE | COMMUNITY
Start: 2024-06-17 | End: 1900-01-01

## 2024-06-19 NOTE — PHYSICAL EXAM
[Urethral Meatus] : meatus normal [Penis Abnormality] : normal circumcised penis [Scrotum] : the scrotum was normal [Rectal Exam - Seminal Vesicles] : the seminal vesicles were normal [Epididymis] : the epididymides were normal [Testes Tenderness] : no tenderness of the testes [Prostate Tenderness] : the prostate was not tender [No Prostate Nodules] : no prostate nodules [TextEntry] : nad ncat abd nd nt

## 2024-06-19 NOTE — HISTORY OF PRESENT ILLNESS
[FreeTextEntry1] : 68 year old male presents to office with c/o erectile dysfunction which he said started  after thulium laser prostate enucleation on February 6, 2024. He is complaining of ED worse than before surgery however he reports baseline ED prior to surgery. Patient states that he is on tadalafil 20mg with no effect.

## 2024-07-15 ENCOUNTER — APPOINTMENT (OUTPATIENT)
Dept: UROLOGY | Facility: CLINIC | Age: 69
End: 2024-07-15

## 2024-10-29 ENCOUNTER — NON-APPOINTMENT (OUTPATIENT)
Age: 69
End: 2024-10-29

## 2024-11-08 NOTE — H&P ADULT - PATIENT'S GENDER IDENTITY
Patient follows up yearly.  Was seen this June and is scheduled for next July. Ok to refill which I did.   Male

## 2024-12-04 NOTE — H&P PST ADULT - HEIGHT IN CM
Detail Level: Simple Instructions: This plan will send the code FBSE to the PM system.  DO NOT or CHANGE the price. Price (Do Not Change): 0.00 [phone]

## 2024-12-09 ENCOUNTER — NON-APPOINTMENT (OUTPATIENT)
Age: 69
End: 2024-12-09

## 2024-12-09 ENCOUNTER — APPOINTMENT (OUTPATIENT)
Dept: UROLOGY | Facility: CLINIC | Age: 69
End: 2024-12-09
Payer: MEDICARE

## 2024-12-09 VITALS
TEMPERATURE: 97.7 F | RESPIRATION RATE: 17 BRPM | SYSTOLIC BLOOD PRESSURE: 132 MMHG | HEART RATE: 70 BPM | WEIGHT: 225 LBS | DIASTOLIC BLOOD PRESSURE: 83 MMHG | BODY MASS INDEX: 30.48 KG/M2 | HEIGHT: 72 IN

## 2024-12-09 DIAGNOSIS — N13.8 BENIGN PROSTATIC HYPERPLASIA WITH LOWER URINARY TRACT SYMPMS: ICD-10-CM

## 2024-12-09 DIAGNOSIS — R33.9 RETENTION OF URINE, UNSPECIFIED: ICD-10-CM

## 2024-12-09 DIAGNOSIS — N52.9 MALE ERECTILE DYSFUNCTION, UNSPECIFIED: ICD-10-CM

## 2024-12-09 DIAGNOSIS — N40.1 BENIGN PROSTATIC HYPERPLASIA WITH LOWER URINARY TRACT SYMPMS: ICD-10-CM

## 2024-12-09 PROCEDURE — 99214 OFFICE O/P EST MOD 30 MIN: CPT

## 2024-12-09 RX ORDER — SILDENAFIL 100 MG/1
100 TABLET, FILM COATED ORAL
Qty: 30 | Refills: 5 | Status: ACTIVE | COMMUNITY
Start: 2024-12-09 | End: 1900-01-01

## 2025-01-21 NOTE — ED ADULT NURSE NOTE - SUICIDE SCREENING QUESTION 2
Called pharmacy, patient should have refills as below:    budesonide-formoterol (SYMBICORT) 80-4.5 MCG/ACT Inhaler 20.4 g 11 10/3/2024     Joceline Brito RN        
No

## (undated) DEVICE — Device

## (undated) DEVICE — DRAPE C ARM UNIVERSAL

## (undated) DEVICE — SOL IRR POUR NS 0.9% 500ML

## (undated) DEVICE — SUT BIOSYN 4-0 18" P-12

## (undated) DEVICE — GLV 8 PROTEGRITY

## (undated) DEVICE — TUBING RANGER FLUID IRRIGATION SET DISP

## (undated) DEVICE — FOLEY HOLDER STATLOCK 3 WAY ADULT

## (undated) DEVICE — FOLEY HOLDER STATLOCK 2 WAY ADULT

## (undated) DEVICE — PREP CHLORAPREP HI-LITE ORANGE 26ML

## (undated) DEVICE — SOL IRR BAG NS 0.9% 3000ML

## (undated) DEVICE — GOWN XXXL

## (undated) DEVICE — GOWN TRIMAX LG

## (undated) DEVICE — SAW BLADE MICROAIRE STERNUM 1X34X9.4MM

## (undated) DEVICE — GOWN LG

## (undated) DEVICE — POSITIONER FOAM EGG CRATE ULNAR 2PCS (PINK)

## (undated) DEVICE — TUBING SUCTION 20FT

## (undated) DEVICE — SYR LUER LOK 30CC

## (undated) DEVICE — SOL IRR BAG H2O 3000ML

## (undated) DEVICE — TUBING SET TUR BLADDER IRRIGATION Y-TYPE 81"

## (undated) DEVICE — BLADE SURGICAL #11 CARBON

## (undated) DEVICE — PACK BASIN SPECIAL PROCEDURE

## (undated) DEVICE — VESSEL LOOP MAXI-RED  0.120" X 16"

## (undated) DEVICE — TUBING SET FOR SUCTION PUMP

## (undated) DEVICE — POSITIONER FOAM HEADREST (PINK)

## (undated) DEVICE — SOL IRR POUR H2O 250ML

## (undated) DEVICE — SPECIMEN CONTAINER 100ML

## (undated) DEVICE — MNFLD SETUP

## (undated) DEVICE — GOWN SLEEVES

## (undated) DEVICE — GLV 7.5 PROTEXIS (WHITE)

## (undated) DEVICE — DRAPE TOWEL BLUE 17" X 24"

## (undated) DEVICE — IRRIGATION TRAY W PISTON SYRINGE 60ML

## (undated) DEVICE — GLV 8 PROTEXIS (WHITE)

## (undated) DEVICE — LAP PAD 18 X 18"

## (undated) DEVICE — DRAPE 1/2 SHEET 40X57"

## (undated) DEVICE — SUT PROLENE 5-0 30" RB-2

## (undated) DEVICE — PACK UNIVERSAL CARDIAC

## (undated) DEVICE — FOLEY CATH 3-WAY 22FR 30CC LATEX HEMATURIA COUDE

## (undated) DEVICE — CONTAINER TISSUE COLLECTING DISP

## (undated) DEVICE — GOWN TRIMAX XXL

## (undated) DEVICE — PACING CABLE TEMP MEDTRONIC WITH PAC-LOC

## (undated) DEVICE — DRAPE FEMORAL ANGIOGRAPHY W TROUGH

## (undated) DEVICE — VISITEC 4X4

## (undated) DEVICE — DRAPE U 47X51" LF STERILE

## (undated) DEVICE — TUBING THERMADX UROLOGY

## (undated) DEVICE — SUT SOFSILK 0 30" TIES

## (undated) DEVICE — SUCTION YANKAUER NO CONTROL VENT

## (undated) DEVICE — CONNECTOR STRAIGHT 3/8 X 1/2"

## (undated) DEVICE — ELCTR PLASMA BUTTON OVAL 24FR 12-30 DEG

## (undated) DEVICE — ELCTR BOVIE TIP BLADE VALLEYLAB 6.5"

## (undated) DEVICE — LABELS BLANK W PEN

## (undated) DEVICE — DRAPE 3/4 SHEET W REINFORCEMENT 56X77"

## (undated) DEVICE — ELCTR HEX BLADE

## (undated) DEVICE — WARMING BLANKET UPPER ADULT

## (undated) DEVICE — FOLEY CATH 3-WAY 22FR 30CC LATEX LUBRICATH

## (undated) DEVICE — ELCTR BOVIE PENCIL HANDPIECE ROCKER SWITCH 15FT

## (undated) DEVICE — PREP BETADINE KIT

## (undated) DEVICE — ELCTR PLASMA LOOP MEDIUM ANGLED 24FR 12-30 DEG

## (undated) DEVICE — DRAPE LINGEMAN TUR

## (undated) DEVICE — BLADE SCALPEL SAFETYLOCK #15

## (undated) DEVICE — DRAPE INSTRUMENT POUCH 6.75" X 11"

## (undated) DEVICE — VENODYNE/SCD SLEEVE CALF MEDIUM

## (undated) DEVICE — SOL NORMOSOL-R PH7.4 1000ML

## (undated) DEVICE — DRAIN RESERVOIR FOR JACKSON PRATT 100CC CARDINAL

## (undated) DEVICE — DRSG OPSITE 13.75 X 4"

## (undated) DEVICE — NDL COUNTER FOAM AND MAGNET 40-70

## (undated) DEVICE — TOURNIQUET SET 12FR (1 RED, 1 BLUE, 1 SNARE) 7"

## (undated) DEVICE — DRSG TEGADERM 6"X8"

## (undated) DEVICE — NSM-GU WOLF PIRANHA MORCELLATOR: Type: DURABLE MEDICAL EQUIPMENT

## (undated) DEVICE — SUT PROLENE 4-0 36" BB

## (undated) DEVICE — GLV 7 PROTEXIS (WHITE)

## (undated) DEVICE — SOL IRR POUR H2O 1500ML

## (undated) DEVICE — SUT POLYSORB 2-0 30" GS-21 UNDYED

## (undated) DEVICE — DRAPE IOBAN 23" X 23"

## (undated) DEVICE — BLADE SCALPEL SAFETYLOCK #10

## (undated) DEVICE — CONNECTOR STRAIGHT 3/8 X 3/8" W LUER LOCK

## (undated) DEVICE — MEDELA OVERFLOW FILTER DISPOSABLE

## (undated) DEVICE — SUT PROLENE 5-0 36" RB-1

## (undated) DEVICE — SUT SOFSILK 0 30" V-20

## (undated) DEVICE — WARMING BLANKET DUO-THERM HYPER/HYPOTHERM ADULT

## (undated) DEVICE — ELCTR REM POLYHESIVE ADULT PT RETURN 15FT

## (undated) DEVICE — FOLEY TRAY 16FR 5CC LF LUBRISIL ADVANCE TEMP CLOSED

## (undated) DEVICE — SYR ASEPTO

## (undated) DEVICE — PACK CARDIAC YELLOW

## (undated) DEVICE — CANISTER DISPOSABLE THIN WALL 3000CC

## (undated) DEVICE — MEDICATION LABELS W MARKER

## (undated) DEVICE — BAG URINE W METER 2L

## (undated) DEVICE — SUT TICRON 5 30" KV-40

## (undated) DEVICE — PACK CYSTO

## (undated) DEVICE — DRSG OPSITE 2.5 X 2"

## (undated) DEVICE — BLADE SCALPEL SAFETYLOCK #11

## (undated) DEVICE — SUT BIOSYN 3-0 18" P-12

## (undated) DEVICE — SAW BLADE MICROAIRE STERNUM 1.1X50X42MM

## (undated) DEVICE — GLV 8.5 PROTEXIS (WHITE)

## (undated) DEVICE — DRAIN CHANNEL 32FR ROUND HUBLESS FULL FLUTED

## (undated) DEVICE — GLV 8 RADIATION

## (undated) DEVICE — WARMING BLANKET FULL UNDERBODY

## (undated) DEVICE — ELCTR PLASMA ROLLER 24FR 12-30 DEG

## (undated) DEVICE — BLADDER EVACUATOR ELLIK DISP STERILE